# Patient Record
Sex: FEMALE | Race: BLACK OR AFRICAN AMERICAN | NOT HISPANIC OR LATINO | Employment: OTHER | URBAN - METROPOLITAN AREA
[De-identification: names, ages, dates, MRNs, and addresses within clinical notes are randomized per-mention and may not be internally consistent; named-entity substitution may affect disease eponyms.]

---

## 2021-07-28 ENCOUNTER — OFFICE VISIT (OUTPATIENT)
Dept: OPTOMETRY | Facility: CLINIC | Age: 81
End: 2021-07-28
Payer: MEDICARE

## 2021-07-28 DIAGNOSIS — E11.9 DIABETIC EYE EXAM: Primary | ICD-10-CM

## 2021-07-28 DIAGNOSIS — Z96.1 PSEUDOPHAKIA: ICD-10-CM

## 2021-07-28 DIAGNOSIS — H52.7 REFRACTIVE ERROR: ICD-10-CM

## 2021-07-28 DIAGNOSIS — Z01.00 DIABETIC EYE EXAM: Primary | ICD-10-CM

## 2021-07-28 PROCEDURE — 1101F PT FALLS ASSESS-DOCD LE1/YR: CPT | Mod: CPTII,S$GLB,, | Performed by: OPTOMETRIST

## 2021-07-28 PROCEDURE — 3288F PR FALLS RISK ASSESSMENT DOCUMENTED: ICD-10-PCS | Mod: CPTII,S$GLB,, | Performed by: OPTOMETRIST

## 2021-07-28 PROCEDURE — 1160F PR REVIEW ALL MEDS BY PRESCRIBER/CLIN PHARMACIST DOCUMENTED: ICD-10-PCS | Mod: CPTII,S$GLB,, | Performed by: OPTOMETRIST

## 2021-07-28 PROCEDURE — 3288F FALL RISK ASSESSMENT DOCD: CPT | Mod: CPTII,S$GLB,, | Performed by: OPTOMETRIST

## 2021-07-28 PROCEDURE — 1159F PR MEDICATION LIST DOCUMENTED IN MEDICAL RECORD: ICD-10-PCS | Mod: CPTII,S$GLB,, | Performed by: OPTOMETRIST

## 2021-07-28 PROCEDURE — 1126F PR PAIN SEVERITY QUANTIFIED, NO PAIN PRESENT: ICD-10-PCS | Mod: CPTII,S$GLB,, | Performed by: OPTOMETRIST

## 2021-07-28 PROCEDURE — 1160F RVW MEDS BY RX/DR IN RCRD: CPT | Mod: CPTII,S$GLB,, | Performed by: OPTOMETRIST

## 2021-07-28 PROCEDURE — 92004 COMPRE OPH EXAM NEW PT 1/>: CPT | Mod: S$GLB,,, | Performed by: OPTOMETRIST

## 2021-07-28 PROCEDURE — 1126F AMNT PAIN NOTED NONE PRSNT: CPT | Mod: CPTII,S$GLB,, | Performed by: OPTOMETRIST

## 2021-07-28 PROCEDURE — 92015 DETERMINE REFRACTIVE STATE: CPT | Mod: S$GLB,,, | Performed by: OPTOMETRIST

## 2021-07-28 PROCEDURE — 1159F MED LIST DOCD IN RCRD: CPT | Mod: CPTII,S$GLB,, | Performed by: OPTOMETRIST

## 2021-07-28 PROCEDURE — 99999 PR PBB SHADOW E&M-NEW PATIENT-LVL III: ICD-10-PCS | Mod: PBBFAC,,, | Performed by: OPTOMETRIST

## 2021-07-28 PROCEDURE — 99999 PR PBB SHADOW E&M-NEW PATIENT-LVL III: CPT | Mod: PBBFAC,,, | Performed by: OPTOMETRIST

## 2021-07-28 PROCEDURE — 1101F PR PT FALLS ASSESS DOC 0-1 FALLS W/OUT INJ PAST YR: ICD-10-PCS | Mod: CPTII,S$GLB,, | Performed by: OPTOMETRIST

## 2021-07-28 PROCEDURE — 92004 PR EYE EXAM, NEW PATIENT,COMPREHESV: ICD-10-PCS | Mod: S$GLB,,, | Performed by: OPTOMETRIST

## 2021-07-28 PROCEDURE — 92015 PR REFRACTION: ICD-10-PCS | Mod: S$GLB,,, | Performed by: OPTOMETRIST

## 2021-07-28 RX ORDER — LEVOTHYROXINE SODIUM 150 UG/1
TABLET ORAL
COMMUNITY
Start: 2020-10-08

## 2021-07-28 RX ORDER — LEVOCETIRIZINE DIHYDROCHLORIDE 5 MG/1
1 TABLET, FILM COATED ORAL NIGHTLY
Status: ON HOLD | COMMUNITY
Start: 2021-03-12 | End: 2022-04-15 | Stop reason: SINTOL

## 2021-07-28 RX ORDER — MONTELUKAST SODIUM 10 MG/1
1 TABLET ORAL NIGHTLY
COMMUNITY
Start: 2020-10-08

## 2021-07-28 RX ORDER — PROPRANOLOL HYDROCHLORIDE 20 MG/1
1 TABLET ORAL 2 TIMES DAILY
Status: ON HOLD | COMMUNITY
Start: 2021-05-31 | End: 2022-04-14 | Stop reason: CLARIF

## 2021-07-28 RX ORDER — CLONIDINE HYDROCHLORIDE 0.2 MG/1
1 TABLET ORAL 2 TIMES DAILY
Status: ON HOLD | COMMUNITY
Start: 2021-03-12 | End: 2022-04-14 | Stop reason: CLARIF

## 2021-07-28 RX ORDER — GLIMEPIRIDE 4 MG/1
1 TABLET ORAL EVERY MORNING
COMMUNITY
Start: 2021-05-31 | End: 2022-05-31

## 2021-07-28 RX ORDER — ISOSORBIDE MONONITRATE 20 MG/1
1 TABLET ORAL 2 TIMES DAILY
COMMUNITY
Start: 2021-02-03

## 2021-07-28 RX ORDER — SIMVASTATIN 10 MG/1
1 TABLET, FILM COATED ORAL NIGHTLY
Status: ON HOLD | COMMUNITY
Start: 2021-01-25 | End: 2022-04-14 | Stop reason: CLARIF

## 2021-07-28 RX ORDER — AMLODIPINE BESYLATE 10 MG/1
1 TABLET ORAL DAILY
Status: ON HOLD | COMMUNITY
Start: 2020-11-05 | End: 2022-04-14 | Stop reason: CLARIF

## 2021-07-28 RX ORDER — FUROSEMIDE 20 MG/1
1 TABLET ORAL DAILY
Status: ON HOLD | COMMUNITY
Start: 2021-01-19 | End: 2022-04-14 | Stop reason: CLARIF

## 2021-07-28 RX ORDER — GABAPENTIN 100 MG/1
1 CAPSULE ORAL
Status: ON HOLD | COMMUNITY
Start: 2020-10-08 | End: 2022-04-14 | Stop reason: CLARIF

## 2021-07-28 RX ORDER — LEVOFLOXACIN 750 MG/1
TABLET ORAL
Status: ON HOLD | COMMUNITY
Start: 2021-05-13 | End: 2022-04-14 | Stop reason: CLARIF

## 2021-07-28 RX ORDER — ALBUTEROL SULFATE 90 UG/1
AEROSOL, METERED RESPIRATORY (INHALATION)
COMMUNITY
Start: 2021-02-03

## 2021-07-28 RX ORDER — HYDRALAZINE HYDROCHLORIDE 100 MG/1
1 TABLET, FILM COATED ORAL 2 TIMES DAILY
COMMUNITY
Start: 2020-10-08 | End: 2022-04-14

## 2021-07-28 RX ORDER — CALCITRIOL 0.25 UG/1
1 CAPSULE ORAL DAILY
COMMUNITY
Start: 2021-01-12

## 2022-04-11 ENCOUNTER — TELEPHONE (OUTPATIENT)
Dept: OPHTHALMOLOGY | Facility: CLINIC | Age: 82
End: 2022-04-11
Payer: MEDICARE

## 2022-04-11 NOTE — TELEPHONE ENCOUNTER
----- Message from Denise Maldonado sent at 4/11/2022  9:32 AM CDT -----  Regarding: call back  Contact: pt daughter  Pt daughter requesting a call back in regards to scheduling an appt for pt having severe pain in eyes. Pt daughter stated she need a Tuesday or Thursday appt due to pt going to dialysis on Monday, Wednesday, and Friday.    Della @ [phone]

## 2022-04-12 PROCEDURE — G0180 PR HOME HEALTH MD CERTIFICATION: ICD-10-PCS | Mod: ,,, | Performed by: STUDENT IN AN ORGANIZED HEALTH CARE EDUCATION/TRAINING PROGRAM

## 2022-04-12 PROCEDURE — G0180 MD CERTIFICATION HHA PATIENT: HCPCS | Mod: ,,, | Performed by: STUDENT IN AN ORGANIZED HEALTH CARE EDUCATION/TRAINING PROGRAM

## 2022-04-14 ENCOUNTER — HOSPITAL ENCOUNTER (INPATIENT)
Facility: HOSPITAL | Age: 82
LOS: 8 days | Discharge: HOME HEALTH | DRG: 113 | End: 2022-04-22
Attending: EMERGENCY MEDICINE | Admitting: HOSPITALIST
Payer: MEDICARE

## 2022-04-14 ENCOUNTER — OFFICE VISIT (OUTPATIENT)
Dept: OPTOMETRY | Facility: CLINIC | Age: 82
End: 2022-04-14
Payer: MEDICARE

## 2022-04-14 ENCOUNTER — OFFICE VISIT (OUTPATIENT)
Dept: OPHTHALMOLOGY | Facility: CLINIC | Age: 82
End: 2022-04-14
Payer: MEDICARE

## 2022-04-14 DIAGNOSIS — H57.12 LEFT EYE PAIN: ICD-10-CM

## 2022-04-14 DIAGNOSIS — H44.009 ENDOPHTHALMITIS: Primary | ICD-10-CM

## 2022-04-14 DIAGNOSIS — H05.012 ORBITAL CELLULITIS, LEFT: ICD-10-CM

## 2022-04-14 DIAGNOSIS — E11.65 TYPE 2 DIABETES MELLITUS WITH HYPERGLYCEMIA, WITHOUT LONG-TERM CURRENT USE OF INSULIN: ICD-10-CM

## 2022-04-14 DIAGNOSIS — H20.052 HYPOPYON OF LEFT EYE: ICD-10-CM

## 2022-04-14 DIAGNOSIS — H44.002 ENDOPHTHALMITIS, LEFT EYE: Primary | ICD-10-CM

## 2022-04-14 DIAGNOSIS — Z96.1 PSEUDOPHAKIA OF BOTH EYES: ICD-10-CM

## 2022-04-14 DIAGNOSIS — H43.813 VITREOUS DEGENERATION OF BOTH EYES: ICD-10-CM

## 2022-04-14 DIAGNOSIS — N18.6 TYPE 2 DIABETES MELLITUS WITH CHRONIC KIDNEY DISEASE ON CHRONIC DIALYSIS, WITHOUT LONG-TERM CURRENT USE OF INSULIN: ICD-10-CM

## 2022-04-14 DIAGNOSIS — E11.22 TYPE 2 DIABETES MELLITUS WITH CHRONIC KIDNEY DISEASE ON CHRONIC DIALYSIS, WITHOUT LONG-TERM CURRENT USE OF INSULIN: ICD-10-CM

## 2022-04-14 DIAGNOSIS — N18.6 ESRD (END STAGE RENAL DISEASE): ICD-10-CM

## 2022-04-14 DIAGNOSIS — Z99.2 TYPE 2 DIABETES MELLITUS WITH CHRONIC KIDNEY DISEASE ON CHRONIC DIALYSIS, WITHOUT LONG-TERM CURRENT USE OF INSULIN: ICD-10-CM

## 2022-04-14 DIAGNOSIS — R78.81 BACTEREMIA: ICD-10-CM

## 2022-04-14 DIAGNOSIS — M86.271 SUBACUTE OSTEOMYELITIS OF RIGHT FOOT: ICD-10-CM

## 2022-04-14 PROBLEM — B96.1 BACTEREMIA DUE TO KLEBSIELLA PNEUMONIAE: Status: ACTIVE | Noted: 2022-04-14

## 2022-04-14 PROBLEM — E11.9 DM2 (DIABETES MELLITUS, TYPE 2): Status: ACTIVE | Noted: 2022-04-14

## 2022-04-14 PROBLEM — I10 HTN (HYPERTENSION): Status: ACTIVE | Noted: 2022-04-14

## 2022-04-14 PROBLEM — I25.10 CAD (CORONARY ARTERY DISEASE): Status: ACTIVE | Noted: 2022-04-14

## 2022-04-14 LAB
ANION GAP SERPL CALC-SCNC: 13 MMOL/L (ref 8–16)
BASOPHILS # BLD AUTO: 0.01 K/UL (ref 0–0.2)
BASOPHILS NFR BLD: 0.1 % (ref 0–1.9)
BUN SERPL-MCNC: 33 MG/DL (ref 8–23)
CALCIUM SERPL-MCNC: 9.1 MG/DL (ref 8.7–10.5)
CHLORIDE SERPL-SCNC: 96 MMOL/L (ref 95–110)
CO2 SERPL-SCNC: 28 MMOL/L (ref 23–29)
CREAT SERPL-MCNC: 4.5 MG/DL (ref 0.5–1.4)
DIFFERENTIAL METHOD: ABNORMAL
EOSINOPHIL # BLD AUTO: 0.1 K/UL (ref 0–0.5)
EOSINOPHIL NFR BLD: 0.4 % (ref 0–8)
ERYTHROCYTE [DISTWIDTH] IN BLOOD BY AUTOMATED COUNT: 15 % (ref 11.5–14.5)
EST. GFR  (AFRICAN AMERICAN): 9.8 ML/MIN/1.73 M^2
EST. GFR  (NON AFRICAN AMERICAN): 8.5 ML/MIN/1.73 M^2
GLUCOSE SERPL-MCNC: 71 MG/DL (ref 70–110)
HCT VFR BLD AUTO: 25.5 % (ref 37–48.5)
HGB BLD-MCNC: 7.7 G/DL (ref 12–16)
IMM GRANULOCYTES # BLD AUTO: 0.1 K/UL (ref 0–0.04)
IMM GRANULOCYTES NFR BLD AUTO: 0.7 % (ref 0–0.5)
LACTATE SERPL-SCNC: 0.9 MMOL/L (ref 0.5–2.2)
LYMPHOCYTES # BLD AUTO: 0.9 K/UL (ref 1–4.8)
LYMPHOCYTES NFR BLD: 6.8 % (ref 18–48)
MCH RBC QN AUTO: 28.7 PG (ref 27–31)
MCHC RBC AUTO-ENTMCNC: 30.2 G/DL (ref 32–36)
MCV RBC AUTO: 95 FL (ref 82–98)
MONOCYTES # BLD AUTO: 1 K/UL (ref 0.3–1)
MONOCYTES NFR BLD: 7.4 % (ref 4–15)
NEUTROPHILS # BLD AUTO: 11.7 K/UL (ref 1.8–7.7)
NEUTROPHILS NFR BLD: 84.6 % (ref 38–73)
NRBC BLD-RTO: 0 /100 WBC
PLATELET # BLD AUTO: 377 K/UL (ref 150–450)
PMV BLD AUTO: 9 FL (ref 9.2–12.9)
POCT GLUCOSE: 114 MG/DL (ref 70–110)
POCT GLUCOSE: 120 MG/DL (ref 70–110)
POCT GLUCOSE: 70 MG/DL (ref 70–110)
POTASSIUM SERPL-SCNC: 4.1 MMOL/L (ref 3.5–5.1)
RBC # BLD AUTO: 2.68 M/UL (ref 4–5.4)
SODIUM SERPL-SCNC: 137 MMOL/L (ref 136–145)
WBC # BLD AUTO: 13.85 K/UL (ref 3.9–12.7)

## 2022-04-14 PROCEDURE — 99999 PR PBB SHADOW E&M-EST. PATIENT-LVL III: CPT | Mod: PBBFAC,,, | Performed by: OPHTHALMOLOGY

## 2022-04-14 PROCEDURE — 87040 BLOOD CULTURE FOR BACTERIA: CPT | Mod: 59 | Performed by: EMERGENCY MEDICINE

## 2022-04-14 PROCEDURE — 1101F PT FALLS ASSESS-DOCD LE1/YR: CPT | Mod: CPTII,S$GLB,, | Performed by: OPHTHALMOLOGY

## 2022-04-14 PROCEDURE — 1125F PR PAIN SEVERITY QUANTIFIED, PAIN PRESENT: ICD-10-PCS | Mod: CPTII,S$GLB,, | Performed by: OPHTHALMOLOGY

## 2022-04-14 PROCEDURE — 99999 PR PBB SHADOW E&M-EST. PATIENT-LVL III: ICD-10-PCS | Mod: PBBFAC,,, | Performed by: OPTOMETRIST

## 2022-04-14 PROCEDURE — 3288F PR FALLS RISK ASSESSMENT DOCUMENTED: ICD-10-PCS | Mod: CPTII,S$GLB,, | Performed by: OPTOMETRIST

## 2022-04-14 PROCEDURE — 82962 GLUCOSE BLOOD TEST: CPT

## 2022-04-14 PROCEDURE — 1160F RVW MEDS BY RX/DR IN RCRD: CPT | Mod: CPTII,S$GLB,, | Performed by: OPHTHALMOLOGY

## 2022-04-14 PROCEDURE — 1101F PR PT FALLS ASSESS DOC 0-1 FALLS W/OUT INJ PAST YR: ICD-10-PCS | Mod: CPTII,S$GLB,, | Performed by: OPTOMETRIST

## 2022-04-14 PROCEDURE — 3288F PR FALLS RISK ASSESSMENT DOCUMENTED: ICD-10-PCS | Mod: CPTII,S$GLB,, | Performed by: OPHTHALMOLOGY

## 2022-04-14 PROCEDURE — 12000002 HC ACUTE/MED SURGE SEMI-PRIVATE ROOM

## 2022-04-14 PROCEDURE — 99204 OFFICE O/P NEW MOD 45 MIN: CPT | Mod: S$GLB,,, | Performed by: OPHTHALMOLOGY

## 2022-04-14 PROCEDURE — 1101F PR PT FALLS ASSESS DOC 0-1 FALLS W/OUT INJ PAST YR: ICD-10-PCS | Mod: CPTII,S$GLB,, | Performed by: OPHTHALMOLOGY

## 2022-04-14 PROCEDURE — 1125F AMNT PAIN NOTED PAIN PRSNT: CPT | Mod: CPTII,S$GLB,, | Performed by: OPHTHALMOLOGY

## 2022-04-14 PROCEDURE — 99285 EMERGENCY DEPT VISIT HI MDM: CPT | Mod: 25

## 2022-04-14 PROCEDURE — 99999 PR PBB SHADOW E&M-EST. PATIENT-LVL III: ICD-10-PCS | Mod: PBBFAC,,, | Performed by: OPHTHALMOLOGY

## 2022-04-14 PROCEDURE — 85025 COMPLETE CBC W/AUTO DIFF WBC: CPT | Performed by: STUDENT IN AN ORGANIZED HEALTH CARE EDUCATION/TRAINING PROGRAM

## 2022-04-14 PROCEDURE — 92014 COMPRE OPH EXAM EST PT 1/>: CPT | Mod: S$GLB,,, | Performed by: OPTOMETRIST

## 2022-04-14 PROCEDURE — 3288F FALL RISK ASSESSMENT DOCD: CPT | Mod: CPTII,S$GLB,, | Performed by: OPTOMETRIST

## 2022-04-14 PROCEDURE — 76512 OPH US DX B-SCAN: CPT | Mod: 50,S$GLB,, | Performed by: OPHTHALMOLOGY

## 2022-04-14 PROCEDURE — 99204 PR OFFICE/OUTPT VISIT, NEW, LEVL IV, 45-59 MIN: ICD-10-PCS | Mod: S$GLB,,, | Performed by: OPHTHALMOLOGY

## 2022-04-14 PROCEDURE — 80048 BASIC METABOLIC PNL TOTAL CA: CPT | Performed by: STUDENT IN AN ORGANIZED HEALTH CARE EDUCATION/TRAINING PROGRAM

## 2022-04-14 PROCEDURE — 1159F MED LIST DOCD IN RCRD: CPT | Mod: CPTII,S$GLB,, | Performed by: OPHTHALMOLOGY

## 2022-04-14 PROCEDURE — 92014 PR EYE EXAM, EST PATIENT,COMPREHESV: ICD-10-PCS | Mod: S$GLB,,, | Performed by: OPTOMETRIST

## 2022-04-14 PROCEDURE — 83036 HEMOGLOBIN GLYCOSYLATED A1C: CPT | Performed by: STUDENT IN AN ORGANIZED HEALTH CARE EDUCATION/TRAINING PROGRAM

## 2022-04-14 PROCEDURE — 1157F ADVNC CARE PLAN IN RCRD: CPT | Mod: CPTII,S$GLB,, | Performed by: OPTOMETRIST

## 2022-04-14 PROCEDURE — 1160F PR REVIEW ALL MEDS BY PRESCRIBER/CLIN PHARMACIST DOCUMENTED: ICD-10-PCS | Mod: CPTII,S$GLB,, | Performed by: OPHTHALMOLOGY

## 2022-04-14 PROCEDURE — 63600175 PHARM REV CODE 636 W HCPCS: Performed by: STUDENT IN AN ORGANIZED HEALTH CARE EDUCATION/TRAINING PROGRAM

## 2022-04-14 PROCEDURE — 99284 EMERGENCY DEPT VISIT MOD MDM: CPT | Mod: ,,, | Performed by: EMERGENCY MEDICINE

## 2022-04-14 PROCEDURE — 1157F PR ADVANCE CARE PLAN OR EQUIV PRESENT IN MEDICAL RECORD: ICD-10-PCS | Mod: CPTII,S$GLB,, | Performed by: OPTOMETRIST

## 2022-04-14 PROCEDURE — 83605 ASSAY OF LACTIC ACID: CPT | Performed by: STUDENT IN AN ORGANIZED HEALTH CARE EDUCATION/TRAINING PROGRAM

## 2022-04-14 PROCEDURE — 76512 B SCAN: ICD-10-PCS | Mod: 50,S$GLB,, | Performed by: OPHTHALMOLOGY

## 2022-04-14 PROCEDURE — 1159F PR MEDICATION LIST DOCUMENTED IN MEDICAL RECORD: ICD-10-PCS | Mod: CPTII,S$GLB,, | Performed by: OPHTHALMOLOGY

## 2022-04-14 PROCEDURE — 3288F FALL RISK ASSESSMENT DOCD: CPT | Mod: CPTII,S$GLB,, | Performed by: OPHTHALMOLOGY

## 2022-04-14 PROCEDURE — 99284 PR EMERGENCY DEPT VISIT,LEVEL IV: ICD-10-PCS | Mod: ,,, | Performed by: EMERGENCY MEDICINE

## 2022-04-14 PROCEDURE — 1101F PT FALLS ASSESS-DOCD LE1/YR: CPT | Mod: CPTII,S$GLB,, | Performed by: OPTOMETRIST

## 2022-04-14 PROCEDURE — 99999 PR PBB SHADOW E&M-EST. PATIENT-LVL III: CPT | Mod: PBBFAC,,, | Performed by: OPTOMETRIST

## 2022-04-14 PROCEDURE — 25500020 PHARM REV CODE 255: Performed by: EMERGENCY MEDICINE

## 2022-04-14 PROCEDURE — 25000003 PHARM REV CODE 250: Performed by: HOSPITALIST

## 2022-04-14 PROCEDURE — 1157F ADVNC CARE PLAN IN RCRD: CPT | Mod: CPTII,S$GLB,, | Performed by: OPHTHALMOLOGY

## 2022-04-14 PROCEDURE — 1157F PR ADVANCE CARE PLAN OR EQUIV PRESENT IN MEDICAL RECORD: ICD-10-PCS | Mod: CPTII,S$GLB,, | Performed by: OPHTHALMOLOGY

## 2022-04-14 PROCEDURE — 63600175 PHARM REV CODE 636 W HCPCS: Performed by: HOSPITALIST

## 2022-04-14 RX ORDER — CARVEDILOL 3.12 MG/1
3.12 TABLET ORAL 2 TIMES DAILY
Status: DISCONTINUED | OUTPATIENT
Start: 2022-04-14 | End: 2022-04-15

## 2022-04-14 RX ORDER — AMLODIPINE BESYLATE 10 MG/1
10 TABLET ORAL DAILY
Status: DISCONTINUED | OUTPATIENT
Start: 2022-04-15 | End: 2022-04-14

## 2022-04-14 RX ORDER — HYDRALAZINE HYDROCHLORIDE 25 MG/1
100 TABLET, FILM COATED ORAL 2 TIMES DAILY
Status: DISCONTINUED | OUTPATIENT
Start: 2022-04-14 | End: 2022-04-14

## 2022-04-14 RX ORDER — HEPARIN SODIUM 5000 [USP'U]/ML
5000 INJECTION, SOLUTION INTRAVENOUS; SUBCUTANEOUS EVERY 8 HOURS
Status: DISCONTINUED | OUTPATIENT
Start: 2022-04-14 | End: 2022-04-16

## 2022-04-14 RX ORDER — IBUPROFEN 200 MG
24 TABLET ORAL
Status: DISCONTINUED | OUTPATIENT
Start: 2022-04-14 | End: 2022-04-14

## 2022-04-14 RX ORDER — CALCITRIOL 0.25 UG/1
0.25 CAPSULE ORAL DAILY
Status: DISCONTINUED | OUTPATIENT
Start: 2022-04-15 | End: 2022-04-14

## 2022-04-14 RX ORDER — CIPROFLOXACIN HYDROCHLORIDE 250 MG/1
250 TABLET, FILM COATED ORAL DAILY
Status: ON HOLD | COMMUNITY
Start: 2022-04-08 | End: 2022-04-22 | Stop reason: HOSPADM

## 2022-04-14 RX ORDER — GABAPENTIN 100 MG/1
100 CAPSULE ORAL DAILY
Status: DISCONTINUED | OUTPATIENT
Start: 2022-04-15 | End: 2022-04-14

## 2022-04-14 RX ORDER — IBUPROFEN 200 MG
16 TABLET ORAL
Status: DISCONTINUED | OUTPATIENT
Start: 2022-04-14 | End: 2022-04-15

## 2022-04-14 RX ORDER — TALC
6 POWDER (GRAM) TOPICAL NIGHTLY PRN
Status: DISCONTINUED | OUTPATIENT
Start: 2022-04-14 | End: 2022-04-22 | Stop reason: HOSPADM

## 2022-04-14 RX ORDER — INSULIN ASPART 100 [IU]/ML
0-5 INJECTION, SOLUTION INTRAVENOUS; SUBCUTANEOUS
Status: DISCONTINUED | OUTPATIENT
Start: 2022-04-14 | End: 2022-04-22 | Stop reason: HOSPADM

## 2022-04-14 RX ORDER — IPRATROPIUM BROMIDE AND ALBUTEROL SULFATE 2.5; .5 MG/3ML; MG/3ML
3 SOLUTION RESPIRATORY (INHALATION) EVERY 6 HOURS PRN
Status: DISCONTINUED | OUTPATIENT
Start: 2022-04-14 | End: 2022-04-22 | Stop reason: HOSPADM

## 2022-04-14 RX ORDER — FUROSEMIDE 20 MG/1
20 TABLET ORAL DAILY
Status: DISCONTINUED | OUTPATIENT
Start: 2022-04-15 | End: 2022-04-14

## 2022-04-14 RX ORDER — ONDANSETRON 2 MG/ML
4 INJECTION INTRAMUSCULAR; INTRAVENOUS EVERY 8 HOURS PRN
Status: DISCONTINUED | OUTPATIENT
Start: 2022-04-14 | End: 2022-04-22 | Stop reason: HOSPADM

## 2022-04-14 RX ORDER — HYDRALAZINE HYDROCHLORIDE 25 MG/1
25 TABLET, FILM COATED ORAL 3 TIMES DAILY
Status: DISCONTINUED | OUTPATIENT
Start: 2022-04-14 | End: 2022-04-15

## 2022-04-14 RX ORDER — IBUPROFEN 200 MG
16 TABLET ORAL
Status: DISCONTINUED | OUTPATIENT
Start: 2022-04-14 | End: 2022-04-14

## 2022-04-14 RX ORDER — PROPRANOLOL HYDROCHLORIDE 10 MG/1
20 TABLET ORAL 2 TIMES DAILY
Status: DISCONTINUED | OUTPATIENT
Start: 2022-04-14 | End: 2022-04-14

## 2022-04-14 RX ORDER — GLUCAGON 1 MG
1 KIT INJECTION
Status: DISCONTINUED | OUTPATIENT
Start: 2022-04-14 | End: 2022-04-15

## 2022-04-14 RX ORDER — IBUPROFEN 200 MG
24 TABLET ORAL
Status: DISCONTINUED | OUTPATIENT
Start: 2022-04-14 | End: 2022-04-15

## 2022-04-14 RX ORDER — ACETAMINOPHEN 325 MG/1
650 TABLET ORAL EVERY 4 HOURS PRN
Status: DISCONTINUED | OUTPATIENT
Start: 2022-04-14 | End: 2022-04-21

## 2022-04-14 RX ORDER — NALOXONE HCL 0.4 MG/ML
0.02 VIAL (ML) INJECTION
Status: DISCONTINUED | OUTPATIENT
Start: 2022-04-14 | End: 2022-04-22 | Stop reason: HOSPADM

## 2022-04-14 RX ORDER — LEVOTHYROXINE SODIUM 150 UG/1
150 TABLET ORAL DAILY
Status: DISCONTINUED | OUTPATIENT
Start: 2022-04-15 | End: 2022-04-17

## 2022-04-14 RX ORDER — ISOSORBIDE MONONITRATE 20 MG/1
20 TABLET ORAL 2 TIMES DAILY
Status: DISCONTINUED | OUTPATIENT
Start: 2022-04-14 | End: 2022-04-22 | Stop reason: HOSPADM

## 2022-04-14 RX ORDER — CLONIDINE HYDROCHLORIDE 0.1 MG/1
0.2 TABLET ORAL 2 TIMES DAILY
Status: DISCONTINUED | OUTPATIENT
Start: 2022-04-14 | End: 2022-04-14

## 2022-04-14 RX ORDER — PROCHLORPERAZINE EDISYLATE 5 MG/ML
5 INJECTION INTRAMUSCULAR; INTRAVENOUS EVERY 6 HOURS PRN
Status: DISCONTINUED | OUTPATIENT
Start: 2022-04-14 | End: 2022-04-22 | Stop reason: HOSPADM

## 2022-04-14 RX ORDER — SODIUM CHLORIDE 0.9 % (FLUSH) 0.9 %
10 SYRINGE (ML) INJECTION
Status: DISCONTINUED | OUTPATIENT
Start: 2022-04-14 | End: 2022-04-22 | Stop reason: HOSPADM

## 2022-04-14 RX ORDER — ATORVASTATIN CALCIUM 10 MG/1
10 TABLET, FILM COATED ORAL DAILY
Status: DISCONTINUED | OUTPATIENT
Start: 2022-04-15 | End: 2022-04-14

## 2022-04-14 RX ADMIN — IOHEXOL 75 ML: 350 INJECTION, SOLUTION INTRAVENOUS at 06:04

## 2022-04-14 RX ADMIN — CEFTRIAXONE 2 G: 2 INJECTION, SOLUTION INTRAVENOUS at 06:04

## 2022-04-14 RX ADMIN — HYDRALAZINE HYDROCHLORIDE 25 MG: 25 TABLET, FILM COATED ORAL at 09:04

## 2022-04-14 RX ADMIN — VANCOMYCIN HYDROCHLORIDE 2000 MG: 500 INJECTION, POWDER, LYOPHILIZED, FOR SOLUTION INTRAVENOUS at 06:04

## 2022-04-14 RX ADMIN — HEPARIN SODIUM 5000 UNITS: 5000 INJECTION INTRAVENOUS; SUBCUTANEOUS at 09:04

## 2022-04-14 RX ADMIN — CARVEDILOL 3.12 MG: 3.12 TABLET, FILM COATED ORAL at 10:04

## 2022-04-14 NOTE — ED NOTES
Erika Perera, an 82 y.o. female presents to the ED complaining of left eye swelling and pain. Went to  and was admitted on 4/4. Was seen today on the 10th floor and was sent to the ER for further treatment. Endorses pain to the left eye.       Chief Complaint   Patient presents with    Eye Problem     Left eye drooping eyelid x 2 weeks     Review of patient's allergies indicates:   Allergen Reactions    Amlodipine Other (See Comments)    Atorvastatin Other (See Comments)    Nebivolol Other (See Comments)     No past medical history on file.

## 2022-04-14 NOTE — ED NOTES
"Patient being fed by son. Family explained to hold off until OK with team and ED MDs. Pt son states, "my daughter is a nurse and I cannot sit here and not feed my own mother"  "

## 2022-04-14 NOTE — ED PROVIDER NOTES
Encounter Date: 4/14/2022       History     Chief Complaint   Patient presents with    Eye Problem     Left eye drooping eyelid x 2 weeks     Erika Perera is a 82 year old woman presenting from ophthalmology clinic for evaluation of 2 weeks of progressive L perioccular swelling, L perioccular pain, and worsening vision of the L eye. She has a history of T2DM, ESRD(MWF), HTN, HLD. She has associated chills and night sweats that began during this time as well. She denies fevers, nausea, vomiting, constipation, diarrhea, joint pain, and dysuria. Her last dialysis was yesterday.     She was evaluated in ophthalmology clinic today for assessment of this current issue with Dr. Vazquez with recommendations to come to the ED for CT Head and cultures to assess for infection. He notes that patient will likely need either evisceration or enucleation soon.        Review of patient's allergies indicates:   Allergen Reactions    Amlodipine Other (See Comments)    Atorvastatin Other (See Comments)    Nebivolol Other (See Comments)     No past medical history on file.  Past Surgical History:   Procedure Laterality Date    CATARACT EXTRACTION Bilateral      Family History   Problem Relation Age of Onset    No Known Problems Mother     No Known Problems Father     No Known Problems Sister     No Known Problems Brother     No Known Problems Maternal Aunt     No Known Problems Maternal Uncle     No Known Problems Paternal Aunt     No Known Problems Paternal Uncle     No Known Problems Maternal Grandmother     No Known Problems Maternal Grandfather     No Known Problems Paternal Grandmother     No Known Problems Paternal Grandfather     Amblyopia Neg Hx     Blindness Neg Hx     Cancer Neg Hx     Cataracts Neg Hx     Diabetes Neg Hx     Glaucoma Neg Hx     Hypertension Neg Hx     Macular degeneration Neg Hx     Retinal detachment Neg Hx     Strabismus Neg Hx     Stroke Neg Hx     Thyroid disease Neg Hx       Social History     Tobacco Use    Smoking status: Never Smoker    Smokeless tobacco: Never Used     Review of Systems   Constitutional: Positive for chills. Negative for activity change, appetite change and fever.   HENT: Negative for congestion, postnasal drip and rhinorrhea.    Eyes: Positive for photophobia, pain, discharge, redness and visual disturbance.   Respiratory: Negative for cough, shortness of breath and wheezing.    Cardiovascular: Negative for chest pain and palpitations.   Gastrointestinal: Negative for abdominal distention, abdominal pain, constipation, diarrhea, nausea and vomiting.   Genitourinary: Negative for flank pain and hematuria.   Musculoskeletal: Negative for back pain and myalgias.   Neurological: Positive for headaches. Negative for dizziness.   Psychiatric/Behavioral: Negative for agitation, behavioral problems and confusion.       Physical Exam     Initial Vitals [04/14/22 1454]   BP Pulse Resp Temp SpO2   (!) 153/64 63 16 98.8 °F (37.1 °C) 98 %      MAP       --         Physical Exam    Vitals reviewed.  Constitutional: She appears well-developed and well-nourished.   HENT:   Head: Normocephalic and atraumatic.   Eyes: Pupils are equal, round, and reactive to light. Left eye exhibits chemosis. Right conjunctiva is not injected. Left conjunctiva is injected. Right eye exhibits normal extraocular motion. Left eye exhibits abnormal extraocular motion.   L orbital swelling and tenderness.   L hypopyon   Neck:   Normal range of motion.  Cardiovascular: Normal rate, regular rhythm and normal heart sounds.   Pulmonary/Chest: Breath sounds normal. No respiratory distress. She has no wheezes.   Abdominal: Abdomen is soft. Bowel sounds are normal. She exhibits no distension. There is no abdominal tenderness.   Musculoskeletal:         General: No edema.      Cervical back: Normal range of motion.     Neurological: She is alert and oriented to person, place, and time.   Skin: Skin is warm  and dry.   Psychiatric: She has a normal mood and affect. Thought content normal.         ED Course   Procedures  Labs Reviewed   BASIC METABOLIC PANEL - Abnormal; Notable for the following components:       Result Value    BUN 33 (*)     Creatinine 4.5 (*)     eGFR if  9.8 (*)     eGFR if non  8.5 (*)     All other components within normal limits   CBC W/ AUTO DIFFERENTIAL - Abnormal; Notable for the following components:    WBC 13.85 (*)     RBC 2.68 (*)     Hemoglobin 7.7 (*)     Hematocrit 25.5 (*)     MCHC 30.2 (*)     RDW 15.0 (*)     MPV 9.0 (*)     Immature Granulocytes 0.7 (*)     Gran # (ANC) 11.7 (*)     Immature Grans (Abs) 0.10 (*)     Lymph # 0.9 (*)     Gran % 84.6 (*)     Lymph % 6.8 (*)     All other components within normal limits   CULTURE, BLOOD   CULTURE, BLOOD   LACTIC ACID, PLASMA   POCT GLUCOSE   POCT GLUCOSE MONITORING CONTINUOUS          Imaging Results          CT Orbits Sella Post Fossa With Contrast (Final result)  Result time 04/14/22 19:10:06    Final result by Hong Hutton MD (04/14/22 19:10:06)                 Impression:      Bilateral exophthalmos.  No orbital lesion causing mass effect.  Minimal asymmetric enhancement at the periphery of the left globe compared to the right could represent a mild inflammatory or infectious process.  Follow-up recommended.    Electronically signed by resident: Rodrigue Eddy  Date:    04/14/2022  Time:    18:49    Electronically signed by: Hong Hutton  Date:    04/14/2022  Time:    19:10             Narrative:    EXAMINATION:  CT ORBITS SELLA POST FOSSA WITH CONTRAST    CLINICAL HISTORY:  Ocular pain;Exophthalmos;hypopyon, concern for orbital cellulitis.;.    COMPARISON:  None.    TECHNIQUE:  Contiguous axial images of the orbits were obtained with the administration of intravenous contrast. Coronal and sagittal reconstructions were performed.  Dose given 75 cc Omnipaque 350 intravenous  contrast.    FINDINGS:  There is no fracture identified of the orbital walls or floor.    The extraocular muscles and optic nerves appear symmetrical. Pre-septal soft tissues are within normal limits. Optic nerves and complex show no abnormalities. Lacrimal apparatus is unremarkable.    The maxillofacial skeleton is intact. No bony abnormality is identified of the zygoma, maxilla, or mandible. The pterygoid plates are intact bilaterally.    The globes are bilaterally symmetric in size demonstrating exophthalmos.  No orbital lesion.  There is minimal asymmetric enhancement at the periphery of the left globe compared to the right could represent a mild inflammatory or infectious process.  Recommend ophthalmology follow-up.  No focal abnormality.    Extraocular muscles appear within normal limits.    The paranasal sinuses are well pneumatized. No fluid is identified within the paranasal cavities. The nasal septum approximates the midline.    There is no nasal fracture. The nasopharynx and oropharynx are normal in appearance.    No soft tissue swelling or defect is identified.    Other findings:    Brain:  Normal.    Cavernous sinuses: Unremarkable                               CT Head Without Contrast (Final result)  Result time 04/14/22 19:11:54    Final result by Hong Hutton MD (04/14/22 19:11:54)                 Impression:      No evidence of acute intracranial pathology.    Involutional changes with chronic microvascular ischemic changes.    Electronically signed by resident: Rodrigue Eddy  Date:    04/14/2022  Time:    18:41    Electronically signed by: Hong Hutton  Date:    04/14/2022  Time:    19:11             Narrative:    EXAMINATION:  CT HEAD WITHOUT CONTRAST    CLINICAL HISTORY:  Headache, new or worsening (Age >= 50y);L eye endophyalmitis, L eye swelling and exophthalmos.;    TECHNIQUE:  Low dose axial CT images obtained throughout the head without the use of intravenous contrast.  Axial, sagittal  and coronal reconstructions were performed.    COMPARISON:  None    FINDINGS:  Intracranial compartment:    No new extra-axial blood or fluid collections are identified.    Moderate involutional changes and chronic microvascular ischemic changes in the periventricular white matter..  Bilateral calcifications of the basal ganglia.  No new parenchymal mass, hemorrhage, edema, or major vascular distribution infarct.    Ventricles are normal in size without evidence of hydrocephalus.    Skull/extracranial compartment:    No displaced calvarial fracture.    Mastoid air cells and paranasal sinuses are essentially clear.    Bilateral exophthalmos.  See CT orbits report same date.                                 Medications   calcitRIOL capsule 0.25 mcg (has no administration in time range)   furosemide tablet 20 mg (has no administration in time range)   gabapentin capsule 100 mg (has no administration in time range)   isosorbide mononitrate tablet 20 mg (has no administration in time range)   sodium chloride 0.9% flush 10 mL (has no administration in time range)   albuterol-ipratropium 2.5 mg-0.5 mg/3 mL nebulizer solution 3 mL (has no administration in time range)   melatonin tablet 6 mg (has no administration in time range)   ondansetron injection 4 mg (has no administration in time range)   prochlorperazine injection Soln 5 mg (has no administration in time range)   acetaminophen tablet 650 mg (has no administration in time range)   naloxone 0.4 mg/mL injection 0.02 mg (has no administration in time range)   glucose chewable tablet 16 g (has no administration in time range)   glucose chewable tablet 24 g (has no administration in time range)   heparin (porcine) injection 5,000 Units (has no administration in time range)   levothyroxine tablet 150 mcg (has no administration in time range)   hydrALAZINE tablet 25 mg (has no administration in time range)   vancomycin 2 g in dextrose 5 % 500 mL IVPB (2,000 mg Intravenous  New Bag 4/14/22 1815)   cefTRIAXone (ROCEPHIN) 2 g/50 mL D5W IVPB (0 g Intravenous Stopped 4/14/22 1921)   iohexoL (OMNIPAQUE 350) injection 75 mL (75 mLs Intravenous Given 4/14/22 1840)     Medical Decision Making:   History:   I obtained history from: someone other than patient.  Old Medical Records: I decided to obtain old medical records.  Initial Assessment:   Erika Perera is a 82 year old woman presenting from ophthalmology clinic for evaluation of 2 weeks of progressive L periocular swelling, L perioccular pain, and worsening vision of the L eye.    Differential Diagnosis:   Orbital cellulitis  Intraocular infection  Acute closed glaucoma  Retinal detachment   Clinical Tests:   Lab Tests: Ordered and Reviewed  Radiological Study: Ordered and Reviewed  ED Management:  Ophthalmology was contacted about patient and wants to proceed with surgery tomorrow. WBC elevated at 13, lactate negative. BCx obtained. CT Head and Orbit ordered. Given concerns for orbital cellulitis, she was given vanc and ceftriaxone for BSAbx. Requested admission to Hospital Medicine. She was admitted to  with Dr. Garcia.                      Clinical Impression:   Final diagnoses:  [H57.12] Left eye pain  [H20.052] Hypopyon of left eye  [N18.6] ESRD (end stage renal disease)  [E11.22, N18.6, Z99.2] Type 2 diabetes mellitus with chronic kidney disease on chronic dialysis, without long-term current use of insulin  [H05.012] Orbital cellulitis, left (Primary)          ED Disposition Condition    Admit               Jaquan Thomas MD  Resident  04/14/22 2020

## 2022-04-14 NOTE — PROGRESS NOTES
Subjective:       Patient ID: Erika Perera is a 82 y.o. female      Chief Complaint   Patient presents with    Eye Problem     History of Present Illness  Dls: 7/28/21 Dr. Pham     81 y/o female presents today with c/o x 1-2 weeks eyepain 9-10 os blurry   vision os film over os floaters os pain in head left side dropping TITO can not open TITO. Pt was admitted to ED on 4/3 and 4/5, treated for UTI with sepsis and bacteremia.     Eye meds  None         Assessment/Plan:     1. Endophthalmitis, left eye  Pt reports decrease of vision and pain x 1-2 weeks. Was in ED and treated for UTI with sepsis and bacteremia. OS with hypopyon and NLP on exam today. Likely endophthalmitis, Pt here today with son. Discussed with pt and son, same day consult to retina for evaluation.    Follow up for same day consult retina.

## 2022-04-14 NOTE — PROGRESS NOTES
HPI     URGENT: Endopthalmitis OS Consult    Referral by Dr. Ana JERNIGAN, OD    DLS 04/14/2022      CC: 81 y/o female presents today with c/o x 1 week eye pain 9-10 os blurry   vision os film over os floaters os pain in head left side dropping TITO can   not open TITO.     Eye meds  None     POHx:   1. Endophthalmitis OS    Last edited by Nicole Harry MA on 4/14/2022 11:39 AM. (History)         A/P    ICD-10-CM ICD-9-CM   1. Endophthalmitis, left eye  H44.002 360.00   2. Pseudophakia of both eyes  Z96.1 V43.1   3. Vitreous degeneration of both eyes  H43.813 379.21       1. Endophthalmitis, left eye  New onset pain/vision loss about 2 weeks ago  Son at visit today (POA)  Patient was recently admitted to [facility] for UTI and has been on cipro since     Patient states she lost vision 2 weeks ago, today is NLP and patient states she has been NLP for these two weeks. Exam notable for 4mm hypopyon, poor limited red reflex.     B scan 4/14/22 with fibrin/vit cell    Given findings, endophthalmitis OS but unclear if patient still with UTI and seeding systemically while on abx. We will send to ED for w/u and likely have the patient admitted. Should get CT Head, cultures to assess for infection. Patient will likely need either evisceration or enucleation soon, we had Dr. Reyna take a look at the patient and discuss briefly these scenarios with the patient's son in depth.     2. Pseudophakia of both eyes  Good lens position OU  Plan: Observation     3. Vitreous degeneration of both eyes  No RT/RD OD, has endoph as per #1 OS  Plan: as above     RTC pending inpatient w/u and to f/u with Dr. Reyna per surgery recs    I saw and examined the patient and reviewed in detail the findings documented. The final examination findings, image interpretations, and plan as documented in the record represent my personal judgment and conclusions.    Olegario Vazquez MD  Vitreoretinal Surgery   [facility]

## 2022-04-15 PROBLEM — N18.6 ANEMIA DUE TO CHRONIC KIDNEY DISEASE, ON CHRONIC DIALYSIS: Status: ACTIVE | Noted: 2022-04-15

## 2022-04-15 PROBLEM — D63.1 ANEMIA DUE TO CHRONIC KIDNEY DISEASE, ON CHRONIC DIALYSIS: Status: ACTIVE | Noted: 2022-04-15

## 2022-04-15 PROBLEM — Z99.2 ANEMIA DUE TO CHRONIC KIDNEY DISEASE, ON CHRONIC DIALYSIS: Status: ACTIVE | Noted: 2022-04-15

## 2022-04-15 PROBLEM — E16.2 HYPOGLYCEMIA: Status: ACTIVE | Noted: 2022-04-15

## 2022-04-15 LAB
ABO + RH BLD: NORMAL
ANION GAP SERPL CALC-SCNC: 11 MMOL/L (ref 8–16)
BLD GP AB SCN CELLS X3 SERPL QL: NORMAL
BLD PROD TYP BPU: NORMAL
BLOOD UNIT EXPIRATION DATE: NORMAL
BLOOD UNIT TYPE CODE: 5100
BLOOD UNIT TYPE: NORMAL
BUN SERPL-MCNC: 38 MG/DL (ref 8–23)
CALCIUM SERPL-MCNC: 8.7 MG/DL (ref 8.7–10.5)
CHLORIDE SERPL-SCNC: 95 MMOL/L (ref 95–110)
CO2 SERPL-SCNC: 26 MMOL/L (ref 23–29)
CODING SYSTEM: NORMAL
CREAT SERPL-MCNC: 4.7 MG/DL (ref 0.5–1.4)
DISPENSE STATUS: NORMAL
ERYTHROCYTE [DISTWIDTH] IN BLOOD BY AUTOMATED COUNT: 15 % (ref 11.5–14.5)
EST. GFR  (AFRICAN AMERICAN): 9.3 ML/MIN/1.73 M^2
EST. GFR  (NON AFRICAN AMERICAN): 8.1 ML/MIN/1.73 M^2
ESTIMATED AVG GLUCOSE: 97 MG/DL (ref 68–131)
GLUCOSE SERPL-MCNC: 71 MG/DL (ref 70–110)
HBA1C MFR BLD: 5 % (ref 4–5.6)
HCT VFR BLD AUTO: 21.1 % (ref 37–48.5)
HGB BLD-MCNC: 6.4 G/DL (ref 12–16)
MAGNESIUM SERPL-MCNC: 2 MG/DL (ref 1.6–2.6)
MCH RBC QN AUTO: 29.5 PG (ref 27–31)
MCHC RBC AUTO-ENTMCNC: 30.3 G/DL (ref 32–36)
MCV RBC AUTO: 97 FL (ref 82–98)
OB PNL STL: POSITIVE
PHOSPHATE SERPL-MCNC: 4.1 MG/DL (ref 2.7–4.5)
PLATELET # BLD AUTO: 307 K/UL (ref 150–450)
PMV BLD AUTO: 9.3 FL (ref 9.2–12.9)
POCT GLUCOSE: 100 MG/DL (ref 70–110)
POCT GLUCOSE: 112 MG/DL (ref 70–110)
POCT GLUCOSE: 194 MG/DL (ref 70–110)
POCT GLUCOSE: 29 MG/DL (ref 70–110)
POCT GLUCOSE: 38 MG/DL (ref 70–110)
POCT GLUCOSE: 40 MG/DL (ref 70–110)
POCT GLUCOSE: 46 MG/DL (ref 70–110)
POCT GLUCOSE: 56 MG/DL (ref 70–110)
POCT GLUCOSE: 72 MG/DL (ref 70–110)
POCT GLUCOSE: 76 MG/DL (ref 70–110)
POCT GLUCOSE: 82 MG/DL (ref 70–110)
POTASSIUM SERPL-SCNC: 4.2 MMOL/L (ref 3.5–5.1)
RBC # BLD AUTO: 2.17 M/UL (ref 4–5.4)
SODIUM SERPL-SCNC: 132 MMOL/L (ref 136–145)
TRANS ERYTHROCYTES VOL PATIENT: NORMAL ML
WBC # BLD AUTO: 11.88 K/UL (ref 3.9–12.7)

## 2022-04-15 PROCEDURE — 82947 ASSAY GLUCOSE BLOOD QUANT: CPT | Performed by: HOSPITALIST

## 2022-04-15 PROCEDURE — 86920 COMPATIBILITY TEST SPIN: CPT | Performed by: HOSPITALIST

## 2022-04-15 PROCEDURE — 25000003 PHARM REV CODE 250: Performed by: HOSPITALIST

## 2022-04-15 PROCEDURE — 99223 PR INITIAL HOSPITAL CARE,LEVL III: ICD-10-PCS | Mod: ,,, | Performed by: HOSPITALIST

## 2022-04-15 PROCEDURE — 99223 1ST HOSP IP/OBS HIGH 75: CPT | Mod: ,,, | Performed by: HOSPITALIST

## 2022-04-15 PROCEDURE — 84681 ASSAY OF C-PEPTIDE: CPT | Performed by: HOSPITALIST

## 2022-04-15 PROCEDURE — 84100 ASSAY OF PHOSPHORUS: CPT | Performed by: HOSPITALIST

## 2022-04-15 PROCEDURE — 36415 COLL VENOUS BLD VENIPUNCTURE: CPT | Performed by: HOSPITALIST

## 2022-04-15 PROCEDURE — 85027 COMPLETE CBC AUTOMATED: CPT | Performed by: HOSPITALIST

## 2022-04-15 PROCEDURE — 93010 EKG 12-LEAD: ICD-10-PCS | Mod: ,,, | Performed by: INTERNAL MEDICINE

## 2022-04-15 PROCEDURE — 80048 BASIC METABOLIC PNL TOTAL CA: CPT | Performed by: HOSPITALIST

## 2022-04-15 PROCEDURE — 99499 UNLISTED E&M SERVICE: CPT | Mod: ,,, | Performed by: HOSPITALIST

## 2022-04-15 PROCEDURE — 99223 1ST HOSP IP/OBS HIGH 75: CPT | Mod: GC,,, | Performed by: INTERNAL MEDICINE

## 2022-04-15 PROCEDURE — P9021 RED BLOOD CELLS UNIT: HCPCS | Performed by: HOSPITALIST

## 2022-04-15 PROCEDURE — 83525 ASSAY OF INSULIN: CPT | Performed by: HOSPITALIST

## 2022-04-15 PROCEDURE — 82272 OCCULT BLD FECES 1-3 TESTS: CPT | Performed by: HOSPITALIST

## 2022-04-15 PROCEDURE — 82010 KETONE BODYS QUAN: CPT | Performed by: HOSPITALIST

## 2022-04-15 PROCEDURE — 99499 NO LOS: ICD-10-PCS | Mod: ,,, | Performed by: HOSPITALIST

## 2022-04-15 PROCEDURE — 80307 DRUG TEST PRSMV CHEM ANLYZR: CPT | Performed by: HOSPITALIST

## 2022-04-15 PROCEDURE — 93005 ELECTROCARDIOGRAM TRACING: CPT

## 2022-04-15 PROCEDURE — 86850 RBC ANTIBODY SCREEN: CPT | Performed by: HOSPITALIST

## 2022-04-15 PROCEDURE — 63600175 PHARM REV CODE 636 W HCPCS: Performed by: HOSPITALIST

## 2022-04-15 PROCEDURE — 83735 ASSAY OF MAGNESIUM: CPT | Performed by: HOSPITALIST

## 2022-04-15 PROCEDURE — 99223 PR INITIAL HOSPITAL CARE,LEVL III: ICD-10-PCS | Mod: GC,,, | Performed by: INTERNAL MEDICINE

## 2022-04-15 PROCEDURE — 93010 ELECTROCARDIOGRAM REPORT: CPT | Mod: ,,, | Performed by: INTERNAL MEDICINE

## 2022-04-15 PROCEDURE — 84206 ASSAY OF PROINSULIN: CPT | Performed by: HOSPITALIST

## 2022-04-15 PROCEDURE — 25000242 PHARM REV CODE 250 ALT 637 W/ HCPCS: Performed by: HOSPITALIST

## 2022-04-15 PROCEDURE — 36430 TRANSFUSION BLD/BLD COMPNT: CPT

## 2022-04-15 PROCEDURE — 12000002 HC ACUTE/MED SURGE SEMI-PRIVATE ROOM

## 2022-04-15 RX ORDER — MUPIROCIN 20 MG/G
OINTMENT TOPICAL 2 TIMES DAILY
Status: COMPLETED | OUTPATIENT
Start: 2022-04-15 | End: 2022-04-19

## 2022-04-15 RX ORDER — CARVEDILOL 3.12 MG/1
3.12 TABLET ORAL 2 TIMES DAILY WITH MEALS
COMMUNITY

## 2022-04-15 RX ORDER — BUDESONIDE AND FORMOTEROL FUMARATE DIHYDRATE 160; 4.5 UG/1; UG/1
2 AEROSOL RESPIRATORY (INHALATION) EVERY 12 HOURS
COMMUNITY

## 2022-04-15 RX ORDER — HEPARIN SODIUM 1000 [USP'U]/ML
1000 INJECTION, SOLUTION INTRAVENOUS; SUBCUTANEOUS
Status: DISCONTINUED | OUTPATIENT
Start: 2022-04-15 | End: 2022-04-22 | Stop reason: HOSPADM

## 2022-04-15 RX ORDER — FLUTICASONE FUROATE AND VILANTEROL 100; 25 UG/1; UG/1
1 POWDER RESPIRATORY (INHALATION) DAILY
Status: DISCONTINUED | OUTPATIENT
Start: 2022-04-15 | End: 2022-04-22 | Stop reason: HOSPADM

## 2022-04-15 RX ORDER — HYDRALAZINE HYDROCHLORIDE 25 MG/1
25 TABLET, FILM COATED ORAL 3 TIMES DAILY
COMMUNITY
End: 2022-04-22

## 2022-04-15 RX ORDER — HYDROCODONE BITARTRATE AND ACETAMINOPHEN 500; 5 MG/1; MG/1
TABLET ORAL
Status: DISCONTINUED | OUTPATIENT
Start: 2022-04-15 | End: 2022-04-16

## 2022-04-15 RX ORDER — CARVEDILOL 3.12 MG/1
3.12 TABLET ORAL 2 TIMES DAILY WITH MEALS
Status: DISCONTINUED | OUTPATIENT
Start: 2022-04-15 | End: 2022-04-22 | Stop reason: HOSPADM

## 2022-04-15 RX ORDER — ASPIRIN 81 MG/1
81 TABLET ORAL DAILY
COMMUNITY

## 2022-04-15 RX ORDER — SODIUM CHLORIDE 9 MG/ML
INJECTION, SOLUTION INTRAVENOUS
Status: DISCONTINUED | OUTPATIENT
Start: 2022-04-15 | End: 2022-04-22 | Stop reason: HOSPADM

## 2022-04-15 RX ORDER — SODIUM CHLORIDE 9 MG/ML
INJECTION, SOLUTION INTRAVENOUS ONCE
Status: DISCONTINUED | OUTPATIENT
Start: 2022-04-15 | End: 2022-04-16

## 2022-04-15 RX ORDER — ASPIRIN 81 MG/1
81 TABLET ORAL DAILY
Status: DISCONTINUED | OUTPATIENT
Start: 2022-04-15 | End: 2022-04-16

## 2022-04-15 RX ORDER — HYDRALAZINE HYDROCHLORIDE 25 MG/1
25 TABLET, FILM COATED ORAL 3 TIMES DAILY
Status: DISCONTINUED | OUTPATIENT
Start: 2022-04-15 | End: 2022-04-21

## 2022-04-15 RX ORDER — DEXTROSE MONOHYDRATE 100 MG/ML
INJECTION, SOLUTION INTRAVENOUS CONTINUOUS
Status: DISCONTINUED | OUTPATIENT
Start: 2022-04-15 | End: 2022-04-17

## 2022-04-15 RX ADMIN — MUPIROCIN: 20 OINTMENT TOPICAL at 09:04

## 2022-04-15 RX ADMIN — DEXTROSE 125 ML: 10 SOLUTION INTRAVENOUS at 05:04

## 2022-04-15 RX ADMIN — Medication 24 G: at 08:04

## 2022-04-15 RX ADMIN — HEPARIN SODIUM 5000 UNITS: 5000 INJECTION INTRAVENOUS; SUBCUTANEOUS at 10:04

## 2022-04-15 RX ADMIN — GLUCAGON HYDROCHLORIDE 1 MG: KIT at 10:04

## 2022-04-15 RX ADMIN — LEVOTHYROXINE SODIUM 150 MCG: 150 TABLET ORAL at 07:04

## 2022-04-15 RX ADMIN — ASPIRIN 81 MG: 81 TABLET, COATED ORAL at 09:04

## 2022-04-15 RX ADMIN — DEXTROSE: 10 SOLUTION INTRAVENOUS at 10:04

## 2022-04-15 RX ADMIN — ISOSORBIDE MONONITRATE 20 MG: 20 TABLET ORAL at 09:04

## 2022-04-15 RX ADMIN — HEPARIN SODIUM 5000 UNITS: 5000 INJECTION INTRAVENOUS; SUBCUTANEOUS at 03:04

## 2022-04-15 RX ADMIN — DEXTROSE 250 ML: 10 SOLUTION INTRAVENOUS at 08:04

## 2022-04-15 RX ADMIN — ACETAMINOPHEN 650 MG: 325 TABLET ORAL at 05:04

## 2022-04-15 RX ADMIN — CEFTRIAXONE 2 G: 2 INJECTION, SOLUTION INTRAVENOUS at 05:04

## 2022-04-15 RX ADMIN — FLUTICASONE FUROATE AND VILANTEROL TRIFENATATE 1 PUFF: 100; 25 POWDER RESPIRATORY (INHALATION) at 09:04

## 2022-04-15 RX ADMIN — HYDRALAZINE HYDROCHLORIDE 25 MG: 25 TABLET, FILM COATED ORAL at 09:04

## 2022-04-15 RX ADMIN — CARVEDILOL 3.12 MG: 3.12 TABLET, FILM COATED ORAL at 10:04

## 2022-04-15 NOTE — HOSPITAL COURSE
Patient admitted to hospital service.  Ophthalmology, ID, nephrology consulted.  Started on ceftriaxone. Blood cx collected.  CT scan demonstrated bilateral exophthalmos and inflammatory changes involving left globe.  During admission, patient noted hypoglycemic.  She has had issues at home with the same.  She takes sulfonylurea at home and is a HD patient.  Started on D10gtt, SQ octreotide, glucagon, regular renal diet, q2hr accuchecks.  Also noted to be anemic on admission thought to be secondary to ESRD and acute infection.  Nurse noted maroon stools.  Patient received total of 3U PRBC.  GI consulted and recommended outpatient colonoscopy as well as stool softner and increase fiber diet.  Placed on IV PPI with serial Hb.  Stopped asa and ticagrelor.  Patient noted to have new right first toe ulcer.  Xray indicated osteomyelitis.  Podiatry consulted and performed bedside debridement.  Specimen sent to micro and path.  Incidental adrenal mass found on abdominal US.  Follow up with CT scan recommended at some point in the future. Patient to OR on Wed 4/20 for surgery to left eye. Awaiting to see if eye culture from yesterday 4/20 reveals anything, likely d/c tomorrow with IV ceftriaxone.

## 2022-04-15 NOTE — CONSULTS
Ophthalmology aware of patient, will follow.   Pt sent from ophthal clinic for ID workup and admission.   To be eviscerated vs enucleated next week.

## 2022-04-15 NOTE — PROGRESS NOTES
[facility] ([address] Medicine  Progress Note    Patient Name: Erika Perera  MRN: [id number]  Patient Class: IP- Inpatient   Admission Date: 4/14/2022  Length of Stay: 1 days  Attending Physician: Ila Garcia MD  Primary Care Provider: Minor Bennett MD        Subjective:     Principal Problem:Endophthalmitis        HPI:  83 yo F with PMHx ESRD, CAD, chronic diastolic heart failure, HTN, DM2, and HLD who presents to the ED from ophthalmology clinic for L eye pain and swelling x 1 week. Pt. Reports that she has had progressively worsening L eye pain and vision loss over the course of the last week. Pt. Reports developing floaters and slowly worsening vision loss with swelling of her upper and lower eyelids. Pt. States that she has developed worsening pain in addition to the swelling over the last couple of days which prompted her to schedule an appointment with optometry. Pt. Seen in optometry clinic and was referred to ophthalmology clinic today over concerns for endophthalmitis. She was then sent to the ED for CT orbits and admit for likely need for enucleation and evisceration due to the severity of the disease. Of note, patient was recently admitted to [facility] 4/5-/48 for Klebsiella pneumoniae bacteremia thought to be 2/2 UTI. Culture results show pansensitive klebsiella, and the patient was discharged on PO ciprofloxacin and reports compliance with the regimen. Pt. Denies any fevers, chills, eye discharge, or redness.      Overview/Hospital Course:  Patient admitted to hospital service.  Ophthalmology, ID, nephrology consulted.  Started on ceftriaxone. Blood cx collected.  CT scan demonstrated bilateral exophthalmos and inflammatory changes involving left globe.        Interval History:   She currently denies pain left eye.  She is unable to completely open left eye.  She can see light out of left eye.  Denies headache, nausea or vomiting.     Review of Systems    Constitutional:  Negative for fever.   Eyes:  Positive for photophobia, pain and visual disturbance.   Respiratory:  Negative for shortness of breath.    Cardiovascular:  Negative for chest pain and leg swelling.   Gastrointestinal:  Negative for diarrhea, nausea and vomiting.   Genitourinary:         She is anuric since stating HD in Sept   Musculoskeletal:  Negative for arthralgias and myalgias.   Skin:  Negative for rash.   Neurological:  Negative for headaches.   Objective:     Vital Signs (Most Recent):  Temp: 97.8 °F (36.6 °C) (04/15/22 0904)  Pulse: 69 (04/15/22 0904)  Resp: 18 (04/15/22 0904)  BP: (!) 169/74 (04/15/22 0904)  SpO2: 99 % (04/15/22 0904)   Vital Signs (24h Range):  Temp:  [97.8 °F (36.6 °C)-100 °F (37.8 °C)] 97.8 °F (36.6 °C)  Pulse:  [63-69] 69  Resp:  [16-20] 18  SpO2:  [96 %-100 %] 99 %  BP: (139-203)/(62-91) 169/74     Weight: 81.6 kg (180 lb)  Body mass index is 29.95 kg/m².    Intake/Output Summary (Last 24 hours) at 4/15/2022 1121  Last data filed at 4/14/2022 2015  Gross per 24 hour   Intake 541.31 ml   Output --   Net 541.31 ml      Physical Exam  Vitals and nursing note reviewed.   Constitutional:       Appearance: Normal appearance. She is not ill-appearing.   HENT:      Head: Normocephalic and atraumatic.      Nose: Nose normal.      Mouth/Throat:      Mouth: Mucous membranes are moist.      Pharynx: Oropharynx is clear.   Eyes:      Extraocular Movements: Extraocular movements intact.      Conjunctiva/sclera:      Left eye: Left conjunctiva is injected. Exudate present.      Comments: Left hypopyon.  Unable to visualize pupil.   + ptosis left     Cardiovascular:      Rate and Rhythm: Normal rate and regular rhythm.      Heart sounds: No murmur heard.  Pulmonary:      Effort: Pulmonary effort is normal. No respiratory distress.      Breath sounds: Normal breath sounds.   Abdominal:      General: Bowel sounds are normal.      Palpations: Abdomen is soft.      Tenderness: There is  no guarding.   Musculoskeletal:         General: No deformity. Normal range of motion.   Skin:     General: Skin is warm and dry.      Findings: No rash.   Neurological:      General: No focal deficit present.      Mental Status: She is alert and oriented to person, place, and time. Mental status is at baseline.   Psychiatric:         Mood and Affect: Mood normal.         Behavior: Behavior normal.       Significant Labs: All pertinent labs within the past 24 hours have been reviewed.    Significant Imaging: I have reviewed all pertinent imaging results/findings within the past 24 hours.      Assessment/Plan:      * Endophthalmitis  -- Opthalmology consulted and has seen patient; plan for surgical management next week.   -- CT orbits shows minimal asymmetric enhancement at the periphery of the left globe compared to the right could represent a mild inflammatory or infectious process  - Noted to have recent Klebsiella pneumoniae bacteremia being treated with cipro, potential source. Repeat blood cultures ordered.   - Abx per ID    Hypoglycemia  - patient given snacks and D10; holding diabetes medication  - patient stated that she has has several episodes of hypoglycemia since starting HD     Anemia due to chronic kidney disease, on chronic dialysis  - 1U PRBC  - monitor Hb/Hct     Bacteremia due to Klebsiella pneumoniae  -Recently diagnosed based on blood cultures done at [facility] 4/5, pansensitive  -Suspect bacteremia may be cause of Endogenous bacterial endophthalmitis, repeat blood cultures ordered  - Consult ID        HTN (hypertension)  -Continue home coreg, hydralazine, and isosorbide    DM2 (diabetes mellitus, type 2)  -A1c ordered and pending  - Hold diabetes medication secondary to hypoglycemia      CAD (coronary artery disease)  -No complaints of chest pain, baseline EKG ordered. TTE ordered to rule out vegetetation in setting of bacteremia with concern for endogenous bacterial endophthalmitis  -Continue home  ASA, brilinta, and statin      ESRD (end stage renal disease)  -No acue issues or need for urgent HD, nephrology consulted for regular HD while admitted        VTE Risk Mitigation (From admission, onward)         Ordered     heparin (porcine) injection 1,000 Units  As needed (PRN)         04/15/22 0757     heparin (porcine) injection 5,000 Units  Every 8 hours         04/14/22 1942     IP VTE HIGH RISK PATIENT  Once         04/14/22 1942     Place sequential compression device  Until discontinued         04/14/22 1942                Discharge Planning   ROM:      Code Status: Full Code   Is the patient medically ready for discharge?: No    Reason for patient still in hospital (select all that apply): Patient new problem                     Ila Garcia MD  Department of Hospital Medicine   [facility] - Intensive Care ([place])

## 2022-04-15 NOTE — SUBJECTIVE & OBJECTIVE
Past Medical History:   Diagnosis Date    DM2 (diabetes mellitus, type 2) 4/14/2022    HTN (hypertension) 4/14/2022       Past Surgical History:   Procedure Laterality Date    CATARACT EXTRACTION Bilateral        Review of patient's allergies indicates:   Allergen Reactions    Amlodipine Other (See Comments)    Atorvastatin Other (See Comments)    Nebivolol Other (See Comments)       Medications:  Medications Prior to Admission   Medication Sig    albuterol (PROVENTIL/VENTOLIN HFA) 90 mcg/actuation inhaler Inhale 2 puffs by mouth every 4  to 6 hours as needed    aspirin (ECOTRIN) 81 MG EC tablet Take 81 mg by mouth once daily.    blood sugar diagnostic Strp by Misc.(Non-Drug; Combo Route) route    budesonide-formoterol 160-4.5 mcg (SYMBICORT) 160-4.5 mcg/actuation HFAA Inhale 2 puffs into the lungs every 12 (twelve) hours. Controller    calcitRIOL (ROCALTROL) 0.25 MCG Cap Take 1 capsule by mouth once daily.    carvediloL (COREG) 3.125 MG tablet Take 3.125 mg by mouth 2 (two) times daily with meals.    CIPRO 250 mg tablet Take 250 mg by mouth once daily.    glimepiride (AMARYL) 4 MG tablet Take 1 tablet by mouth every morning.    hydrALAZINE (APRESOLINE) 100 MG tablet Take 1 tablet by mouth 2 (two) times daily.    hydrALAZINE (APRESOLINE) 25 MG tablet Take 25 mg by mouth 3 (three) times daily.    isosorbide mononitrate (ISMO,MONOKET) 20 MG Tab Take 1 tablet by mouth 2 (two) times daily.    levothyroxine (SYNTHROID) 150 MCG tablet Take 1 tablet by mouth 30 minutes before breakfast    montelukast (SINGULAIR) 10 mg tablet Take 1 tablet by mouth nightly.     Antibiotics (From admission, onward)                Start     Stop Route Frequency Ordered    04/15/22 1800  cefTRIAXone (ROCEPHIN) 2 g/50 mL D5W IVPB         -- IV Every 24 hours (non-standard times) 04/14/22 2117    04/15/22 0900  mupirocin 2 % ointment         04/20 0859 Nasl 2 times daily 04/15/22 0756          Antifungals (From admission, onward)                 None          Antivirals (From admission, onward)      None             Immunization History   Administered Date(s) Administered    COVID-19, MRNA, LN-S, PF (MODERNA FULL 0.5 ML DOSE) 02/15/2021, 03/15/2021       Family History       Problem Relation (Age of Onset)    No Known Problems Mother, Father, Sister, Brother, Maternal Aunt, Maternal Uncle, Paternal Aunt, Paternal Uncle, Maternal Grandmother, Maternal Grandfather, Paternal Grandmother, Paternal Grandfather          Social History     Socioeconomic History    Marital status: Single   Tobacco Use    Smoking status: Never Smoker    Smokeless tobacco: Never Used     Review of Systems   Constitutional:  Positive for activity change. Negative for appetite change, chills and fever.   HENT:  Negative for congestion.    Eyes:  Positive for pain and visual disturbance (left eye). Negative for itching.   Respiratory:  Negative for cough, chest tightness and shortness of breath.    Cardiovascular:  Negative for palpitations and leg swelling.   Gastrointestinal:  Negative for abdominal pain and nausea.   Endocrine: Negative for polyphagia and polyuria.   Genitourinary:  Negative for dysuria and frequency.   Musculoskeletal:  Negative for back pain.   Neurological:  Negative for numbness and headaches.   Psychiatric/Behavioral:  Negative for agitation and behavioral problems.    Objective:     Vital Signs (Most Recent):  Temp: 99.7 °F (37.6 °C) (04/15/22 1635)  Pulse: 60 (04/15/22 1635)  Resp: 19 (04/15/22 1635)  BP: (!) 163/72 (04/15/22 1635)  SpO2: (!) 94 % (04/15/22 1635)   Vital Signs (24h Range):  Temp:  [97.5 °F (36.4 °C)-100 °F (37.8 °C)] 99.7 °F (37.6 °C)  Pulse:  [59-69] 60  Resp:  [17-20] 19  SpO2:  [94 %-100 %] 94 %  BP: (139-203)/(62-94) 163/72     Weight: 81.6 kg (180 lb)  Body mass index is 29.95 kg/m².    Estimated Creatinine Clearance: 9.7 mL/min (A) (based on SCr of 4.7 mg/dL (H)).    Physical Exam  Constitutional:       Appearance: She is well-developed.    HENT:      Head: Normocephalic.   Eyes:      General:         Left eye: Discharge present.     Conjunctiva/sclera:      Left eye: Chemosis present.   Cardiovascular:      Rate and Rhythm: Normal rate and regular rhythm.      Heart sounds: Normal heart sounds. No murmur heard.  Pulmonary:      Effort: Pulmonary effort is normal.      Breath sounds: Normal breath sounds.   Abdominal:      General: Bowel sounds are normal. There is no distension.      Palpations: Abdomen is soft.      Tenderness: There is no abdominal tenderness.   Musculoskeletal:         General: Normal range of motion.   Neurological:      Mental Status: She is alert and oriented to person, place, and time.   Psychiatric:         Behavior: Behavior normal.       Significant Labs: All pertinent labs within the past 24 hours have been reviewed.    Significant Imaging: I have reviewed all pertinent imaging results/findings within the past 24 hours.

## 2022-04-15 NOTE — SUBJECTIVE & OBJECTIVE
No past medical history on file.    Past Surgical History:   Procedure Laterality Date    CATARACT EXTRACTION Bilateral        Review of patient's allergies indicates:   Allergen Reactions    Amlodipine Other (See Comments)    Atorvastatin Other (See Comments)    Nebivolol Other (See Comments)       Current Facility-Administered Medications on File Prior to Encounter   Medication    [DISCONTINUED] acetaminophen tablet    [DISCONTINUED] albuterol nebulizer solution    [DISCONTINUED] aspirin EC tablet    [DISCONTINUED] budesonide-formoterol 160-4.5 mcg inhaler    [DISCONTINUED] carvediloL tablet    [DISCONTINUED] ceFAZolin 2 gram in sodium chloride 0.9% 100 mL IVPB (premix)    [DISCONTINUED] dextrose 15 gram/59 mL oral liquid    [DISCONTINUED] dextrose 50 % in water (D50W) injection    [DISCONTINUED] epoetin maximilian injection    [DISCONTINUED] ergocalciferol capsule    [DISCONTINUED] GENERIC EXTERNAL MEDICATION    [DISCONTINUED] glucagon SolR    [DISCONTINUED] heparin (porcine) injection    [DISCONTINUED] hydrALAZINE injection    [DISCONTINUED] hydrALAZINE tablet    [DISCONTINUED] insulin lispro injection    [DISCONTINUED] isosorbide mononitrate tablet    [DISCONTINUED] levothyroxine tablet    [DISCONTINUED] montelukast tablet    [DISCONTINUED] ondansetron injection    [DISCONTINUED] rosuvastatin tablet    [DISCONTINUED] ticagrelor tablet    [DISCONTINUED] traMADoL tablet     Current Outpatient Medications on File Prior to Encounter   Medication Sig    albuterol (PROVENTIL/VENTOLIN HFA) 90 mcg/actuation inhaler Inhale 2 puffs by mouth every 4  to 6 hours as needed    amLODIPine (NORVASC) 10 MG tablet Take 1 tablet by mouth once daily.    blood sugar diagnostic Strp by Misc.(Non-Drug; Combo Route) route    calcitRIOL (ROCALTROL) 0.25 MCG Cap Take 1 capsule by mouth once daily.    CIPRO 250 mg tablet Take 250 mg by mouth once daily.    cloNIDine (CATAPRES) 0.2 MG tablet Take 1 tablet by mouth 2 (two) times daily.     furosemide (LASIX) 20 MG tablet Take 1 tablet by mouth once daily.    gabapentin (NEURONTIN) 100 MG capsule Take 1 capsule by mouth.    glimepiride (AMARYL) 4 MG tablet Take 1 tablet by mouth every morning.    hydrALAZINE (APRESOLINE) 100 MG tablet Take 1 tablet by mouth 2 (two) times daily.    isosorbide mononitrate (ISMO,MONOKET) 20 MG Tab Take 1 tablet by mouth 2 (two) times daily.    levocetirizine (XYZAL) 5 MG tablet Take 1 tablet by mouth every evening.    levoFLOXacin (LEVAQUIN) 750 MG tablet     levothyroxine (SYNTHROID, LEVOTHROID) 175 MCG tablet Take 1 tablet by mouth 30 minutes before breakfast    montelukast (SINGULAIR) 10 mg tablet Take 1 tablet by mouth nightly.    propranoloL (INDERAL) 20 MG tablet Take 1 tablet by mouth 2 (two) times daily.    simvastatin (ZOCOR) 10 MG tablet Take 1 tablet by mouth nightly.     Family History       Problem Relation (Age of Onset)    No Known Problems Mother, Father, Sister, Brother, Maternal Aunt, Maternal Uncle, Paternal Aunt, Paternal Uncle, Maternal Grandmother, Maternal Grandfather, Paternal Grandmother, Paternal Grandfather          Tobacco Use    Smoking status: Never Smoker    Smokeless tobacco: Never Used   Substance and Sexual Activity    Alcohol use: Not on file    Drug use: Not on file    Sexual activity: Not on file     Review of Systems   Constitutional:  Negative for activity change, appetite change, chills, fever and unexpected weight change.   HENT:  Negative for congestion and sore throat.    Eyes:  Positive for pain, redness and visual disturbance. Negative for discharge.   Respiratory:  Negative for cough and shortness of breath.    Cardiovascular:  Negative for chest pain, palpitations and leg swelling.   Gastrointestinal:  Negative for abdominal distention, abdominal pain, blood in stool, constipation, diarrhea, nausea and vomiting.   Genitourinary:  Negative for difficulty urinating, dysuria and hematuria.   Musculoskeletal:  Positive for  arthralgias. Negative for myalgias.   Skin:  Negative for color change and rash.   Neurological:  Negative for dizziness, tremors and seizures.   Objective:     Vital Signs (Most Recent):  Temp: 98.8 °F (37.1 °C) (04/14/22 1454)  Pulse: 66 (04/14/22 1944)  Resp: 20 (04/14/22 1944)  BP: (!) 151/91 (04/14/22 1944)  SpO2: 100 % (04/14/22 1944)   Vital Signs (24h Range):  Temp:  [98.8 °F (37.1 °C)] 98.8 °F (37.1 °C)  Pulse:  [63-66] 66  Resp:  [16-20] 20  SpO2:  [98 %-100 %] 100 %  BP: (151-153)/(64-91) 151/91     Weight: 81.6 kg (180 lb)  Body mass index is 29.95 kg/m².    Physical Exam  Vitals reviewed.   Constitutional:       General: She is not in acute distress.     Appearance: She is well-developed.   HENT:      Head: Normocephalic and atraumatic.   Eyes:      Extraocular Movements: Extraocular movements intact.      Pupils: Pupils are equal, round, and reactive to light.      Comments: L eye with upper and lower eyelid edema and tenderness, L eye hypopyon   Neck:      Vascular: No JVD.      Trachea: No tracheal deviation.   Cardiovascular:      Rate and Rhythm: Normal rate and regular rhythm.      Heart sounds: No murmur heard.    No friction rub. No gallop.   Pulmonary:      Effort: No respiratory distress.      Breath sounds: Normal breath sounds. No wheezing or rales.   Abdominal:      General: Bowel sounds are normal. There is no distension.      Palpations: Abdomen is soft. There is no mass.      Tenderness: There is no abdominal tenderness.   Musculoskeletal:         General: No deformity.      Cervical back: Neck supple.   Lymphadenopathy:      Cervical: No cervical adenopathy.   Skin:     General: Skin is warm and dry.      Findings: No rash.   Neurological:      Mental Status: She is alert and oriented to person, place, and time.         CRANIAL NERVES     CN III, IV, VI   Pupils are equal, round, and reactive to light.     Significant Labs: All pertinent labs within the past 24 hours have been  reviewed.  CBC:   Recent Labs   Lab 04/14/22  1625   WBC 13.85*   HGB 7.7*   HCT 25.5*        CMP:   Recent Labs   Lab 04/14/22  1625      K 4.1   CL 96   CO2 28   GLU 71   BUN 33*   CREATININE 4.5*   CALCIUM 9.1   ANIONGAP 13   EGFRNONAA 8.5*       Significant Imaging: I have reviewed all pertinent imaging results/findings within the past 24 hours.

## 2022-04-15 NOTE — PLAN OF CARE
Problem: Adult Inpatient Plan of Care  Goal: Plan of Care Review  Outcome: Ongoing, Progressing  Flowsheets (Taken 4/15/2022 1824)  Plan of Care Reviewed With:   patient   family  Goal: Patient-Specific Goal (Individualized)  Outcome: Ongoing, Progressing  Goal: Absence of Hospital-Acquired Illness or Injury  Outcome: Ongoing, Progressing  Intervention: Identify and Manage Fall Risk  Flowsheets (Taken 4/15/2022 1824)  Safety Promotion/Fall Prevention:   assistive device/personal item within reach   side rails raised x 2   instructed to call staff for mobility  Intervention: Prevent Skin Injury  Flowsheets (Taken 4/15/2022 1824)  Skin Protection:   incontinence pads utilized   tubing/devices free from skin contact  Intervention: Prevent and Manage VTE (Venous Thromboembolism) Risk  Flowsheets (Taken 4/15/2022 1824)  VTE Prevention/Management:   bleeding precations maintained   bleeding risk assessed  Intervention: Prevent Infection  Flowsheets (Taken 4/15/2022 1824)  Infection Prevention: single patient room provided  Goal: Optimal Comfort and Wellbeing  Outcome: Ongoing, Progressing    Patient alert and oriented x4; care plan discussed with patient and family; care plan discussed with patient and family; call light and personal belongings within reach; free from falls/injuries during the shift; blood glucose monitored throughout the shift; D10 given x2 doses, 250 mL first, then 125 mL second dose; blood sugar increased afterward with improvement; 1u PRBC given, patient tolerated well; patient receiving dialysis either tonight/overnight or in the AM; specialty bed ordered; wound care consult ordered for toe and sacral wounds; placing patient on waffle mattress until specialty bed arrives; will continue to monitor.

## 2022-04-15 NOTE — HPI
Erika Perera is an 83 y/o female with a hx of ESRD on HD MWF vis AV LUE, CAD, HF, HTN, DM2, and HLD who presents to the ED from ophthalmology clinic for L eye pain and swelling x 1 week.     Patient was recently admitted to OSH 4/5-4/8 for Klebsiella pneumoniae bacteremia thought to be 2/2 UTI. Culture results show pansensitive klebsiella, and the patient was discharged on PO ciprofloxacin and reports compliance with the regimen. She was Seen in optometry clinic and was referred to ophthalmology clinic 04/14 over concerns for endophthalmitis. She was then sent to the ED for CT orbits and admit for likely need for enucleation and evisceration due to the severity of the disease.    She has been having worsening vision and eye swelling for 1 week, denies any fever chills, N/V abdominal pain or diarrhea. She denies any eye truama.

## 2022-04-15 NOTE — HPI
83 yo F with PMHx ESRD (MWF schedule), CAD, chronic diastolic heart failure, HTN, DM2, and HLD who presents to the ED from ophthalmology clinic for L eye pain and swelling x 1 week. Pt. Reports that she has had progressively worsening L eye pain and vision loss over the course of the last week. Pt. Reports developing floaters and slowly worsening vision loss with swelling of her upper and lower eyelids. Pt. States that she has developed worsening pain in addition to the swelling over the last couple of days which prompted her to schedule an appointment with optometry. Pt. Seen in optometry clinic and was referred to ophthalmology clinic today over concerns for endophthalmitis. She was then sent to the ED for CT orbits and admit for likely need for enucleation and evisceration due to the severity of the disease. Of note, patient was recently admitted to [facility] 4/5-/48 for Klebsiella pneumoniae bacteremia thought to be 2/2 UTI. Culture results show pansensitive klebsiella, and the patient was discharged on PO ciprofloxacin and reports compliance with the regimen. Pt. Denies any fevers, chills, eye discharge, or redness.She completed her last hemodialysis on Wednesday. Nephrology consulted for management of ESRD.

## 2022-04-15 NOTE — SUBJECTIVE & OBJECTIVE
Interval History:   She currently denies pain left eye.  She is unable to completely open left eye.  She can see light out of left eye.  Denies headache, nausea or vomiting.     Review of Systems   Constitutional:  Negative for fever.   Eyes:  Positive for photophobia, pain and visual disturbance.   Respiratory:  Negative for shortness of breath.    Cardiovascular:  Negative for chest pain and leg swelling.   Gastrointestinal:  Negative for diarrhea, nausea and vomiting.   Genitourinary:         She is anuric since stating HD in Sept   Musculoskeletal:  Negative for arthralgias and myalgias.   Skin:  Negative for rash.   Neurological:  Negative for headaches.   Objective:     Vital Signs (Most Recent):  Temp: 97.8 °F (36.6 °C) (04/15/22 0904)  Pulse: 69 (04/15/22 0904)  Resp: 18 (04/15/22 0904)  BP: (!) 169/74 (04/15/22 0904)  SpO2: 99 % (04/15/22 0904)   Vital Signs (24h Range):  Temp:  [97.8 °F (36.6 °C)-100 °F (37.8 °C)] 97.8 °F (36.6 °C)  Pulse:  [63-69] 69  Resp:  [16-20] 18  SpO2:  [96 %-100 %] 99 %  BP: (139-203)/(62-91) 169/74     Weight: 81.6 kg (180 lb)  Body mass index is 29.95 kg/m².    Intake/Output Summary (Last 24 hours) at 4/15/2022 1121  Last data filed at 4/14/2022 2015  Gross per 24 hour   Intake 541.31 ml   Output --   Net 541.31 ml      Physical Exam  Vitals and nursing note reviewed.   Constitutional:       Appearance: Normal appearance. She is not ill-appearing.   HENT:      Head: Normocephalic and atraumatic.      Nose: Nose normal.      Mouth/Throat:      Mouth: Mucous membranes are moist.      Pharynx: Oropharynx is clear.   Eyes:      Extraocular Movements: Extraocular movements intact.      Conjunctiva/sclera:      Left eye: Left conjunctiva is injected. Exudate present.      Comments: Left hypopyon.  Unable to visualize pupil.   + ptosis left     Cardiovascular:      Rate and Rhythm: Normal rate and regular rhythm.      Heart sounds: No murmur heard.  Pulmonary:      Effort: Pulmonary  effort is normal. No respiratory distress.      Breath sounds: Normal breath sounds.   Abdominal:      General: Bowel sounds are normal.      Palpations: Abdomen is soft.      Tenderness: There is no guarding.   Musculoskeletal:         General: No deformity. Normal range of motion.   Skin:     General: Skin is warm and dry.      Findings: No rash.   Neurological:      General: No focal deficit present.      Mental Status: She is alert and oriented to person, place, and time. Mental status is at baseline.   Psychiatric:         Mood and Affect: Mood normal.         Behavior: Behavior normal.       Significant Labs: All pertinent labs within the past 24 hours have been reviewed.    Significant Imaging: I have reviewed all pertinent imaging results/findings within the past 24 hours.

## 2022-04-15 NOTE — H&P
[facility] ([address] Medicine  History & Physical    Patient Name: Erika Perera  MRN: [id number]  Patient Class: IP- Inpatient  Admission Date: 4/14/2022  Attending Physician: Ila Garcia MD   Primary Care Provider: Minor Bennett MD         Patient information was obtained from patient, past medical records and ER records.     Subjective:     Principal Problem:Endophthalmitis    Chief Complaint:   Chief Complaint   Patient presents with    Eye Problem     Left eye drooping eyelid x 2 weeks        HPI: 83 yo F with PMHx ESRD, CAD, chronic diastolic heart failure, HTN, DM2, and HLD who presents to the ED from ophthalmology clinic for L eye pain and swelling x 1 week. Pt. Reports that she has had progressively worsening L eye pain and vision loss over the course of the last week. Pt. Reports developing floaters and slowly worsening vision loss with swelling of her upper and lower eyelids. Pt. States that she has developed worsening pain in addition to the swelling over the last couple of days which prompted her to schedule an appointment with optometry. Pt. Seen in optometry clinic and was referred to ophthalmology clinic today over concerns for endophthalmitis. She was then sent to the ED for CT orbits and admit for likely need for enucleation and evisceration due to the severity of the disease. Of note, patient was recently admitted to [facility] 4/5-/48 for Klebsiella pneumoniae bacteremia thought to be 2/2 UTI. Culture results show pansensitive klebsiella, and the patient was discharged on PO ciprofloxacin and reports compliance with the regimen. Pt. Denies any fevers, chills, eye discharge, or redness.      No past medical history on file.    Past Surgical History:   Procedure Laterality Date    CATARACT EXTRACTION Bilateral        Review of patient's allergies indicates:   Allergen Reactions    Amlodipine Other (See Comments)    Atorvastatin Other (See Comments)     Nebivolol Other (See Comments)       Current Facility-Administered Medications on File Prior to Encounter   Medication    [DISCONTINUED] acetaminophen tablet    [DISCONTINUED] albuterol nebulizer solution    [DISCONTINUED] aspirin EC tablet    [DISCONTINUED] budesonide-formoterol 160-4.5 mcg inhaler    [DISCONTINUED] carvediloL tablet    [DISCONTINUED] ceFAZolin 2 gram in sodium chloride 0.9% 100 mL IVPB (premix)    [DISCONTINUED] dextrose 15 gram/59 mL oral liquid    [DISCONTINUED] dextrose 50 % in water (D50W) injection    [DISCONTINUED] epoetin maximilian injection    [DISCONTINUED] ergocalciferol capsule    [DISCONTINUED] GENERIC EXTERNAL MEDICATION    [DISCONTINUED] glucagon SolR    [DISCONTINUED] heparin (porcine) injection    [DISCONTINUED] hydrALAZINE injection    [DISCONTINUED] hydrALAZINE tablet    [DISCONTINUED] insulin lispro injection    [DISCONTINUED] isosorbide mononitrate tablet    [DISCONTINUED] levothyroxine tablet    [DISCONTINUED] montelukast tablet    [DISCONTINUED] ondansetron injection    [DISCONTINUED] rosuvastatin tablet    [DISCONTINUED] ticagrelor tablet    [DISCONTINUED] traMADoL tablet     Current Outpatient Medications on File Prior to Encounter   Medication Sig    albuterol (PROVENTIL/VENTOLIN HFA) 90 mcg/actuation inhaler Inhale 2 puffs by mouth every 4  to 6 hours as needed    amLODIPine (NORVASC) 10 MG tablet Take 1 tablet by mouth once daily.    blood sugar diagnostic Strp by Misc.(Non-Drug; Combo Route) route    calcitRIOL (ROCALTROL) 0.25 MCG Cap Take 1 capsule by mouth once daily.    CIPRO 250 mg tablet Take 250 mg by mouth once daily.    cloNIDine (CATAPRES) 0.2 MG tablet Take 1 tablet by mouth 2 (two) times daily.    furosemide (LASIX) 20 MG tablet Take 1 tablet by mouth once daily.    gabapentin (NEURONTIN) 100 MG capsule Take 1 capsule by mouth.    glimepiride (AMARYL) 4 MG tablet Take 1 tablet by mouth every morning.    hydrALAZINE (APRESOLINE)  100 MG tablet Take 1 tablet by mouth 2 (two) times daily.    isosorbide mononitrate (ISMO,MONOKET) 20 MG Tab Take 1 tablet by mouth 2 (two) times daily.    levocetirizine (XYZAL) 5 MG tablet Take 1 tablet by mouth every evening.    levoFLOXacin (LEVAQUIN) 750 MG tablet     levothyroxine (SYNTHROID, LEVOTHROID) 175 MCG tablet Take 1 tablet by mouth 30 minutes before breakfast    montelukast (SINGULAIR) 10 mg tablet Take 1 tablet by mouth nightly.    propranoloL (INDERAL) 20 MG tablet Take 1 tablet by mouth 2 (two) times daily.    simvastatin (ZOCOR) 10 MG tablet Take 1 tablet by mouth nightly.     Family History       Problem Relation (Age of Onset)    No Known Problems Mother, Father, Sister, Brother, Maternal Aunt, Maternal Uncle, Paternal Aunt, Paternal Uncle, Maternal Grandmother, Maternal Grandfather, Paternal Grandmother, Paternal Grandfather          Tobacco Use    Smoking status: Never Smoker    Smokeless tobacco: Never Used   Substance and Sexual Activity    Alcohol use: Not on file    Drug use: Not on file    Sexual activity: Not on file     Review of Systems   Constitutional:  Negative for activity change, appetite change, chills, fever and unexpected weight change.   HENT:  Negative for congestion and sore throat.    Eyes:  Positive for pain, redness and visual disturbance. Negative for discharge.   Respiratory:  Negative for cough and shortness of breath.    Cardiovascular:  Negative for chest pain, palpitations and leg swelling.   Gastrointestinal:  Negative for abdominal distention, abdominal pain, blood in stool, constipation, diarrhea, nausea and vomiting.   Genitourinary:  Negative for difficulty urinating, dysuria and hematuria.   Musculoskeletal:  Positive for arthralgias. Negative for myalgias.   Skin:  Negative for color change and rash.   Neurological:  Negative for dizziness, tremors and seizures.   Objective:     Vital Signs (Most Recent):  Temp: 98.8 °F (37.1 °C) (04/14/22  1454)  Pulse: 66 (04/14/22 1944)  Resp: 20 (04/14/22 1944)  BP: (!) 151/91 (04/14/22 1944)  SpO2: 100 % (04/14/22 1944)   Vital Signs (24h Range):  Temp:  [98.8 °F (37.1 °C)] 98.8 °F (37.1 °C)  Pulse:  [63-66] 66  Resp:  [16-20] 20  SpO2:  [98 %-100 %] 100 %  BP: (151-153)/(64-91) 151/91     Weight: 81.6 kg (180 lb)  Body mass index is 29.95 kg/m².    Physical Exam  Vitals reviewed.   Constitutional:       General: She is not in acute distress.     Appearance: She is well-developed.   HENT:      Head: Normocephalic and atraumatic.   Eyes:      Extraocular Movements: Extraocular movements intact.      Pupils: Pupils are equal, round, and reactive to light.      Comments: L eye with upper and lower eyelid edema and tenderness, L eye hypopyon   Neck:      Vascular: No JVD.      Trachea: No tracheal deviation.   Cardiovascular:      Rate and Rhythm: Normal rate and regular rhythm.      Heart sounds: No murmur heard.    No friction rub. No gallop.   Pulmonary:      Effort: No respiratory distress.      Breath sounds: Normal breath sounds. No wheezing or rales.   Abdominal:      General: Bowel sounds are normal. There is no distension.      Palpations: Abdomen is soft. There is no mass.      Tenderness: There is no abdominal tenderness.   Musculoskeletal:         General: No deformity.      Cervical back: Neck supple.   Lymphadenopathy:      Cervical: No cervical adenopathy.   Skin:     General: Skin is warm and dry.      Findings: No rash.   Neurological:      Mental Status: She is alert and oriented to person, place, and time.         CRANIAL NERVES     CN III, IV, VI   Pupils are equal, round, and reactive to light.     Significant Labs: All pertinent labs within the past 24 hours have been reviewed.  CBC:   Recent Labs   Lab 04/14/22  1625   WBC 13.85*   HGB 7.7*   HCT 25.5*        CMP:   Recent Labs   Lab 04/14/22  1625      K 4.1   CL 96   CO2 28   GLU 71   BUN 33*   CREATININE 4.5*   CALCIUM 9.1    ANIONGAP 13   EGFRNONAA 8.5*       Significant Imaging: I have reviewed all pertinent imaging results/findings within the past 24 hours.    Assessment/Plan:     * Endophthalmitis  -Pt. With progressively worsening vision loss and eye pain, swollen eyelids and hypopyon  -CT orbits shows minimal asymmetric enhancement at the periphery of the left globe compared to the right could represent a mild inflammatory or infectious process  -Pt. Noted to have recent Klebsiella pneumoniae bacteremia being treated with cipro, potential source. Repeat blood cultures ordered. IV vanc and ceftriaxone started for broad spectrum therapy in ED, will continue with ceftriaxone only for treatment of klebsiella; consider ID consult for further asssitance. Ophthalmology consulted and plan for enucleation and evisceration due to severity of disease.      Bacteremia due to Klebsiella pneumoniae  -Recently diagnosed based on blood cultures done at [facility] 4/5, pansensitive  -Suspect bacteremia may be cause of Endogenous bacterial endophthalmitis, repeat blood cultures ordered  -PO cipro being used as outpatient, will give IV ceftriaxone while admitted, f/u repeat blood cultures.       HTN (hypertension)  -Continue home coreg, hydralazine, and isosorbide    DM2 (diabetes mellitus, type 2)  -A1c ordered and pending  -Hold home amaryl while admitted, low dose SSI ordered in place      CAD (coronary artery disease)  -No complaints of chest pain, baseline EKG ordered. TTE ordered to rule out vegetetation in setting of bacteremia with concern for endogenous bacterial endophthalmitis  -Continue home ASA, brilinta, and statin      ESRD (end stage renal disease)  -No acue issues or need for urgent HD, nephrology consulted for regular HD while admitted      VTE Risk Mitigation (From admission, onward)         Ordered     heparin (porcine) injection 5,000 Units  Every 8 hours         04/14/22 1942     IP VTE HIGH RISK PATIENT  Once         04/14/22 1942      Place sequential compression device  Until discontinued         04/14/22 1942                   Esau Reyes MD  Department of Hospital Medicine   [facility] - Intensive Care ([place])

## 2022-04-15 NOTE — SUBJECTIVE & OBJECTIVE
Past Medical History:   Diagnosis Date    DM2 (diabetes mellitus, type 2) 4/14/2022    HTN (hypertension) 4/14/2022       Past Surgical History:   Procedure Laterality Date    CATARACT EXTRACTION Bilateral        Review of patient's allergies indicates:   Allergen Reactions    Amlodipine Other (See Comments)    Atorvastatin Other (See Comments)    Nebivolol Other (See Comments)     Current Facility-Administered Medications   Medication Frequency    0.9%  NaCl infusion (for blood administration) Q24H PRN    0.9%  NaCl infusion Once    acetaminophen tablet 650 mg Q4H PRN    albuterol-ipratropium 2.5 mg-0.5 mg/3 mL nebulizer solution 3 mL Q6H PRN    aspirin EC tablet 81 mg Daily    carvediloL tablet 3.125 mg BID WM    cefTRIAXone (ROCEPHIN) 2 g/50 mL D5W IVPB Q24H    dextrose 10% bolus 125 mL PRN    dextrose 10% bolus 250 mL PRN    fluticasone furoate-vilanteroL 100-25 mcg/dose diskus inhaler 1 puff Daily    glucagon (human recombinant) injection 1 mg PRN    glucose chewable tablet 16 g PRN    glucose chewable tablet 24 g PRN    heparin (porcine) injection 1,000 Units PRN    heparin (porcine) injection 5,000 Units Q8H    hydrALAZINE tablet 25 mg TID    insulin aspart U-100 pen 0-5 Units QID (AC + HS) PRN    isosorbide mononitrate tablet 20 mg BID    levothyroxine tablet 150 mcg Daily    melatonin tablet 6 mg Nightly PRN    mupirocin 2 % ointment BID    naloxone 0.4 mg/mL injection 0.02 mg PRN    ondansetron injection 4 mg Q8H PRN    prochlorperazine injection Soln 5 mg Q6H PRN    sodium chloride 0.9% bolus 250 mL PRN    sodium chloride 0.9% flush 10 mL PRN     Family History       Problem Relation (Age of Onset)    No Known Problems Mother, Father, Sister, Brother, Maternal Aunt, Maternal Uncle, Paternal Aunt, Paternal Uncle, Maternal Grandmother, Maternal Grandfather, Paternal Grandmother, Paternal Grandfather          Tobacco Use    Smoking status: Never Smoker    Smokeless tobacco: Never Used   Substance and Sexual  Activity    Alcohol use: Not on file    Drug use: Not on file    Sexual activity: Not on file     Review of Systems   Constitutional:  Positive for activity change and appetite change. Negative for fatigue.   Cardiovascular:  Positive for leg swelling. Negative for chest pain.   Gastrointestinal:  Negative for abdominal distention, abdominal pain, diarrhea, nausea and vomiting.   Genitourinary:  Negative for flank pain.   Objective:     Vital Signs (Most Recent):  Temp: 97.8 °F (36.6 °C) (04/15/22 0904)  Pulse: 69 (04/15/22 0904)  Resp: 18 (04/15/22 0904)  BP: (!) 169/74 (04/15/22 0904)  SpO2: 99 % (04/15/22 0904)  O2 Device (Oxygen Therapy): room air (04/15/22 0904)   Vital Signs (24h Range):  Temp:  [97.8 °F (36.6 °C)-100 °F (37.8 °C)] 97.8 °F (36.6 °C)  Pulse:  [63-69] 69  Resp:  [16-20] 18  SpO2:  [96 %-100 %] 99 %  BP: (139-203)/(62-91) 169/74     Weight: 81.6 kg (180 lb) (04/14/22 1454)  Body mass index is 29.95 kg/m².  Body surface area is 1.93 meters squared.    I/O last 3 completed shifts:  In: 541.3 [IV Piggyback:541.3]  Out: -     Physical Exam  Constitutional:       General: She is not in acute distress.  HENT:      Head: Normocephalic.      Comments: L eye orbital and periorbital edema   Cardiovascular:      Rate and Rhythm: Normal rate.   Pulmonary:      Effort: Pulmonary effort is normal. No respiratory distress.   Abdominal:      Palpations: Abdomen is soft.      Tenderness: There is no abdominal tenderness.   Musculoskeletal:      Right lower leg: No edema.      Left lower leg: No edema.   Skin:     General: Skin is warm.   Neurological:      Mental Status: She is alert and oriented to person, place, and time.   Psychiatric:         Mood and Affect: Mood normal.         Behavior: Behavior normal.       Significant Labs:  CBC:   Recent Labs   Lab 04/15/22  0238   WBC 11.88   RBC 2.17*   HGB 6.4*   HCT 21.1*      MCV 97   MCH 29.5   MCHC 30.3*     CMP:   Recent Labs   Lab 04/15/22  0238   GLU  71   CALCIUM 8.7   *   K 4.2   CO2 26   CL 95   BUN 38*   CREATININE 4.7*

## 2022-04-15 NOTE — CONSULTS
[facility] ([place] [address])  Nephrology  Consult Note    Patient Name: Erika Perera  MRN: [id number]  Admission Date: 4/14/2022  Hospital Length of Stay: 1 days  Attending Provider: Ila Garcia MD   Primary Care Physician: Minor Bennett MD  Principal Problem:Endophthalmitis    Inpatient consult to Nephrology  Consult performed by: Oniel Freeman MD  Consult ordered by: Esau Reyes MD  Reason for consult: ESRD on HD         Subjective:     HPI: 81 yo F with PMHx ESRD (MWF schedule), CAD, chronic diastolic heart failure, HTN, DM2, and HLD who presents to the ED from ophthalmology clinic for L eye pain and swelling x 1 week. Pt. Reports that she has had progressively worsening L eye pain and vision loss over the course of the last week. Pt. Reports developing floaters and slowly worsening vision loss with swelling of her upper and lower eyelids. Pt. States that she has developed worsening pain in addition to the swelling over the last couple of days which prompted her to schedule an appointment with optometry. Pt. Seen in optometry clinic and was referred to ophthalmology clinic today over concerns for endophthalmitis. She was then sent to the ED for CT orbits and admit for likely need for enucleation and evisceration due to the severity of the disease. Of note, patient was recently admitted to [facility] 4/5-/48 for Klebsiella pneumoniae bacteremia thought to be 2/2 UTI. Culture results show pansensitive klebsiella, and the patient was discharged on PO ciprofloxacin and reports compliance with the regimen. Pt. Denies any fevers, chills, eye discharge, or redness.She completed her last hemodialysis on Wednesday. Nephrology consulted for management of ESRD.          Past Medical History:   Diagnosis Date    DM2 (diabetes mellitus, type 2) 4/14/2022    HTN (hypertension) 4/14/2022       Past Surgical History:   Procedure Laterality Date    CATARACT EXTRACTION Bilateral         Review of patient's allergies indicates:   Allergen Reactions    Amlodipine Other (See Comments)    Atorvastatin Other (See Comments)    Nebivolol Other (See Comments)     Current Facility-Administered Medications   Medication Frequency    0.9%  NaCl infusion (for blood administration) Q24H PRN    0.9%  NaCl infusion Once    acetaminophen tablet 650 mg Q4H PRN    albuterol-ipratropium 2.5 mg-0.5 mg/3 mL nebulizer solution 3 mL Q6H PRN    aspirin EC tablet 81 mg Daily    carvediloL tablet 3.125 mg BID WM    cefTRIAXone (ROCEPHIN) 2 g/50 mL D5W IVPB Q24H    dextrose 10% bolus 125 mL PRN    dextrose 10% bolus 250 mL PRN    fluticasone furoate-vilanteroL 100-25 mcg/dose diskus inhaler 1 puff Daily    glucagon (human recombinant) injection 1 mg PRN    glucose chewable tablet 16 g PRN    glucose chewable tablet 24 g PRN    heparin (porcine) injection 1,000 Units PRN    heparin (porcine) injection 5,000 Units Q8H    hydrALAZINE tablet 25 mg TID    insulin aspart U-100 pen 0-5 Units QID (AC + HS) PRN    isosorbide mononitrate tablet 20 mg BID    levothyroxine tablet 150 mcg Daily    melatonin tablet 6 mg Nightly PRN    mupirocin 2 % ointment BID    naloxone 0.4 mg/mL injection 0.02 mg PRN    ondansetron injection 4 mg Q8H PRN    prochlorperazine injection Soln 5 mg Q6H PRN    sodium chloride 0.9% bolus 250 mL PRN    sodium chloride 0.9% flush 10 mL PRN     Family History       Problem Relation (Age of Onset)    No Known Problems Mother, Father, Sister, Brother, Maternal Aunt, Maternal Uncle, Paternal Aunt, Paternal Uncle, Maternal Grandmother, Maternal Grandfather, Paternal Grandmother, Paternal Grandfather          Tobacco Use    Smoking status: Never Smoker    Smokeless tobacco: Never Used   Substance and Sexual Activity    Alcohol use: Not on file    Drug use: Not on file    Sexual activity: Not on file     Review of Systems   Constitutional:  Positive for activity change and  appetite change. Negative for fatigue.   Cardiovascular:  Positive for leg swelling. Negative for chest pain.   Gastrointestinal:  Negative for abdominal distention, abdominal pain, diarrhea, nausea and vomiting.   Genitourinary:  Negative for flank pain.   Objective:     Vital Signs (Most Recent):  Temp: 97.8 °F (36.6 °C) (04/15/22 0904)  Pulse: 69 (04/15/22 0904)  Resp: 18 (04/15/22 0904)  BP: (!) 169/74 (04/15/22 0904)  SpO2: 99 % (04/15/22 0904)  O2 Device (Oxygen Therapy): room air (04/15/22 0904)   Vital Signs (24h Range):  Temp:  [97.8 °F (36.6 °C)-100 °F (37.8 °C)] 97.8 °F (36.6 °C)  Pulse:  [63-69] 69  Resp:  [16-20] 18  SpO2:  [96 %-100 %] 99 %  BP: (139-203)/(62-91) 169/74     Weight: 81.6 kg (180 lb) (04/14/22 1454)  Body mass index is 29.95 kg/m².  Body surface area is 1.93 meters squared.    I/O last 3 completed shifts:  In: 541.3 [IV Piggyback:541.3]  Out: -     Physical Exam  Constitutional:       General: She is not in acute distress.  HENT:      Head: Normocephalic.      Comments: L eye orbital and periorbital edema   Cardiovascular:      Rate and Rhythm: Normal rate.   Pulmonary:      Effort: Pulmonary effort is normal. No respiratory distress.   Abdominal:      Palpations: Abdomen is soft.      Tenderness: There is no abdominal tenderness.   Musculoskeletal:      Right lower leg: No edema.      Left lower leg: No edema.   Skin:     General: Skin is warm.   Neurological:      Mental Status: She is alert and oriented to person, place, and time.   Psychiatric:         Mood and Affect: Mood normal.         Behavior: Behavior normal.       Significant Labs:  CBC:   Recent Labs   Lab 04/15/22  0238   WBC 11.88   RBC 2.17*   HGB 6.4*   HCT 21.1*      MCV 97   MCH 29.5   MCHC 30.3*     CMP:   Recent Labs   Lab 04/15/22  0238   GLU 71   CALCIUM 8.7   *   K 4.2   CO2 26   CL 95   BUN 38*   CREATININE 4.7*           Assessment/Plan:     * Endophthalmitis  Ophthalmology on board    ESRD (end  stage renal disease)  Nephrology History  iHD Schedule: MWF  Unit/MD: [facility], Dr. Felix  EDW: ?  Access: LUE AVF  Residual Renal Function: anburic    Labs reviewed.   Not overloaded on exam.   Will schedule dialysis for metabolic clearance and volume management tonight vs tomorrow.   Dialysate adjusted to current labs   Continue to monitor intake and output, daily weights   Avoid nephrotoxic medication and renal dose medications to GFR  Strict I/Os    Anemia of Chronic Kidney Disease   Hgb goal in ESRD is Hgb of 10-11.   Transfuse for Hg <7     Mineral Bone Disease in CKD   Renal diet if not NPO  Continue home phos binder   Daily RFP    Lab Results   Component Value Date    CALCIUM 8.7 04/15/2022    PHOS 4.1 04/15/2022                Thank you for your consult. I will follow-up with patient. Please contact us if you have any additional questions.    Oniel Freeman MD  Nephrology  [facility] - Intensive Care ([place])

## 2022-04-15 NOTE — ASSESSMENT & PLAN NOTE
81 y/o female with a hx of ESRD on HD MWF vis AV LUE, CAD, HF, HTN, DM2, and recent admission to OSH 4/5-4/8 for Klebsiella pneumoniae bacteremia thought to be 2/2 UTI. Culture results show pansensitive klebsiella, and the patient was discharged on PO ciprofloxacin. Now with eye swelling and vision changes for one week- seen by ophthalmology with concern for endophthalmitis - CT orbit with minimal asymmetric enhancement at the periphery of the left globe compared to the right could represent a mild inflammatory or infectious process. Due to lack of trauma and wounds to the eye, expect hematogones spread possibly from recent bacteremia, unclear if source is urinary as patient rarely makes urine and denies any urinary symptoms - kleb pnemo tend too cause liver abscess.    Recommendations:    1. Continue IV CTX 2 g Q24.  2. Obtain U/S abdomen to r/o intraabdominal source (liver abscess).  3. Plan for eviscerated vs enucleated next week per ophthalmology if able please obtain cultures (gram stain / aerobic/anerobic/fungus and AFB cultures).  4. Will follow up blood cultures.

## 2022-04-15 NOTE — ASSESSMENT & PLAN NOTE
-No complaints of chest pain, baseline EKG ordered. 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

## 2022-04-15 NOTE — ASSESSMENT & PLAN NOTE
-- Opthalmology consulted and has seen patient; plan for surgical management next week.   -- CT orbits shows minimal asymmetric enhancement at the periphery of the left globe compared to the right could represent a mild inflammatory or infectious process  - Noted to have recent Klebsiella pneumoniae bacteremia being treated with cipro, potential source. Repeat blood cultures ordered.   - Abx per ID

## 2022-04-15 NOTE — ASSESSMENT & PLAN NOTE
-Recently diagnosed based on blood cultures done at [facility] 4/5, pansensitive  -Suspect bacteremia may be cause of Endogenous bacterial endophthalmitis, repeat blood cultures ordered  -PO cipro being used as outpatient, will give IV ceftriaxone while admitted, f/u repeat blood cultures.

## 2022-04-15 NOTE — ASSESSMENT & PLAN NOTE
- patient given snacks and D10; holding diabetes medication  - patient stated that she has has several episodes of hypoglycemia since starting HD

## 2022-04-15 NOTE — HPI
81 yo F with PMHx ESRD, CAD, chronic diastolic heart failure, HTN, DM2, and HLD who presents to the ED from ophthalmology clinic for L eye pain and swelling x 1 week. Pt. Reports that she has had progressively worsening L eye pain and vision loss over the course of the last week. Pt. Reports developing floaters and slowly worsening vision loss with swelling of her upper and lower eyelids. Pt. States that she has developed worsening pain in addition to the swelling over the last couple of days which prompted her to schedule an appointment with optometry. Pt. Seen in optometry clinic and was referred to ophthalmology clinic today over concerns for endophthalmitis. She was then sent to the ED for CT orbits and admit for likely need for enucleation and evisceration due to the severity of the disease. Of note, patient was recently admitted to [facility] 4/5-/48 for Klebsiella pneumoniae bacteremia thought to be 2/2 UTI. Culture results show pansensitive klebsiella, and the patient was discharged on PO ciprofloxacin and reports compliance with the regimen. Pt. Denies any fevers, chills, eye discharge, or redness.

## 2022-04-15 NOTE — ASSESSMENT & PLAN NOTE
Nephrology History  iHD Schedule: MWF  Unit/MD: [facility] Garvin, Dr. Felix  EDW: ?  Access: LUE AVF  Residual Renal Function: anburic    Labs reviewed.   Not overloaded on exam.   Will schedule dialysis for metabolic clearance and volume management tonight vs tomorrow.   Dialysate adjusted to current labs   Continue to monitor intake and output, daily weights   Avoid nephrotoxic medication and renal dose medications to GFR  Strict I/Os    Anemia of Chronic Kidney Disease   Hgb goal in ESRD is Hgb of 10-11.   Transfuse for Hg <7     Mineral Bone Disease in CKD   Renal diet if not NPO  Continue home phos binder   Daily RFP    Lab Results   Component Value Date    CALCIUM 8.7 04/15/2022    PHOS 4.1 04/15/2022

## 2022-04-15 NOTE — ASSESSMENT & PLAN NOTE
-Recently diagnosed based on blood cultures done at [facility] 4/5, pansensitive  -Suspect bacteremia may be cause of Endogenous bacterial endophthalmitis, repeat blood cultures ordered  - Consult ID

## 2022-04-15 NOTE — ASSESSMENT & PLAN NOTE
-Pt. With progressively worsening vision loss and eye pain, swollen eyelids and hypopyon  -CT orbits shows minimal asymmetric enhancement at the periphery of the left globe compared to the right could represent a mild inflammatory or infectious process  -Pt. Noted to have recent Klebsiella pneumoniae bacteremia being treated with cipro, potential source. Repeat blood cultures ordered. IV vanc and ceftriaxone started for broad spectrum therapy in ED, will continue with ceftriaxone only for treatment of klebsiella; consider ID consult for further asssitance. Ophthalmology consulted and plan for enucleation and evisceration due to severity of disease.

## 2022-04-16 PROBLEM — M86.271 SUBACUTE OSTEOMYELITIS OF RIGHT FOOT: Status: ACTIVE | Noted: 2022-04-16

## 2022-04-16 PROBLEM — E03.8 SECONDARY HYPOTHYROIDISM: Status: ACTIVE | Noted: 2022-04-16

## 2022-04-16 PROBLEM — Z99.2 ANEMIA DUE TO CHRONIC KIDNEY DISEASE, ON CHRONIC DIALYSIS: Status: RESOLVED | Noted: 2022-04-15 | Resolved: 2022-04-16

## 2022-04-16 PROBLEM — D63.1 ANEMIA DUE TO CHRONIC KIDNEY DISEASE, ON CHRONIC DIALYSIS: Status: RESOLVED | Noted: 2022-04-15 | Resolved: 2022-04-16

## 2022-04-16 PROBLEM — N18.6 ANEMIA DUE TO CHRONIC KIDNEY DISEASE, ON CHRONIC DIALYSIS: Status: RESOLVED | Noted: 2022-04-15 | Resolved: 2022-04-16

## 2022-04-16 PROBLEM — K92.2 GASTROINTESTINAL BLEEDING: Status: ACTIVE | Noted: 2022-04-16

## 2022-04-16 LAB
ANION GAP SERPL CALC-SCNC: 15 MMOL/L (ref 8–16)
APTT BLDCRRT: 26.1 SEC (ref 21–32)
B-OH-BUTYR BLD STRIP-SCNC: 0 MMOL/L (ref 0–0.5)
BASOPHILS # BLD AUTO: 0.02 K/UL (ref 0–0.2)
BASOPHILS NFR BLD: 0.1 % (ref 0–1.9)
BLD PROD TYP BPU: NORMAL
BLD PROD TYP BPU: NORMAL
BLOOD UNIT EXPIRATION DATE: NORMAL
BLOOD UNIT EXPIRATION DATE: NORMAL
BLOOD UNIT TYPE CODE: 5100
BLOOD UNIT TYPE CODE: 5100
BLOOD UNIT TYPE: NORMAL
BLOOD UNIT TYPE: NORMAL
BUN SERPL-MCNC: 50 MG/DL (ref 8–23)
C PEPTIDE SERPL-MCNC: 14.27 NG/ML (ref 0.78–5.19)
CALCIUM SERPL-MCNC: 8.1 MG/DL (ref 8.7–10.5)
CHLORIDE SERPL-SCNC: 94 MMOL/L (ref 95–110)
CO2 SERPL-SCNC: 25 MMOL/L (ref 23–29)
CODING SYSTEM: NORMAL
CODING SYSTEM: NORMAL
CREAT SERPL-MCNC: 6.1 MG/DL (ref 0.5–1.4)
DIFFERENTIAL METHOD: ABNORMAL
DISPENSE STATUS: NORMAL
DISPENSE STATUS: NORMAL
EOSINOPHIL # BLD AUTO: 0.1 K/UL (ref 0–0.5)
EOSINOPHIL NFR BLD: 0.7 % (ref 0–8)
ERYTHROCYTE [DISTWIDTH] IN BLOOD BY AUTOMATED COUNT: 14.5 % (ref 11.5–14.5)
EST. GFR  (AFRICAN AMERICAN): 6.8 ML/MIN/1.73 M^2
EST. GFR  (NON AFRICAN AMERICAN): 5.9 ML/MIN/1.73 M^2
GLUCOSE SERPL-MCNC: 122 MG/DL (ref 70–110)
GLUCOSE SERPL-MCNC: 86 MG/DL (ref 70–110)
HCT VFR BLD AUTO: 24.2 % (ref 37–48.5)
HGB BLD-MCNC: 7.2 G/DL (ref 12–16)
IMM GRANULOCYTES # BLD AUTO: 0.12 K/UL (ref 0–0.04)
IMM GRANULOCYTES NFR BLD AUTO: 0.8 % (ref 0–0.5)
INR PPP: 1.1 (ref 0.8–1.2)
INSULIN COLLECTION INTERVAL: ABNORMAL
INSULIN SERPL-ACNC: 30.1 UU/ML
LYMPHOCYTES # BLD AUTO: 0.8 K/UL (ref 1–4.8)
LYMPHOCYTES NFR BLD: 4.9 % (ref 18–48)
MCH RBC QN AUTO: 28.5 PG (ref 27–31)
MCHC RBC AUTO-ENTMCNC: 29.8 G/DL (ref 32–36)
MCV RBC AUTO: 96 FL (ref 82–98)
MONOCYTES # BLD AUTO: 0.8 K/UL (ref 0.3–1)
MONOCYTES NFR BLD: 4.9 % (ref 4–15)
NEUTROPHILS # BLD AUTO: 13.5 K/UL (ref 1.8–7.7)
NEUTROPHILS NFR BLD: 88.6 % (ref 38–73)
NRBC BLD-RTO: 0 /100 WBC
PLATELET # BLD AUTO: 276 K/UL (ref 150–450)
PMV BLD AUTO: 8.9 FL (ref 9.2–12.9)
POCT GLUCOSE: 123 MG/DL (ref 70–110)
POCT GLUCOSE: 127 MG/DL (ref 70–110)
POCT GLUCOSE: 131 MG/DL (ref 70–110)
POCT GLUCOSE: 133 MG/DL (ref 70–110)
POCT GLUCOSE: 139 MG/DL (ref 70–110)
POCT GLUCOSE: 140 MG/DL (ref 70–110)
POCT GLUCOSE: 29 MG/DL (ref 70–110)
POCT GLUCOSE: 31 MG/DL (ref 70–110)
POCT GLUCOSE: 68 MG/DL (ref 70–110)
POCT GLUCOSE: 71 MG/DL (ref 70–110)
POCT GLUCOSE: 84 MG/DL (ref 70–110)
POCT GLUCOSE: 93 MG/DL (ref 70–110)
POTASSIUM SERPL-SCNC: 4.1 MMOL/L (ref 3.5–5.1)
PROTHROMBIN TIME: 11.9 SEC (ref 9–12.5)
RBC # BLD AUTO: 2.53 M/UL (ref 4–5.4)
SODIUM SERPL-SCNC: 134 MMOL/L (ref 136–145)
TRANS ERYTHROCYTES VOL PATIENT: NORMAL ML
TRANS ERYTHROCYTES VOL PATIENT: NORMAL ML
WBC # BLD AUTO: 15.22 K/UL (ref 3.9–12.7)

## 2022-04-16 PROCEDURE — 99233 SBSQ HOSP IP/OBS HIGH 50: CPT | Mod: ,,, | Performed by: HOSPITALIST

## 2022-04-16 PROCEDURE — 85610 PROTHROMBIN TIME: CPT | Performed by: HOSPITALIST

## 2022-04-16 PROCEDURE — 99233 SBSQ HOSP IP/OBS HIGH 50: CPT | Mod: ,,, | Performed by: INTERNAL MEDICINE

## 2022-04-16 PROCEDURE — 63600175 PHARM REV CODE 636 W HCPCS: Performed by: INTERNAL MEDICINE

## 2022-04-16 PROCEDURE — 99222 1ST HOSP IP/OBS MODERATE 55: CPT | Mod: GC,,, | Performed by: INTERNAL MEDICINE

## 2022-04-16 PROCEDURE — 99222 PR INITIAL HOSPITAL CARE,LEVL II: ICD-10-PCS | Mod: GC,,, | Performed by: INTERNAL MEDICINE

## 2022-04-16 PROCEDURE — 85025 COMPLETE CBC W/AUTO DIFF WBC: CPT | Performed by: HOSPITALIST

## 2022-04-16 PROCEDURE — 25000003 PHARM REV CODE 250: Performed by: HOSPITALIST

## 2022-04-16 PROCEDURE — 87070 CULTURE OTHR SPECIMN AEROBIC: CPT

## 2022-04-16 PROCEDURE — 99233 PR SUBSEQUENT HOSPITAL CARE,LEVL III: ICD-10-PCS | Mod: ,,, | Performed by: NURSE PRACTITIONER

## 2022-04-16 PROCEDURE — 99223 PR INITIAL HOSPITAL CARE,LEVL III: ICD-10-PCS | Mod: 25,,, | Performed by: PODIATRIST

## 2022-04-16 PROCEDURE — 85730 THROMBOPLASTIN TIME PARTIAL: CPT | Performed by: HOSPITALIST

## 2022-04-16 PROCEDURE — 36410 VNPNXR 3YR/> PHY/QHP DX/THER: CPT

## 2022-04-16 PROCEDURE — 94640 AIRWAY INHALATION TREATMENT: CPT

## 2022-04-16 PROCEDURE — P9021 RED BLOOD CELLS UNIT: HCPCS | Performed by: HOSPITALIST

## 2022-04-16 PROCEDURE — C1751 CATH, INF, PER/CENT/MIDLINE: HCPCS

## 2022-04-16 PROCEDURE — 87075 CULTR BACTERIA EXCEPT BLOOD: CPT

## 2022-04-16 PROCEDURE — 27000221 HC OXYGEN, UP TO 24 HOURS

## 2022-04-16 PROCEDURE — 99233 PR SUBSEQUENT HOSPITAL CARE,LEVL III: ICD-10-PCS | Mod: ,,, | Performed by: HOSPITALIST

## 2022-04-16 PROCEDURE — 99233 SBSQ HOSP IP/OBS HIGH 50: CPT | Mod: ,,, | Performed by: NURSE PRACTITIONER

## 2022-04-16 PROCEDURE — 36430 TRANSFUSION BLD/BLD COMPNT: CPT

## 2022-04-16 PROCEDURE — C9113 INJ PANTOPRAZOLE SODIUM, VIA: HCPCS | Performed by: HOSPITALIST

## 2022-04-16 PROCEDURE — 11044 WOUND DEBRIDEMENT: ICD-10-PCS | Mod: ,,, | Performed by: PODIATRIST

## 2022-04-16 PROCEDURE — 80048 BASIC METABOLIC PNL TOTAL CA: CPT | Performed by: HOSPITALIST

## 2022-04-16 PROCEDURE — 11044 DBRDMT BONE 1ST 20 SQ CM/<: CPT | Mod: ,,, | Performed by: PODIATRIST

## 2022-04-16 PROCEDURE — 63600175 PHARM REV CODE 636 W HCPCS: Performed by: HOSPITALIST

## 2022-04-16 PROCEDURE — 99900035 HC TECH TIME PER 15 MIN (STAT)

## 2022-04-16 PROCEDURE — 63600175 PHARM REV CODE 636 W HCPCS: Mod: JG

## 2022-04-16 PROCEDURE — 99233 PR SUBSEQUENT HOSPITAL CARE,LEVL III: ICD-10-PCS | Mod: ,,, | Performed by: INTERNAL MEDICINE

## 2022-04-16 PROCEDURE — 20600001 HC STEP DOWN PRIVATE ROOM

## 2022-04-16 PROCEDURE — 63600175 PHARM REV CODE 636 W HCPCS: Mod: JB | Performed by: HOSPITALIST

## 2022-04-16 PROCEDURE — 94761 N-INVAS EAR/PLS OXIMETRY MLT: CPT

## 2022-04-16 PROCEDURE — 99223 1ST HOSP IP/OBS HIGH 75: CPT | Mod: 25,,, | Performed by: PODIATRIST

## 2022-04-16 PROCEDURE — 80100014 HC HEMODIALYSIS 1:1

## 2022-04-16 RX ORDER — GLUCAGON 1 MG
KIT INJECTION
Status: COMPLETED
Start: 2022-04-16 | End: 2022-04-16

## 2022-04-16 RX ORDER — HYDROCODONE BITARTRATE AND ACETAMINOPHEN 500; 5 MG/1; MG/1
TABLET ORAL
Status: DISCONTINUED | OUTPATIENT
Start: 2022-04-16 | End: 2022-04-22 | Stop reason: HOSPADM

## 2022-04-16 RX ORDER — SODIUM CHLORIDE 9 MG/ML
INJECTION, SOLUTION INTRAVENOUS ONCE
Status: DISCONTINUED | OUTPATIENT
Start: 2022-04-16 | End: 2022-04-18

## 2022-04-16 RX ORDER — OCTREOTIDE ACETATE 50 UG/ML
50 INJECTION, SOLUTION INTRAVENOUS; SUBCUTANEOUS EVERY 8 HOURS
Status: COMPLETED | OUTPATIENT
Start: 2022-04-16 | End: 2022-04-17

## 2022-04-16 RX ORDER — OXYCODONE HYDROCHLORIDE 5 MG/1
5 TABLET ORAL EVERY 6 HOURS PRN
Status: DISCONTINUED | OUTPATIENT
Start: 2022-04-16 | End: 2022-04-16

## 2022-04-16 RX ORDER — PANTOPRAZOLE SODIUM 40 MG/10ML
40 INJECTION, POWDER, LYOPHILIZED, FOR SOLUTION INTRAVENOUS EVERY 12 HOURS
Status: DISCONTINUED | OUTPATIENT
Start: 2022-04-16 | End: 2022-04-22 | Stop reason: HOSPADM

## 2022-04-16 RX ORDER — OCTREOTIDE ACETATE 50 UG/ML
50 INJECTION, SOLUTION INTRAVENOUS; SUBCUTANEOUS EVERY 8 HOURS
Status: DISCONTINUED | OUTPATIENT
Start: 2022-04-16 | End: 2022-04-16

## 2022-04-16 RX ORDER — OXYCODONE HYDROCHLORIDE 10 MG/1
10 TABLET ORAL EVERY 6 HOURS PRN
Status: DISCONTINUED | OUTPATIENT
Start: 2022-04-16 | End: 2022-04-22 | Stop reason: HOSPADM

## 2022-04-16 RX ORDER — POLYETHYLENE GLYCOL 3350 17 G/17G
17 POWDER, FOR SOLUTION ORAL DAILY
Status: DISCONTINUED | OUTPATIENT
Start: 2022-04-16 | End: 2022-04-22 | Stop reason: HOSPADM

## 2022-04-16 RX ORDER — GLUCAGON 1 MG
1 KIT INJECTION ONCE
Status: COMPLETED | OUTPATIENT
Start: 2022-04-16 | End: 2022-04-16

## 2022-04-16 RX ORDER — OXYCODONE HYDROCHLORIDE 5 MG/1
5 TABLET ORAL EVERY 6 HOURS PRN
Status: DISCONTINUED | OUTPATIENT
Start: 2022-04-16 | End: 2022-04-22 | Stop reason: HOSPADM

## 2022-04-16 RX ADMIN — GLUCAGON HYDROCHLORIDE 1 MG: KIT at 02:04

## 2022-04-16 RX ADMIN — LEVOTHYROXINE SODIUM 150 MCG: 150 TABLET ORAL at 10:04

## 2022-04-16 RX ADMIN — HYDRALAZINE HYDROCHLORIDE 25 MG: 25 TABLET, FILM COATED ORAL at 05:04

## 2022-04-16 RX ADMIN — CEFTRIAXONE 2 G: 2 INJECTION, SOLUTION INTRAVENOUS at 05:04

## 2022-04-16 RX ADMIN — MUPIROCIN: 20 OINTMENT TOPICAL at 10:04

## 2022-04-16 RX ADMIN — HYDRALAZINE HYDROCHLORIDE 25 MG: 25 TABLET, FILM COATED ORAL at 09:04

## 2022-04-16 RX ADMIN — PANTOPRAZOLE SODIUM 40 MG: 40 INJECTION, POWDER, FOR SOLUTION INTRAVENOUS at 09:04

## 2022-04-16 RX ADMIN — DEXTROSE 250 ML: 10 SOLUTION INTRAVENOUS at 08:04

## 2022-04-16 RX ADMIN — ACETAMINOPHEN 650 MG: 325 TABLET ORAL at 11:04

## 2022-04-16 RX ADMIN — OCTREOTIDE ACETATE 50 MCG: 50 INJECTION, SOLUTION INTRAVENOUS; SUBCUTANEOUS at 09:04

## 2022-04-16 RX ADMIN — OXYCODONE 5 MG: 5 TABLET ORAL at 02:04

## 2022-04-16 RX ADMIN — OXYCODONE HYDROCHLORIDE 10 MG: 10 TABLET ORAL at 05:04

## 2022-04-16 RX ADMIN — ISOSORBIDE MONONITRATE 20 MG: 20 TABLET ORAL at 09:04

## 2022-04-16 RX ADMIN — POLYETHYLENE GLYCOL 3350 17 G: 17 POWDER, FOR SOLUTION ORAL at 02:04

## 2022-04-16 RX ADMIN — FLUTICASONE FUROATE AND VILANTEROL TRIFENATATE 1 PUFF: 100; 25 POWDER RESPIRATORY (INHALATION) at 07:04

## 2022-04-16 RX ADMIN — GLUCAGON 1 MG: KIT at 02:04

## 2022-04-16 RX ADMIN — OCTREOTIDE ACETATE 50 MCG: 50 INJECTION, SOLUTION INTRAVENOUS; SUBCUTANEOUS at 11:04

## 2022-04-16 RX ADMIN — DEXTROSE: 10 SOLUTION INTRAVENOUS at 10:04

## 2022-04-16 RX ADMIN — PANTOPRAZOLE SODIUM 40 MG: 40 INJECTION, POWDER, FOR SOLUTION INTRAVENOUS at 10:04

## 2022-04-16 RX ADMIN — HEPARIN SODIUM 5000 UNITS: 5000 INJECTION INTRAVENOUS; SUBCUTANEOUS at 05:04

## 2022-04-16 NOTE — PROGRESS NOTES
[facility] ([address] Medicine  Progress Note    Patient Name: Erika Perera  MRN: [id number]  Patient Class: IP- Inpatient   Admission Date: 4/14/2022  Length of Stay: 2 days  Attending Physician: Ila Garcia MD  Primary Care Provider: Minor Bennett MD        Subjective:     Principal Problem:Endophthalmitis        HPI:  81 yo F with PMHx ESRD, CAD, chronic diastolic heart failure, HTN, DM2, and HLD who presents to the ED from ophthalmology clinic for L eye pain and swelling x 1 week. Pt. Reports that she has had progressively worsening L eye pain and vision loss over the course of the last week. Pt. Reports developing floaters and slowly worsening vision loss with swelling of her upper and lower eyelids. Pt. States that she has developed worsening pain in addition to the swelling over the last couple of days which prompted her to schedule an appointment with optometry. Pt. Seen in optometry clinic and was referred to ophthalmology clinic today over concerns for endophthalmitis. She was then sent to the ED for CT orbits and admit for likely need for enucleation and evisceration due to the severity of the disease. Of note, patient was recently admitted to [facility] 4/5-/48 for Klebsiella pneumoniae bacteremia thought to be 2/2 UTI. Culture results show pansensitive klebsiella, and the patient was discharged on PO ciprofloxacin and reports compliance with the regimen. Pt. Denies any fevers, chills, eye discharge, or redness.      Overview/Hospital Course:  Patient admitted to hospital service.  Ophthalmology, ID, nephrology consulted.  Started on ceftriaxone. Blood cx collected.  CT scan demonstrated bilateral exophthalmos and inflammatory changes involving left globe.  During admission, patient noted hypoglycemic.  She has had issues at home with the same.  She takes sulfonylurea at home and is a HD patient.  Started on D10gtt, SQ octreotide, glucagon, regular renal diet,  q2hr accuchecks.  Also noted to be anemic on admission thought to be secondary to ESRD and acute infection.  Nurse noted maroon stools.  Patient received 1U PRBC on HD#1 and is scheduled to receive 2 more.  GI consulted. Placed on IV PPI with serial Hb.  Patient noted to have new right first toe ulcer.  Xray confirmed osteomyelitis.  Podiatry consulted.       Interval History: Patient continued to have hypoglycemia overnight.  Octreotide was added this AM.  She continues to have bloody BM.  Per family, she take ticagrelor which was recently started. She admits to left eye pain today and HA.  No appetite but denies n/v.     Review of Systems   Constitutional:  Positive for activity change. Negative for fever.   Eyes:  Positive for pain and visual disturbance.   Respiratory:  Negative for shortness of breath.    Cardiovascular:  Positive for leg swelling. Negative for chest pain.   Gastrointestinal:  Positive for constipation. Negative for abdominal pain, nausea and vomiting.   Neurological:  Positive for headaches. Negative for dizziness.   Objective:     Vital Signs (Most Recent):  Temp: 98.3 °F (36.8 °C) (04/16/22 1355)  Pulse: (!) 57 (04/16/22 1355)  Resp: 18 (04/16/22 1406)  BP: (!) 155/67 (04/16/22 1355)  SpO2: 97 % (04/16/22 1355)   Vital Signs (24h Range):  Temp:  [97.2 °F (36.2 °C)-99.7 °F (37.6 °C)] 98.3 °F (36.8 °C)  Pulse:  [48-60] 57  Resp:  [12-21] 18  SpO2:  [90 %-100 %] 97 %  BP: (125-163)/(60-90) 155/67     Weight: 81.6 kg (180 lb)  Body mass index is 29.95 kg/m².    Intake/Output Summary (Last 24 hours) at 4/16/2022 1417  Last data filed at 4/16/2022 1355  Gross per 24 hour   Intake 1695.16 ml   Output --   Net 1695.16 ml      Physical Exam  Vitals and nursing note reviewed.   Constitutional:       Appearance: Normal appearance. She is not ill-appearing.   HENT:      Head: Normocephalic and atraumatic.      Nose: Nose normal.      Mouth/Throat:      Mouth: Mucous membranes are moist.      Pharynx:  Oropharynx is clear.   Eyes:      General:         Left eye: Discharge present.     Comments: Bilateral exophthalmus, left ptosis, + left hypopyon    Cardiovascular:      Rate and Rhythm: Normal rate and regular rhythm.      Heart sounds: No murmur heard.  Pulmonary:      Effort: Pulmonary effort is normal. No respiratory distress.      Breath sounds: Normal breath sounds.   Abdominal:      General: Bowel sounds are normal. There is no distension.      Palpations: Abdomen is soft.   Musculoskeletal:         General: No deformity. Normal range of motion.      Right lower leg: Edema present.      Left lower leg: Edema present.   Skin:     General: Skin is warm and dry.   Neurological:      General: No focal deficit present.      Mental Status: She is oriented to person, place, and time.   Psychiatric:         Mood and Affect: Mood normal.         Behavior: Behavior normal.       Significant Labs: All pertinent labs within the past 24 hours have been reviewed.    Significant Imaging: I have reviewed all pertinent imaging results/findings within the past 24 hours.      Assessment/Plan:      * Endophthalmitis  -- Opthalmology consulted and has seen patient; plan for surgical management next week.   -- CT orbits shows minimal asymmetric enhancement at the periphery of the left globe compared to the right could represent a mild inflammatory or infectious process  - Noted to have recent Klebsiella pneumoniae bacteremia being treated with cipro, potential source. Repeat blood cultures ordered.   - Abx per ID; currently receiving ceftriaxone     Hypoglycemia  - secondary to sulfonylurea and active infection   - SQ octreotide, D10, Glucagon, regular diet    - transferring to higher level of care for q2hr accuchecks     Gastrointestinal bleeding  - IV PPI bid  - serial Hb/Hct  - transfuse total 3U PRBC  - stop ASA and prophylactic heparin   - GI consulted     Subacute osteomyelitis of right foot  - patient went to nail salon  for pedicure two weeks ago and developed ulcer right first toe  - XRAY demonstrates evidence of osteomyelitis   - Continue abx coverage  - consult podiatry for wound care and possible amputation when patient stable       ESRD (end stage renal disease)  - No acue issues or need for urgent HD, nephrology consulted for regular HD while admitted      DM2 (diabetes mellitus, type 2)  -A1c ordered and pending  - Hold diabetes medication secondary to hypoglycemia  - discontinue glimepiride indefinitely       Bacteremia due to Klebsiella pneumoniae  -Recently diagnosed based on blood cultures done at [facility] 4/5, pansensitive  -Suspect bacteremia may be cause of Endogenous bacterial endophthalmitis, repeat blood cultures ordered  - Consult ID        HTN (hypertension)  -Continue home coreg, hydralazine, and isosorbide    CAD (coronary artery disease)  -No complaints of chest pain, baseline EKG ordered. TTE ordered to rule out vegetetation in setting of bacteremia with concern for endogenous bacterial endophthalmitis  -Continue home statin        VTE Risk Mitigation (From admission, onward)         Ordered     heparin (porcine) injection 1,000 Units  As needed (PRN)         04/15/22 0757     IP VTE HIGH RISK PATIENT  Once         04/14/22 1942     Place sequential compression device  Until discontinued         04/14/22 1942                Discharge Planning   ROM: 4/19/2022     Code Status: Full Code   Is the patient medically ready for discharge?: No    Reason for patient still in hospital (select all that apply): Treatment                     Ila Garcia MD  Department of Hospital Medicine   [facility] - Intensive Care ([place]-16)

## 2022-04-16 NOTE — ASSESSMENT & PLAN NOTE
- secondary to sulfonylurea and active infection   - SQ octreotide, D10, Glucagon, regular diet    - transferring to higher level of care for q2hr accuchecks

## 2022-04-16 NOTE — ASSESSMENT & PLAN NOTE
-- Opthalmology consulted and has seen patient; plan for surgical management next week.   -- CT orbits shows minimal asymmetric enhancement at the periphery of the left globe compared to the right could represent a mild inflammatory or infectious process  - Noted to have recent Klebsiella pneumoniae bacteremia being treated with cipro, potential source. Repeat blood cultures ordered.   - Abx per ID; currently receiving ceftriaxone

## 2022-04-16 NOTE — SUBJECTIVE & OBJECTIVE
Interval History:     WBC count elevated this morning.    Review of Systems   Constitutional:  Positive for activity change. Negative for appetite change, chills and fever.   HENT:  Negative for congestion.    Eyes:  Positive for pain and visual disturbance (left eye). Negative for itching.   Respiratory:  Negative for cough, chest tightness and shortness of breath.    Cardiovascular:  Negative for palpitations and leg swelling.   Gastrointestinal:  Negative for abdominal pain and nausea.   Endocrine: Negative for polyphagia and polyuria.   Genitourinary:  Negative for dysuria and frequency.   Musculoskeletal:  Negative for back pain.   Neurological:  Negative for numbness and headaches.   Psychiatric/Behavioral:  Negative for agitation and behavioral problems.    Objective:     Vital Signs (Most Recent):  Temp: 97.5 °F (36.4 °C) (04/16/22 0434)  Pulse: (!) 48 (04/16/22 0712)  Resp: 12 (04/16/22 0712)  BP: 138/64 (04/16/22 0434)  SpO2: 100 % (04/16/22 0712)   Vital Signs (24h Range):  Temp:  [97.2 °F (36.2 °C)-99.7 °F (37.6 °C)] 97.5 °F (36.4 °C)  Pulse:  [48-62] 48  Resp:  [12-20] 12  SpO2:  [94 %-100 %] 100 %  BP: (125-163)/(60-94) 138/64     Weight: 81.6 kg (180 lb)  Body mass index is 29.95 kg/m².    Estimated Creatinine Clearance: 7.5 mL/min (A) (based on SCr of 6.1 mg/dL (H)).    Physical Exam  Constitutional:       Appearance: She is well-developed.   HENT:      Head: Normocephalic.   Eyes:      General:         Left eye: Discharge present.     Conjunctiva/sclera:      Left eye: Chemosis present.   Cardiovascular:      Rate and Rhythm: Normal rate and regular rhythm.      Heart sounds: Normal heart sounds. No murmur heard.  Pulmonary:      Effort: Pulmonary effort is normal.      Breath sounds: Normal breath sounds.   Abdominal:      General: Bowel sounds are normal. There is no distension.      Palpations: Abdomen is soft.      Tenderness: There is no abdominal tenderness.   Musculoskeletal:         General:  Normal range of motion.   Neurological:      Mental Status: She is alert and oriented to person, place, and time.   Psychiatric:         Behavior: Behavior normal.       Significant Labs:   Microbiology Results (last 7 days)       Procedure Component Value Units Date/Time    Blood culture #1 **CANNOT BE ORDERED STAT** [[id number]] Collected: 04/14/22 1728    Order Status: Completed Specimen: Blood from Peripheral, Forearm, Right Updated: 04/15/22 2012     Blood Culture, Routine No Growth to date      No Growth to date    Blood culture #2 **CANNOT BE ORDERED STAT** [[id number]] Collected: 04/14/22 1729    Order Status: Completed Specimen: Blood from Peripheral, Forearm, Right Updated: 04/15/22 2012     Blood Culture, Routine No Growth to date      No Growth to date            Significant Imaging: I have reviewed all pertinent imaging results/findings within the past 24 hours.

## 2022-04-16 NOTE — CONSULTS
[facility]  Gastroenterology  Consult Note    Patient Name: Erika Perera  MRN: [id number]  Admission Date: 4/14/2022  Hospital Length of Stay: 2 days  Code Status: Full Code   Attending Provider: Ila Garcia MD   Consulting Provider: Myrna Day MD  Primary Care Physician: Minor Bennett MD  Principal Problem:Endophthalmitis    Inpatient consult to Gastroenterology  Consult performed by: Myrna Day MD  Consult ordered by: Ila Garcia MD        Subjective:     HPI: Erika Perera is a 82 y.o. female with history of ESRD on dialysis, heart failure, CAD who presents to the hospital for endophthalmitis.  GI consulted for reports of maroon-colored stools.  The patient is planned for eye surgery next week.  Reportedly she was noted to have maroon-colored stools this morning.  She has never had a colonoscopy.  Denies any abdominal pain, nausea, vomiting or seeing blood herself.  She reports having regular bowel movements on a stool softener that was prescribed by her PCP.  Prior to that she had issues with constipation.  On admission her Hgb was 7.7, yesterday evening was 6.4 and she received 1 unit of blood, responded appropriately with Hgb 7.2 today.      Past Medical History:   Diagnosis Date    DM2 (diabetes mellitus, type 2) 4/14/2022    HTN (hypertension) 4/14/2022       Past Surgical History:   Procedure Laterality Date    CATARACT EXTRACTION Bilateral        Family History   Problem Relation Age of Onset    No Known Problems Mother     No Known Problems Father     No Known Problems Sister     No Known Problems Brother     No Known Problems Maternal Aunt     No Known Problems Maternal Uncle     No Known Problems Paternal Aunt     No Known Problems Paternal Uncle     No Known Problems Maternal Grandmother     No Known Problems Maternal Grandfather     No Known Problems Paternal Grandmother     No Known Problems Paternal Grandfather      Amblyopia Neg Hx     Blindness Neg Hx     Cancer Neg Hx     Cataracts Neg Hx     Diabetes Neg Hx     Glaucoma Neg Hx     Hypertension Neg Hx     Macular degeneration Neg Hx     Retinal detachment Neg Hx     Strabismus Neg Hx     Stroke Neg Hx     Thyroid disease Neg Hx        Social History     Socioeconomic History    Marital status: Single   Tobacco Use    Smoking status: Never Smoker    Smokeless tobacco: Never Used       No current facility-administered medications on file prior to encounter.     Current Outpatient Medications on File Prior to Encounter   Medication Sig Dispense Refill    albuterol (PROVENTIL/VENTOLIN HFA) 90 mcg/actuation inhaler Inhale 2 puffs by mouth every 4  to 6 hours as needed      aspirin (ECOTRIN) 81 MG EC tablet Take 81 mg by mouth once daily.      blood sugar diagnostic Strp by Misc.(Non-Drug; Combo Route) route      budesonide-formoterol 160-4.5 mcg (SYMBICORT) 160-4.5 mcg/actuation HFAA Inhale 2 puffs into the lungs every 12 (twelve) hours. Controller      calcitRIOL (ROCALTROL) 0.25 MCG Cap Take 1 capsule by mouth once daily.      carvediloL (COREG) 3.125 MG tablet Take 3.125 mg by mouth 2 (two) times daily with meals.      CIPRO 250 mg tablet Take 250 mg by mouth once daily.      glimepiride (AMARYL) 4 MG tablet Take 1 tablet by mouth every morning.      hydrALAZINE (APRESOLINE) 25 MG tablet Take 25 mg by mouth 3 (three) times daily.      isosorbide mononitrate (ISMO,MONOKET) 20 MG Tab Take 1 tablet by mouth 2 (two) times daily.      levothyroxine (SYNTHROID) 150 MCG tablet Take 1 tablet by mouth 30 minutes before breakfast      montelukast (SINGULAIR) 10 mg tablet Take 1 tablet by mouth nightly.         Review of patient's allergies indicates:   Allergen Reactions    Amlodipine Other (See Comments)    Atorvastatin Other (See Comments)    Nebivolol Other (See Comments)       Review of Systems   Constitutional: Negative.    HENT: Negative.    Eyes:  Positive for blurred vision and discharge.   Respiratory: Negative.    Cardiovascular: Negative.    Gastrointestinal: Negative for abdominal pain, blood in stool, constipation, diarrhea, melena, nausea and vomiting.   Genitourinary: Negative.    Musculoskeletal: Negative.    Neurological: Negative.    Psychiatric/Behavioral: Negative.         Objective:     Vitals:    04/16/22 1234   BP: (!) 147/67   Pulse: (!) 54   Resp: 18   Temp: 99.3 °F (37.4 °C)         Constitutional:  not in acute distress and well developed  HENT: Head: Normal, normocephalic, atraumatic.  Eyes: swollen left eye, chemosis, proptosis  Cardiovascular: regular rate and rhythm and no murmur  Respiratory: normal chest expansion & respiratory effort   and no accessory muscle use  GI: soft, non-tender, without masses or organomegaly  Musculoskeletal: no muscular tenderness noted  Skin: normal color  Neurological: alert, oriented x3  Psychiatric: mood and affect are within normal limits, pt is a good historian; no memory problems were noted  Rectal: brown stool on exam    Significant Labs:  Recent Labs   Lab 04/14/22  1625 04/15/22  0238 04/16/22  0357   HGB 7.7* 6.4* 7.2*       Lab Results   Component Value Date    WBC 15.22 (H) 04/16/2022    HGB 7.2 (L) 04/16/2022    HCT 24.2 (L) 04/16/2022    MCV 96 04/16/2022     04/16/2022       Lab Results   Component Value Date     (L) 04/16/2022    K 4.1 04/16/2022    CL 94 (L) 04/16/2022    CO2 25 04/16/2022    BUN 50 (H) 04/16/2022    CREATININE 6.1 (H) 04/16/2022    CALCIUM 8.1 (L) 04/16/2022    ANIONGAP 15 04/16/2022    ESTGFRAFRICA 6.8 (A) 04/16/2022    EGFRNONAA 5.9 (A) 04/16/2022       No results found for: ALT, AST, GGT, ALKPHOS, BILITOT    Lab Results   Component Value Date    INR 1.1 04/16/2022       Significant Imaging:  Reviewed pertinent radiology findings.       Assessment/Plan:     Erika Perera is a 82 y.o. female with history of ESRD on dialysis, heart failure, CAD who  presents to the hospital for endophthalmitis.  GI consulted for reports of maroon-colored stools.    Problem List:  1. Hematochezia  2. Anemia  3. Endophthalmitis  4. ESRD    GI consulted for reports of maroon-colored stool, however on rectal exam the stool is brown.  The patient has small hemorrhoids which was likely cause of bleeding.  Anemia is unlikely to be secondary to GI bleeding, most likely multifactorial in the setting of ESRD, infection and phlebotomy.  Would recommend a regular bowel regimen to avoid constipation.  Since the patient has never had a colonoscopy, will plan for outpatient follow-up and outpatient colonoscopy based on her state of health at the time.    Recommendations:  - no blood on rectal exam  - bowel regimen to avoid constipation  - outpatient colonoscopy based on the patient's state of health at the time    Thank you for involving us in the care of Erika Perera. Please call with any additional questions, concerns or changes in the patient's clinical status. We will sign off.    Myrna Day MD  Gastroenterology Fellow PGY V  [facility]

## 2022-04-16 NOTE — PROGRESS NOTES
Bedside hd initiated, cannulate upper left arm av graft with 15 gauge needles . Good blood flow, bfr 400/. uft net gaol set for 2 liters as tolerated., B/P 166/70, pulse 63, o2 sat on 1 liters nasal cannula 100%. . Patient alert aaand stable

## 2022-04-16 NOTE — PROGRESS NOTES
[facility] ([place] [address])  Nephrology  Progress Note    Patient Name: Erika Perera  MRN: [id number]  Admission Date: 4/14/2022  Hospital Length of Stay: 2 days  Attending Provider: Ila Garcia MD   Primary Care Physician: Minor Bennett MD  Principal Problem:Endophthalmitis      Interval History: Continued hypoglycemia and bloody bowel movements overnight. Receiving transfusion this afternoon. Due for HD     Objective:     Vital Signs (Most Recent):  Temp: 97.8 °F (36.6 °C) (04/16/22 1423)  Pulse: (!) 58 (04/16/22 1423)  Resp: 15 (04/16/22 1423)  BP: (!) 155/69 (04/16/22 1423)  SpO2: 96 % (04/16/22 1423)  O2 Device (Oxygen Therapy): nasal cannula (04/16/22 1423)   Vital Signs (24h Range):  Temp:  [97.2 °F (36.2 °C)-99.7 °F (37.6 °C)] 97.8 °F (36.6 °C)  Pulse:  [48-60] 58  Resp:  [12-21] 15  SpO2:  [90 %-100 %] 96 %  BP: (125-163)/(60-90) 155/69     Weight: 81.6 kg (180 lb) (04/14/22 1454)  Body mass index is 29.95 kg/m².  Body surface area is 1.93 meters squared.    I/O last 3 completed shifts:  In: 1043.8 [P.O.:240; Blood:262.5; IV Piggyback:541.3]  Out: -     Physical Exam  Constitutional:       General: She is not in acute distress.  HENT:      Head: Normocephalic.      Comments: L eye orbital and periorbital edema   Cardiovascular:      Rate and Rhythm: Normal rate.   Pulmonary:      Effort: Pulmonary effort is normal. No respiratory distress.   Abdominal:      Palpations: Abdomen is soft.      Tenderness: There is no abdominal tenderness.   Musculoskeletal:      Right lower leg: No edema.      Left lower leg: No edema.   Skin:     General: Skin is warm.   Neurological:      Mental Status: She is alert and oriented to person, place, and time.   Psychiatric:         Mood and Affect: Mood normal.         Behavior: Behavior normal.       Significant Labs:  CBC:   Recent Labs   Lab 04/16/22  0357   WBC 15.22*   RBC 2.53*   HGB 7.2*   HCT 24.2*      MCV 96   MCH 28.5   MCHC  29.8*       CMP:   Recent Labs   Lab 04/16/22  0357   *   CALCIUM 8.1*   *   K 4.1   CO2 25   CL 94*   BUN 50*   CREATININE 6.1*             Assessment/Plan:     * Endophthalmitis  Ophthalmology on board    ESRD (end stage renal disease)  Nephrology History  iHD Schedule: MWF  Unit/MD: [facility], Dr. Felix  EDW: ?  Access: LUE AVF  Residual Renal Function: anuric    Plan/Recommendations:     -HD today for metabolic clearance and volume management   -Strict I/O's   -Renally dose meds/avoid nephrotoxic meds   -Renal diet/formulations, if not NPO     Anemia of Chronic Kidney Disease   Hgb goal in ESRD is Hgb of 10-11.   Transfuse for Hg <7     Mineral Bone Disease in CKD   Renal diet if not NPO  Continue home phos binder   Daily RFP    Lab Results   Component Value Date    CALCIUM 8.1 (L) 04/16/2022    PHOS 4.1 04/15/2022                Thank you for your consult. I will follow-up with patient. Please contact us if you have any additional questions.    Spencer Giron, NP  Nephrology  [facility] ([place])

## 2022-04-16 NOTE — ASSESSMENT & PLAN NOTE
Key History and Diagnostic Findings  - A1c 5.0 on 04/14/2022 from 5.5 on 04/06/2022 likely indicating frequent hypoglycemia in this 82-year-old female  - Home Regimen: Glimepiride 4 mg daily  - Glucose Goals: 160-200mg/dL  - 24 hour glucose trend:  Frequent hypoglycemia    Plan  - Please see plan as above

## 2022-04-16 NOTE — CARE UPDATE
RAPID RESPONSE NURSE ROUND       Rounding completed with charge RNTata. Patient given D10 and glucagon today for hypoglycemia.  Started patient on D5W gtt. Continuous cardiac monitoring and pulse ox ordered.  Patient with orders for dialysis today.  Currently HDS on RA.  Instructed to call [phone] for further concerns or assistance.

## 2022-04-16 NOTE — ASSESSMENT & PLAN NOTE
Nephrology History  iHD Schedule: MWF  Unit/MD: Dr. Isidro Nath  EDW: ?  Access: LUE AVF  Residual Renal Function: anuric    Plan/Recommendations:     -HD today for metabolic clearance and volume management   -Strict I/O's   -Renally dose meds/avoid nephrotoxic meds   -Renal diet/formulations, if not NPO     Anemia of Chronic Kidney Disease   Hgb goal in ESRD is Hgb of 10-11.   Transfuse for Hg <7     Mineral Bone Disease in CKD   Renal diet if not NPO  Continue home phos binder   Daily RFP    Lab Results   Component Value Date    CALCIUM 8.1 (L) 04/16/2022    PHOS 4.1 04/15/2022

## 2022-04-16 NOTE — ASSESSMENT & PLAN NOTE
-A1c ordered and pending  - Hold diabetes medication secondary to hypoglycemia  - discontinue glimepiride indefinitely

## 2022-04-16 NOTE — NURSING
Received report on pt and pt arrived to floor with 1 unit PRBC transfusing. V/S WDL, pt reports some eye pain but no distress. Will continue to monitor.

## 2022-04-16 NOTE — ASSESSMENT & PLAN NOTE
- patient went to \[facility]\"" for pedicure two weeks ago and developed ulcer right first toe  - XRAY demonstrates evidence of osteomyelitis   - Continue abx coverage  - consult podiatry for wound care and possible amputation when patient stable

## 2022-04-16 NOTE — SUBJECTIVE & OBJECTIVE
Interval History: Patient continued to have hypoglycemia overnight.  Octreotide was added this AM.  She continues to have bloody BM.  Per family, she take ticagrelor which was recently started. She admits to left eye pain today and HA.  No appetite but denies n/v.     Review of Systems   Constitutional:  Positive for activity change. Negative for fever.   Eyes:  Positive for pain and visual disturbance.   Respiratory:  Negative for shortness of breath.    Cardiovascular:  Positive for leg swelling. Negative for chest pain.   Gastrointestinal:  Positive for constipation. Negative for abdominal pain, nausea and vomiting.   Neurological:  Positive for headaches. Negative for dizziness.   Objective:     Vital Signs (Most Recent):  Temp: 98.3 °F (36.8 °C) (04/16/22 1355)  Pulse: (!) 57 (04/16/22 1355)  Resp: 18 (04/16/22 1406)  BP: (!) 155/67 (04/16/22 1355)  SpO2: 97 % (04/16/22 1355)   Vital Signs (24h Range):  Temp:  [97.2 °F (36.2 °C)-99.7 °F (37.6 °C)] 98.3 °F (36.8 °C)  Pulse:  [48-60] 57  Resp:  [12-21] 18  SpO2:  [90 %-100 %] 97 %  BP: (125-163)/(60-90) 155/67     Weight: 81.6 kg (180 lb)  Body mass index is 29.95 kg/m².    Intake/Output Summary (Last 24 hours) at 4/16/2022 1417  Last data filed at 4/16/2022 1355  Gross per 24 hour   Intake 1695.16 ml   Output --   Net 1695.16 ml      Physical Exam  Vitals and nursing note reviewed.   Constitutional:       Appearance: Normal appearance. She is not ill-appearing.   HENT:      Head: Normocephalic and atraumatic.      Nose: Nose normal.      Mouth/Throat:      Mouth: Mucous membranes are moist.      Pharynx: Oropharynx is clear.   Eyes:      General:         Left eye: Discharge present.     Comments: Bilateral exophthalmus, left ptosis, + left hypopyon    Cardiovascular:      Rate and Rhythm: Normal rate and regular rhythm.      Heart sounds: No murmur heard.  Pulmonary:      Effort: Pulmonary effort is normal. No respiratory distress.      Breath sounds: Normal  breath sounds.   Abdominal:      General: Bowel sounds are normal. There is no distension.      Palpations: Abdomen is soft.   Musculoskeletal:         General: No deformity. Normal range of motion.      Right lower leg: Edema present.      Left lower leg: Edema present.   Skin:     General: Skin is warm and dry.   Neurological:      General: No focal deficit present.      Mental Status: She is oriented to person, place, and time.   Psychiatric:         Mood and Affect: Mood normal.         Behavior: Behavior normal.       Significant Labs: All pertinent labs within the past 24 hours have been reviewed.    Significant Imaging: I have reviewed all pertinent imaging results/findings within the past 24 hours.

## 2022-04-16 NOTE — ASSESSMENT & PLAN NOTE
83 y/o female with a hx of ESRD on HD MWF vis AV LUE, CAD, HF, HTN, DM2, and recent admission to OSH 4/5-4/8 for Klebsiella pneumoniae bacteremia thought to be 2/2 UTI. Culture results show pansensitive klebsiella, and the patient was discharged on PO ciprofloxacin. Now with eye swelling and vision changes for 1-2 weeks.  Seen by ophthalmology with concern for endophthalmitis - CT orbit with minimal asymmetric enhancement at the periphery of the left globe compared to the right could represent a mild inflammatory or infectious process. Due to lack of trauma and wounds to the eye, expect hematogenous.  Source of her bacteremia could be urinary vs osteomyelitis of toe.  Klebsiella pneumoniae is also known to cause liver abscesses as well.      Recommendations:    1. Continue IV CTX 2 g IV q 12.  2. Follow up abdominal ultrasound.  3. Appreciate ophthalmology input.  4. Follow up on blood cultures.

## 2022-04-16 NOTE — ASSESSMENT & PLAN NOTE
- IV PPI bid  - serial Hb/Hct  - transfuse total 3U PRBC  - stop ASA and prophylactic heparin   - GI consulted

## 2022-04-16 NOTE — ASSESSMENT & PLAN NOTE
IDSA Moderate diabetic foot infection with involvement of bone. Right foot xray suggestive of osteomyelitis of the distal phalanx at the right great toe. Neuropathic ulceration at the tip of the R great toe without cardinal signs of infection.     - Patient consented to bedside excisional debridement and bone biopsy, preformed   - Bone biopsy obtained and sent for micro    - Recommend consultation to ID after bone biopsy speciates for 6 weeks tailored ABx therapy   - Continue IV ABx per ID  - Discussed possibility of partial/complete R great toe amputation if long term ABx therapy fails   - WB: No restrictions   - Nursing wound care routine ordered   - Cleansed with vashe, dressed with iodosorb and mepilex boarder    - Podiatry will follow

## 2022-04-16 NOTE — HPI
82 year old  female with T2DM, ESRD (on HD since November 2021), hypothyroidism, CAD, chronic diastolic heart failure, HTN, and HLD who presented to the ED from her ophthalmologist's clinic for evaluation of left eye endophthalmitis.  Patient reports progressively worsening left eye pain and vision loss with swelling of the upper and lower eyelids over the week prior to admission.  She was seen first in optometry clinic and was referred to ophthalmology the day of admission for concerns for endophthalmitis. She was then sent to the ED for CT orbits and admission for likely need for enucleation and evisceration due to the severity of the disease. Of note, patient was recently admitted to [facility] 04/05/2022 through 04/08/2022 for Klebsiella pneumoniae bacteremia thought to be secondary to UTI. Patient was discharged on oral ciprofloxacin and reports compliance with the regimen. She denies any fevers or chills.    - Endocrinology consulted for evaluation of hypoglycemia    Regarding Diabetes Mellitus:    - Initially diagnosed with Type 2 diabetes mellitus:  Over 15 years ago per son  - Home diabetic medications include: Glimepiride 4 mg daily though does not take it daily, last dose was reportedly 1 week prior to admission per son  - A1c 5.0 on 04/14/2022 from 5.5 on 04/06/2022 likely indicating frequent hypoglycemia in this 82-year-old female.  She reports hypoglycemia was never an issue until hemodialysis sessions started late last year  - Known diabetic complications: nephropathy, retinopathy, peripheral neuropathy, and cardiovascular disease  - Cardiovascular risk factors: advanced age (older than 55 for men, 65 for women), diabetes mellitus, dyslipidemia, family history of premature cardiovascular disease, and hypertension

## 2022-04-16 NOTE — CARE UPDATE
RAPID RESPONSE NURSE PROACTIVE ROUNDING NOTE       Time of Visit: 023    Admit Date: 2022  LOS: 2  Code Status: Full Code   Date of Visit: 2022  : 1940  Age: 82 y.o.  Sex: female  Race: Black or   Bed: 49547/09657 A:   MRN: [id number]  Was the patient discharged from an ICU this admission? No   Was the patient discharged from a PACU within last 24 hours? No   Did the patient receive conscious sedation/general anesthesia in last 24 hours? No  Was the patient in the ED within the past 24 hours? No  Was the patient on NIPPV within the past 24 hours? No   Attending Physician: Ila Garcia MD  Primary Service: [facility]   Time spent at the bedside: 15 -30 min    SITUATION    Notified by charge RN via phone call.  Reason for alert: hypoglycemia  Called to evaluate the patient for Circulatory    BACKGROUND     Why is the patient in the hospital?: Endophthalmitis    Patient has a past medical history of DM2 (diabetes mellitus, type 2) and HTN (hypertension).    Last Vitals:  Temp: 97.5 °F (36.4 °C) (434)  Pulse: 54 (434)  Resp: 14 (434)  BP: 138/64 (434)  SpO2: 96 % ( 0543)    24 Hours Vitals Range:  Temp:  [97.2 °F (36.2 °C)-99.7 °F (37.6 °C)]   Pulse:  [51-69]   Resp:  [14-20]   BP: (125-169)/(60-94)   SpO2:  [94 %-99 %]     Labs:  Recent Labs     04/14/22  1625 04/15/22  0238 22  0357   WBC 13.85* 11.88 15.22*   HGB 7.7* 6.4* 7.2*   HCT 25.5* 21.1* 24.2*    307 276       Recent Labs     04/14/22  1625 04/15/22  0238 04/15/22  2327 22  0357    132*  --  134*   K 4.1 4.2  --  4.1   CL 96 95  --  94*   CO2 28 26  --  25   CREATININE 4.5* 4.7*  --  6.1*   GLU 71 71 86 122*   PHOS  --  4.1  --   --    MG  --  2.0  --   --         No results for input(s): PH, PCO2, PO2, HCO3, POCSATURATED, BE in the last 72 hours.     ASSESSMENT  D10 gtt infusing at 50cc/hr.  Patient easily arousable to voice and maintaining conversation.     Diaphoretic and warm.  2+ dependent edema.  Breathing appears even and unlabored.  Left eye edematous and red.  HR 48, SpO2 100% on RA, RR 18, /70.    INTERVENTIONS  Glucagon administered.  Apple juice and icecream given to patient.  POCT glucose 71.  D10 bolus administered.   Peripheral IV started.  Labs collected and sent.    POCT glucose obtained at time of lab collection.    RECOMMENDATIONS  Maintain continuous cardiac monitoring and continuous pulse ox.  Continue close monitoring of VS, respiratory status, and neuro status.   Inform primary team of new oxygen requirements or increased oxygen requirements.  Encourage PO intake.  Follow up with lab results.    PROVIDER ESCALATION    Yes/No  yes    Orders received and case discussed with Dr. Price.    Disposition: Remain in room [address]10.    FOLLOW-UP    Charge Tata MICHELLE updated on plan of care. Instructed to call the Rapid Response Nurse, Wilma Foster, RN at [phone] for additional questions or concerns.

## 2022-04-16 NOTE — SUBJECTIVE & OBJECTIVE
Scheduled Meds:   sodium chloride 0.9%   Intravenous Once    carvediloL  3.125 mg Oral BID WM    cefTRIAXone (ROCEPHIN) IVPB  2 g Intravenous Q12H    epoetin maximilian-epbx  50 Units/kg Intravenous Every Mon, Wed, Fri    fluticasone furoate-vilanteroL  1 puff Inhalation Daily    hydrALAZINE  25 mg Oral TID    isosorbide mononitrate  20 mg Oral BID    levothyroxine  150 mcg Oral Daily    mupirocin   Nasal BID    octreotide  50 mcg Subcutaneous Q8H    pantoprozole (PROTONIX) IV  40 mg Intravenous Q12H    polyethylene glycol  17 g Oral Daily     Continuous Infusions:   dextrose 10 % in water (D10W) 50 mL/hr at 04/16/22 1007     PRN Meds:sodium chloride, sodium chloride 0.9%, acetaminophen, albuterol-ipratropium, dextrose 10%, dextrose 10%, heparin (porcine), insulin aspart U-100, melatonin, naloxone, ondansetron, oxyCODONE, oxyCODONE, prochlorperazine, sodium chloride 0.9%, sodium chloride 0.9%    Review of patient's allergies indicates:   Allergen Reactions    Amlodipine Other (See Comments)    Atorvastatin Other (See Comments)    Nebivolol Other (See Comments)        Past Medical History:   Diagnosis Date    DM2 (diabetes mellitus, type 2) 4/14/2022    HTN (hypertension) 4/14/2022     Past Surgical History:   Procedure Laterality Date    CATARACT EXTRACTION Bilateral        Family History       Problem Relation (Age of Onset)    No Known Problems Mother, Father, Sister, Brother, Maternal Aunt, Maternal Uncle, Paternal Aunt, Paternal Uncle, Maternal Grandmother, Maternal Grandfather, Paternal Grandmother, Paternal Grandfather          Tobacco Use    Smoking status: Never Smoker    Smokeless tobacco: Never Used   Substance and Sexual Activity    Alcohol use: Not on file    Drug use: Not on file    Sexual activity: Not on file     Review of Systems   Constitutional:  Positive for activity change. Negative for fever.   Eyes:  Positive for pain and visual disturbance.   Respiratory:  Negative for shortness of breath.     Cardiovascular:  Positive for leg swelling. Negative for chest pain.   Gastrointestinal:  Positive for constipation. Negative for abdominal pain, nausea and vomiting.   Skin:  Positive for wound.   Neurological:  Positive for headaches. Negative for dizziness.   Objective:     Vital Signs (Most Recent):  Temp: 97.8 °F (36.6 °C) (04/16/22 1423)  Pulse: (!) 59 (04/16/22 1554)  Resp: 16 (04/16/22 1724)  BP: 133/62 (04/16/22 1554)  SpO2: 97 % (04/16/22 1554)   Vital Signs (24h Range):  Temp:  [97.2 °F (36.2 °C)-99.3 °F (37.4 °C)] 97.8 °F (36.6 °C)  Pulse:  [48-59] 59  Resp:  [12-21] 16  SpO2:  [90 %-100 %] 97 %  BP: (125-156)/(60-92) 133/62     Weight: 81.6 kg (180 lb)  Body mass index is 29.95 kg/m².    Foot Exam    Right Foot/Ankle     Inspection and Palpation  Ecchymosis: none  Tenderness: none   Swelling: none   Skin Exam: skin changes and ulcer; no blister, no callus, no drainage, no dry skin, skin not intact, no maceration, no tinea, no cellulitis, no corn, no fissure, no abnormal color, no erythema and no warts     Neurovascular  Dorsalis pedis: 1+  Posterior tibial: 1+  Saphenous nerve sensation: diminished  Tibial nerve sensation: diminished  Superficial peroneal nerve sensation: diminished  Deep peroneal nerve sensation: diminished  Sural nerve sensation: diminished      Left Foot/Ankle      Inspection and Palpation  Ecchymosis: none  Tenderness: none   Swelling: none   Skin Exam: skin intact; no blister, no callus, no drainage, no dry skin, no maceration, no tinea, no cellulitis, no corn, no fissure, no skin changes, no abnormal color, no ulcer, no erythema and no warts     Neurovascular  Dorsalis pedis: 1+  Posterior tibial: 1+  Saphenous nerve sensation: diminished  Tibial nerve sensation: diminished  Superficial peroneal nerve sensation: diminished  Deep peroneal nerve sensation: diminished  Sural nerve sensation: diminished      Laboratory:  CBC:   Recent Labs   Lab 04/16/22  0357   WBC 15.22*   RBC  2.53*   HGB 7.2*   HCT 24.2*      MCV 96   MCH 28.5   MCHC 29.8*     CMP:   Recent Labs   Lab 04/16/22  0357   *   CALCIUM 8.1*   *   K 4.1   CO2 25   CL 94*   BUN 50*   CREATININE 6.1*     CRP: No results for input(s): CRP in the last 168 hours.  ESR: No results for input(s): SEDRATE in the last 168 hours.  All pertinent labs reviewed within the last 24 hours.    Diagnostic Results:  I have reviewed all pertinent imaging results/findings within the past 24 hours.    X-Ray Foot 2 View Right [[id number]] (Abnormal) Resulted: 04/15/22 1526   Order Status: Completed Updated: 04/15/22 1529   Narrative:     EXAMINATION:   XR FOOT 2 VIEW RIGHT     CLINICAL HISTORY:   infected right 1st toe;     TECHNIQUE:   AP and lateral radiographs of the right foot were performed.     COMPARISON:   None     FINDINGS:   There is soft tissue swelling of the 1st toe.  There is regional osteopenia of the 1st distal phalanx with erosive changes of the tuft and along the lateral aspect of the base.     Diffuse osteopenia.  No acute fractures.  Scattered degenerative changes.  Pes planus.  Mild Achilles enthesopathy.  Plantar calcaneal spur.  Vascular calcifications.    Impression:       Osteomyelitis of the 1st distal phalanx.     This report was flagged in Epic as abnormal.       Electronically signed by: Sánchez Dennison   Date: 04/15/2022        Clinical Findings:  Stable full thickness ulceration at the distal aspect of the right great toe, with overlying hyperkeratotic cap prior to debridement. No clinical signs of infection.     Post debridement measurements 0.8 x 0.6 x 0.2 cm   Wound base 85% granular, 15% fibrotic, 0% necrotic  Hyperkeratotic periwound

## 2022-04-16 NOTE — SUBJECTIVE & OBJECTIVE
Interval HPI:   Overnight events:  Had maroon-colored stool with low hemoglobin prompting concern for GI bleed in addition to multiple hypoglycemic events  Eating:   <25%  Nausea: No  Hypoglycemia and intervention: Yes  Fever: No  TPN and/or TF: No  If yes, type of TF/TPN and rate: NA    PMH, PSH, FH, SH updated and reviewed     ROS:  Review of Systems   Constitutional:  Positive for fatigue.   Endocrine: Negative for cold intolerance, heat intolerance, polydipsia, polyphagia and polyuria.   Neurological:  Positive for light-headedness.     Current Medications and/or Treatments Impacting Glycemic Control  Immunotherapy:    Immunosuppressants       None          Steroids:   Hormones (From admission, onward)                Start     Stop Route Frequency Ordered    04/16/22 1400  octreotide injection 50 mcg        Question:  Is the patient competent?  Answer:  Yes    04/17 1359 SubQ Every 8 hours 04/16/22 0752    04/14/22 2041  melatonin tablet 6 mg         -- Oral Nightly PRN 04/14/22 1942          Pressors:    Autonomic Drugs (From admission, onward)                None          Hyperglycemia/Diabetes Medications:   Antihyperglycemics (From admission, onward)                Start     Stop Route Frequency Ordered    04/14/22 2234  insulin aspart U-100 pen 0-5 Units         -- SubQ Before meals & nightly PRN 04/14/22 2134             PHYSICAL EXAMINATION:  Vitals:    04/16/22 1005   BP:    Pulse:    Resp:    Temp: 98.1 °F (36.7 °C)     Body mass index is 29.95 kg/m².    Physical Exam  Constitutional:       Appearance: Normal appearance.   Eyes:      General:         Left eye: Discharge present.  Abdominal:      General: Abdomen is flat.   Neurological:      General: No focal deficit present.      Mental Status: She is alert and oriented to person, place, and time.   Psychiatric:         Mood and Affect: Mood normal.         Behavior: Behavior normal.

## 2022-04-16 NOTE — ASSESSMENT & PLAN NOTE
Key History and Diagnostic Findings  - On levothyroxine 150 mcg daily at home  - No recent TFTs in system    Plan  - Continue levothyroxine 150 mcg daily, follow-up TSH tomorrow  - Goal TSH in elderly with multiple comorbidities: 4-7  - Avoid hyperthyroidism due to Cardiovascular disease, rate/rhythm abnormalities, and bone effects  - Ensure proper administration

## 2022-04-16 NOTE — CONSULTS
[facility] ([place] [address])  Endocrinology  Diabetes Consult Note    Consult Requested by: Ila Garcia MD   Reason for admit: Endophthalmitis    HISTORY OF PRESENT ILLNESS:  82 year old  female with T2DM, ESRD (on HD since November 2021), hypothyroidism, CAD, chronic diastolic heart failure, HTN, and HLD who presented to the ED from her ophthalmologist's clinic for evaluation of left eye endophthalmitis.  Patient reports progressively worsening left eye pain and vision loss with swelling of the upper and lower eyelids over the week prior to admission.  She was seen first in optometry clinic and was referred to ophthalmology the day of admission for concerns for endophthalmitis. She was then sent to the ED for CT orbits and admission for likely need for enucleation and evisceration due to the severity of the disease. Of note, patient was recently admitted to [facility] 04/05/2022 through 04/08/2022 for Klebsiella pneumoniae bacteremia thought to be secondary to UTI. Patient was discharged on oral ciprofloxacin and reports compliance with the regimen. She denies any fevers or chills.    - Endocrinology consulted for evaluation of hypoglycemia    Regarding Diabetes Mellitus:    - Initially diagnosed with Type 2 diabetes mellitus:  Over 15 years ago per son  - Home diabetic medications include: Glimepiride 4 mg daily though does not take it daily, last dose was reportedly 1 week prior to admission per son  - A1c 5.0 on 04/14/2022 from 5.5 on 04/06/2022 likely indicating frequent hypoglycemia in this 82-year-old female.  She reports hypoglycemia was never an issue until hemodialysis sessions started late last year  - Known diabetic complications: nephropathy, retinopathy, peripheral neuropathy, and cardiovascular disease  - Cardiovascular risk factors: advanced age (older than 55 for men, 65 for women), diabetes mellitus, dyslipidemia, family history of premature cardiovascular disease, and  hypertension      Interval HPI:   Overnight events:  Had maroon-colored stool with low hemoglobin prompting concern for GI bleed in addition to multiple hypoglycemic events  Eating:   <25%  Nausea: No  Hypoglycemia and intervention: Yes  Fever: No  TPN and/or TF: No  If yes, type of TF/TPN and rate: NA    PMH, PSH, FH, SH updated and reviewed     ROS:  Review of Systems   Constitutional:  Positive for fatigue.   Endocrine: Negative for cold intolerance, heat intolerance, polydipsia, polyphagia and polyuria.   Neurological:  Positive for light-headedness.     Current Medications and/or Treatments Impacting Glycemic Control  Immunotherapy:    Immunosuppressants       None          Steroids:   Hormones (From admission, onward)                Start     Stop Route Frequency Ordered    04/16/22 1400  octreotide injection 50 mcg        Question:  Is the patient competent?  Answer:  Yes    04/17 1359 SubQ Every 8 hours 04/16/22 0752    04/14/22 2041  melatonin tablet 6 mg         -- Oral Nightly PRN 04/14/22 1942          Pressors:    Autonomic Drugs (From admission, onward)                None          Hyperglycemia/Diabetes Medications:   Antihyperglycemics (From admission, onward)                Start     Stop Route Frequency Ordered    04/14/22 2234  insulin aspart U-100 pen 0-5 Units         -- SubQ Before meals & nightly PRN 04/14/22 2134             PHYSICAL EXAMINATION:  Vitals:    04/16/22 1005   BP:    Pulse:    Resp:    Temp: 98.1 °F (36.7 °C)     Body mass index is 29.95 kg/m².    Physical Exam  Constitutional:       Appearance: Normal appearance.   Eyes:      General:         Left eye: Discharge present.  Abdominal:      General: Abdomen is flat.   Neurological:      General: No focal deficit present.      Mental Status: She is alert and oriented to person, place, and time.   Psychiatric:         Mood and Affect: Mood normal.         Behavior: Behavior normal.         Labs Reviewed and Include   Recent Labs    Lab 04/16/22  0357   *   CALCIUM 8.1*   *   K 4.1   CO2 25   CL 94*   BUN 50*   CREATININE 6.1*     Lab Results   Component Value Date    WBC 15.22 (H) 04/16/2022    HGB 7.2 (L) 04/16/2022    HCT 24.2 (L) 04/16/2022    MCV 96 04/16/2022     04/16/2022     No results for input(s): TSH, FREET4 in the last 168 hours.  Lab Results   Component Value Date    HGBA1C 5.0 04/14/2022       Nutritional status:   Body mass index is 29.95 kg/m².  No results found for: ALBUMIN  No results found for: PREALBUMIN    Estimated Creatinine Clearance: 7.5 mL/min (A) (based on SCr of 6.1 mg/dL (H)).    Accu-Checks  Recent Labs     04/15/22  2302 04/15/22  2354 04/16/22  0223 04/16/22  0224 04/16/22  0302 04/16/22  0327 04/16/22  0408 04/16/22  0700 04/16/22  0758 04/16/22  1004   POCTGLUCOSE 82 100 31* 29* 71 93 140* 84 68* 133*        ASSESSMENT and PLAN    * Endophthalmitis  - Management per Ophthalmology    Hypoglycemia  Key History and Diagnostic Findings  - Secondary to glimepiride in the setting of ESRD worsened by poor oral intake  - May take several days for glimepiride to clear from her system  - C-peptide elevated at 14 indicating endogenous insulin production responsible for hypoglycemia; expected with glimepiride and ESRD    Plan  - Agree with holding all diabetes medications at this time and using D5 drip to maintain blood sugar greater than 120 with fingerstick checks every 2 hours  - Await sulfonyurea screen to confirm presence of glimepiride  - If seeing glucose values in the 200s, discontinue D5 drip.  If glucose values persist in the 200s recommend low correction scale insulin, no scheduled insulin    DM2 (diabetes mellitus, type 2)  Key History and Diagnostic Findings  - A1c 5.0 on 04/14/2022 from 5.5 on 04/06/2022 likely indicating frequent hypoglycemia in this 82-year-old female  - Home Regimen: Glimepiride 4 mg daily  - Glucose Goals: 160-200mg/dL  - 24 hour glucose trend:  Frequent  hypoglycemia    Plan  - Please see plan as above    ESRD (end stage renal disease)  Key History and Diagnostic Findings  - Decreased renal clearance predisposing to decreased metabolism of glimepiride allowing buildup and resultant hypoglycemia    Plan  - As above, further management per Nephrology    Secondary hypothyroidism  Key History and Diagnostic Findings  - On levothyroxine 150 mcg daily at home  - No recent TFTs in system    Plan  - Continue levothyroxine 150 mcg daily, follow-up TSH tomorrow  - Goal TSH in elderly with multiple comorbidities: 4-7  - Avoid hyperthyroidism due to Cardiovascular disease, rate/rhythm abnormalities, and bone effects  - Ensure proper administration        Plan discussed with patient, family, and RN at bedside.     Dago Reinoso DO  [facility], [address]th [address]  [address] [place], [place], [address]    Office: [phone]  Fax: [phone]    Disclaimer: This note has been generated using voice-recognition software. There may be typographical errors that have been missed during proof-reading.    The above history labs imaging impression and plan were discussed with attending physician who is in agreement and also took part in this patient's care.  I personally reviewed all of the patients available medications, labs, imaging, vitals, allergies, medical history.

## 2022-04-16 NOTE — HPI
82 year old woman presenting from ophthalmology clinic for evaluation of 2 weeks of progressive L perioccular swelling, L perioccular pain, and worsening vision of the L eye. She has a history of T2DM, ESRD(MWF), HTN, HLD. She has associated chills and night sweats that began during this time as well. She denies any pain. She notes that she noticed the ulcer at the tip of her R great toe several weeks ago. She denies any pain, drainage, mal odor, redness, or swelling to the right foot. Patient notes she does not see a podiatrist outpatient and does not wear diabetic shoes. Podiatry was consulted for osteomyelitis of right great toe.

## 2022-04-16 NOTE — SUBJECTIVE & OBJECTIVE
Interval History: Continued hypoglycemia and bloody bowel movements overnight. Receiving transfusion this afternoon. Due for HD     Objective:     Vital Signs (Most Recent):  Temp: 97.8 °F (36.6 °C) (04/16/22 1423)  Pulse: (!) 58 (04/16/22 1423)  Resp: 15 (04/16/22 1423)  BP: (!) 155/69 (04/16/22 1423)  SpO2: 96 % (04/16/22 1423)  O2 Device (Oxygen Therapy): nasal cannula (04/16/22 1423)   Vital Signs (24h Range):  Temp:  [97.2 °F (36.2 °C)-99.7 °F (37.6 °C)] 97.8 °F (36.6 °C)  Pulse:  [48-60] 58  Resp:  [12-21] 15  SpO2:  [90 %-100 %] 96 %  BP: (125-163)/(60-90) 155/69     Weight: 81.6 kg (180 lb) (04/14/22 1454)  Body mass index is 29.95 kg/m².  Body surface area is 1.93 meters squared.    I/O last 3 completed shifts:  In: 1043.8 [P.O.:240; Blood:262.5; IV Piggyback:541.3]  Out: -     Physical Exam  Constitutional:       General: She is not in acute distress.  HENT:      Head: Normocephalic.      Comments: L eye orbital and periorbital edema   Cardiovascular:      Rate and Rhythm: Normal rate.   Pulmonary:      Effort: Pulmonary effort is normal. No respiratory distress.   Abdominal:      Palpations: Abdomen is soft.      Tenderness: There is no abdominal tenderness.   Musculoskeletal:      Right lower leg: No edema.      Left lower leg: No edema.   Skin:     General: Skin is warm.   Neurological:      Mental Status: She is alert and oriented to person, place, and time.   Psychiatric:         Mood and Affect: Mood normal.         Behavior: Behavior normal.       Significant Labs:  CBC:   Recent Labs   Lab 04/16/22 0357   WBC 15.22*   RBC 2.53*   HGB 7.2*   HCT 24.2*      MCV 96   MCH 28.5   MCHC 29.8*       CMP:   Recent Labs   Lab 04/16/22 0357   *   CALCIUM 8.1*   *   K 4.1   CO2 25   CL 94*   BUN 50*   CREATININE 6.1*

## 2022-04-16 NOTE — ASSESSMENT & PLAN NOTE
-No complaints of chest pain, baseline EKG ordered. TTE ordered to rule out vegetetation in setting of bacteremia with concern for endogenous bacterial endophthalmitis  -Continue home statin

## 2022-04-16 NOTE — ASSESSMENT & PLAN NOTE
Patient with x-ray evidence of right foot osteomyelitis.  Might be secondary to Klebsiella pneumoniae.  Would need a bone biopsy with culture and pathology to fully assess.    Plan    1. Continue ceftriaxone IV.    2. Appreciate podiatry expertise.    3. Will follow up on cultures from the toe.

## 2022-04-16 NOTE — ASSESSMENT & PLAN NOTE
Key History and Diagnostic Findings  - Secondary to glimepiride in the setting of ESRD worsened by poor oral intake  - May take several days for glimepiride to clear from her system  - C-peptide elevated at 14 indicating endogenous insulin production responsible for hypoglycemia; expected with glimepiride and ESRD    Plan  - Agree with holding all diabetes medications at this time and using D5 drip to maintain blood sugar greater than 120 with fingerstick checks every 2 hours  - Await sulfonyurea screen to confirm presence of glimepiride  - If seeing glucose values in the 200s, discontinue D5 drip.  If glucose values persist in the 200s recommend low correction scale insulin, no scheduled insulin

## 2022-04-16 NOTE — ASSESSMENT & PLAN NOTE
Key History and Diagnostic Findings  - Decreased renal clearance predisposing to decreased metabolism of glimepiride allowing buildup and resultant hypoglycemia    Plan  - As above, further management per Nephrology

## 2022-04-17 LAB
ALBUMIN SERPL BCP-MCNC: 2 G/DL (ref 3.5–5.2)
ALP SERPL-CCNC: 112 U/L (ref 55–135)
ALT SERPL W/O P-5'-P-CCNC: 9 U/L (ref 10–44)
ANION GAP SERPL CALC-SCNC: 13 MMOL/L (ref 8–16)
AST SERPL-CCNC: 43 U/L (ref 10–40)
BASOPHILS # BLD AUTO: 0.04 K/UL (ref 0–0.2)
BASOPHILS NFR BLD: 0.3 % (ref 0–1.9)
BILIRUB SERPL-MCNC: 1.5 MG/DL (ref 0.1–1)
BUN SERPL-MCNC: 26 MG/DL (ref 8–23)
CALCIUM SERPL-MCNC: 8.6 MG/DL (ref 8.7–10.5)
CHLORIDE SERPL-SCNC: 97 MMOL/L (ref 95–110)
CO2 SERPL-SCNC: 21 MMOL/L (ref 23–29)
CREAT SERPL-MCNC: 3.9 MG/DL (ref 0.5–1.4)
DIFFERENTIAL METHOD: ABNORMAL
EOSINOPHIL # BLD AUTO: 0.1 K/UL (ref 0–0.5)
EOSINOPHIL NFR BLD: 0.7 % (ref 0–8)
ERYTHROCYTE [DISTWIDTH] IN BLOOD BY AUTOMATED COUNT: 14.9 % (ref 11.5–14.5)
EST. GFR  (AFRICAN AMERICAN): 11.7 ML/MIN/1.73 M^2
EST. GFR  (NON AFRICAN AMERICAN): 10.1 ML/MIN/1.73 M^2
GLUCOSE SERPL-MCNC: 245 MG/DL (ref 70–110)
HCT VFR BLD AUTO: 31.7 % (ref 37–48.5)
HGB BLD-MCNC: 10.3 G/DL (ref 12–16)
IMM GRANULOCYTES # BLD AUTO: 0.26 K/UL (ref 0–0.04)
IMM GRANULOCYTES NFR BLD AUTO: 1.7 % (ref 0–0.5)
LYMPHOCYTES # BLD AUTO: 0.8 K/UL (ref 1–4.8)
LYMPHOCYTES NFR BLD: 5.1 % (ref 18–48)
MCH RBC QN AUTO: 29.6 PG (ref 27–31)
MCHC RBC AUTO-ENTMCNC: 32.5 G/DL (ref 32–36)
MCV RBC AUTO: 91 FL (ref 82–98)
MONOCYTES # BLD AUTO: 0.7 K/UL (ref 0.3–1)
MONOCYTES NFR BLD: 4.5 % (ref 4–15)
NEUTROPHILS # BLD AUTO: 13.3 K/UL (ref 1.8–7.7)
NEUTROPHILS NFR BLD: 87.7 % (ref 38–73)
NRBC BLD-RTO: 0 /100 WBC
PLATELET # BLD AUTO: 300 K/UL (ref 150–450)
PMV BLD AUTO: 9.4 FL (ref 9.2–12.9)
POCT GLUCOSE: 142 MG/DL (ref 70–110)
POCT GLUCOSE: 160 MG/DL (ref 70–110)
POCT GLUCOSE: 185 MG/DL (ref 70–110)
POCT GLUCOSE: 245 MG/DL (ref 70–110)
POCT GLUCOSE: 253 MG/DL (ref 70–110)
POCT GLUCOSE: 255 MG/DL (ref 70–110)
POCT GLUCOSE: 263 MG/DL (ref 70–110)
POTASSIUM SERPL-SCNC: 4.7 MMOL/L (ref 3.5–5.1)
PROT SERPL-MCNC: 6.5 G/DL (ref 6–8.4)
RBC # BLD AUTO: 3.48 M/UL (ref 4–5.4)
SODIUM SERPL-SCNC: 131 MMOL/L (ref 136–145)
TSH SERPL DL<=0.005 MIU/L-ACNC: 1.13 UIU/ML (ref 0.4–4)
WBC # BLD AUTO: 15.18 K/UL (ref 3.9–12.7)

## 2022-04-17 PROCEDURE — 84443 ASSAY THYROID STIM HORMONE: CPT | Performed by: STUDENT IN AN ORGANIZED HEALTH CARE EDUCATION/TRAINING PROGRAM

## 2022-04-17 PROCEDURE — 99233 PR SUBSEQUENT HOSPITAL CARE,LEVL III: ICD-10-PCS | Mod: ,,, | Performed by: HOSPITALIST

## 2022-04-17 PROCEDURE — 25000003 PHARM REV CODE 250: Performed by: HOSPITALIST

## 2022-04-17 PROCEDURE — 85025 COMPLETE CBC W/AUTO DIFF WBC: CPT | Performed by: HOSPITALIST

## 2022-04-17 PROCEDURE — 94761 N-INVAS EAR/PLS OXIMETRY MLT: CPT

## 2022-04-17 PROCEDURE — 20600001 HC STEP DOWN PRIVATE ROOM

## 2022-04-17 PROCEDURE — 99232 PR SUBSEQUENT HOSPITAL CARE,LEVL II: ICD-10-PCS | Mod: GC,,, | Performed by: INTERNAL MEDICINE

## 2022-04-17 PROCEDURE — 36415 COLL VENOUS BLD VENIPUNCTURE: CPT | Performed by: STUDENT IN AN ORGANIZED HEALTH CARE EDUCATION/TRAINING PROGRAM

## 2022-04-17 PROCEDURE — 80053 COMPREHEN METABOLIC PANEL: CPT | Performed by: HOSPITALIST

## 2022-04-17 PROCEDURE — 63600175 PHARM REV CODE 636 W HCPCS: Performed by: HOSPITALIST

## 2022-04-17 PROCEDURE — 99233 PR SUBSEQUENT HOSPITAL CARE,LEVL III: ICD-10-PCS | Mod: GC,,, | Performed by: INTERNAL MEDICINE

## 2022-04-17 PROCEDURE — 99233 SBSQ HOSP IP/OBS HIGH 50: CPT | Mod: ,,, | Performed by: HOSPITALIST

## 2022-04-17 PROCEDURE — 27000221 HC OXYGEN, UP TO 24 HOURS

## 2022-04-17 PROCEDURE — 99232 SBSQ HOSP IP/OBS MODERATE 35: CPT | Mod: GC,,, | Performed by: INTERNAL MEDICINE

## 2022-04-17 PROCEDURE — 63600175 PHARM REV CODE 636 W HCPCS: Performed by: INTERNAL MEDICINE

## 2022-04-17 PROCEDURE — C9113 INJ PANTOPRAZOLE SODIUM, VIA: HCPCS | Performed by: HOSPITALIST

## 2022-04-17 PROCEDURE — 99233 SBSQ HOSP IP/OBS HIGH 50: CPT | Mod: GC,,, | Performed by: INTERNAL MEDICINE

## 2022-04-17 RX ADMIN — FLUTICASONE FUROATE AND VILANTEROL TRIFENATATE 1 PUFF: 100; 25 POWDER RESPIRATORY (INHALATION) at 08:04

## 2022-04-17 RX ADMIN — CEFTRIAXONE 2 G: 2 INJECTION, SOLUTION INTRAVENOUS at 05:04

## 2022-04-17 RX ADMIN — ONDANSETRON 4 MG: 2 INJECTION INTRAMUSCULAR; INTRAVENOUS at 08:04

## 2022-04-17 RX ADMIN — POLYETHYLENE GLYCOL 3350 17 G: 17 POWDER, FOR SOLUTION ORAL at 08:04

## 2022-04-17 RX ADMIN — OXYCODONE HYDROCHLORIDE 10 MG: 10 TABLET ORAL at 04:04

## 2022-04-17 RX ADMIN — HYDRALAZINE HYDROCHLORIDE 25 MG: 25 TABLET, FILM COATED ORAL at 08:04

## 2022-04-17 RX ADMIN — PANTOPRAZOLE SODIUM 40 MG: 40 INJECTION, POWDER, FOR SOLUTION INTRAVENOUS at 08:04

## 2022-04-17 RX ADMIN — HYDRALAZINE HYDROCHLORIDE 25 MG: 25 TABLET, FILM COATED ORAL at 03:04

## 2022-04-17 RX ADMIN — ISOSORBIDE MONONITRATE 20 MG: 20 TABLET ORAL at 08:04

## 2022-04-17 RX ADMIN — MUPIROCIN: 20 OINTMENT TOPICAL at 08:04

## 2022-04-17 RX ADMIN — PROCHLORPERAZINE EDISYLATE 5 MG: 5 INJECTION INTRAMUSCULAR; INTRAVENOUS at 05:04

## 2022-04-17 RX ADMIN — OCTREOTIDE ACETATE 50 MCG: 50 INJECTION, SOLUTION INTRAVENOUS; SUBCUTANEOUS at 05:04

## 2022-04-17 RX ADMIN — LEVOTHYROXINE SODIUM 150 MCG: 150 TABLET ORAL at 07:04

## 2022-04-17 NOTE — ASSESSMENT & PLAN NOTE
Patient with x-ray evidence of right foot osteomyelitis s/p bedside I&D by podiatry- bone cultures sent- no path.     Bone cx 4/16 NGT      Recommendations:    1. Continue ceftriaxone IV.    2. Appreciate podiatry expertise.    3. Will follow up on cultures from the toe.

## 2022-04-17 NOTE — PROGRESS NOTES
HD complete. Report given to primary nurses.    04/16/22 2115   Vital Signs   Temp 98.7 °F (37.1 °C)   Temp src Oral   Pulse 72   Resp 13   SpO2 99 %   BP (!) 177/74   MAP (mmHg) 107   BP Location Right arm   BP Method Automatic   Patient Position Lying   Associated Signs/Symptoms hypertension   Pre-Hemodialysis Assessment   Treatment Status Completed   During Hemodialysis Assessment   Blood Flow Rate (mL/min) 200 mL/min   Dialysate Flow Rate (mL/min) 800 ml/min   Arteriovenous Lines Secure Yes   Arterial Pressure (mmHg) 0 mmHg   Venous Pressure (mmHg) 80   UF Removed (mL) 2800 mL   TMP 20   Venous Line in Air Detector Yes   Transducer Dry Yes   Access Visible Yes    notified of access issue? N/A   Heparin given? N/A   Intra-Hemodialysis Comments tx complete   Post-Hemodialysis Assessment   Rinseback Volume (mL) 250 mL   Blood Volume Processed (Liters) 67.7 L   Dialyzer Clearance Lightly streaked   Duration of Treatment (minutes) 180 minutes   Additional Fluid Intake (mL) 500 mL   Total UF (mL) 2800 mL   Net Fluid Removal 2300   Patient Response to Treatment HTN noted during tx   Arterial bleeding stop time (min) 5 min   Venous bleeding stop time (min) 5 min   Post-Hemodialysis Comments see note

## 2022-04-17 NOTE — CONSULTS
[facility] ([place] [address])  Podiatry  Consult Note    Patient Name: Erika Perera  MRN: [id number]  Admission Date: 4/14/2022  Hospital Length of Stay: 2 days  Attending Physician: Ila Garcia MD  Primary Care Provider: Minor Bennett MD     Inpatient consult to Podiatry  Consult performed by: William Baron DPM  Consult ordered by: Ila Garcia MD        Subjective:     History of Present Illness:  82 year old woman presenting from ophthalmology clinic for evaluation of 2 weeks of progressive L perioccular swelling, L perioccular pain, and worsening vision of the L eye. She has a history of T2DM, ESRD(MWF), HTN, HLD. She has associated chills and night sweats that began during this time as well. She denies any pain. She notes that she noticed the ulcer at the tip of her R great toe several weeks ago. She denies any pain, drainage, mal odor, redness, or swelling to the right foot. Patient notes she does not see a podiatrist outpatient and does not wear diabetic shoes. Podiatry was consulted for osteomyelitis of right great toe.           Scheduled Meds:   sodium chloride 0.9%   Intravenous Once    carvediloL  3.125 mg Oral BID WM    cefTRIAXone (ROCEPHIN) IVPB  2 g Intravenous Q12H    epoetin maximilian-epbx  50 Units/kg Intravenous Every Mon, Wed, Fri    fluticasone furoate-vilanteroL  1 puff Inhalation Daily    hydrALAZINE  25 mg Oral TID    isosorbide mononitrate  20 mg Oral BID    levothyroxine  150 mcg Oral Daily    mupirocin   Nasal BID    octreotide  50 mcg Subcutaneous Q8H    pantoprozole (PROTONIX) IV  40 mg Intravenous Q12H    polyethylene glycol  17 g Oral Daily     Continuous Infusions:   dextrose 10 % in water (D10W) 50 mL/hr at 04/16/22 1007     PRN Meds:sodium chloride, sodium chloride 0.9%, acetaminophen, albuterol-ipratropium, dextrose 10%, dextrose 10%, heparin (porcine), insulin aspart U-100, melatonin, naloxone, ondansetron, oxyCODONE, oxyCODONE,  prochlorperazine, sodium chloride 0.9%, sodium chloride 0.9%    Review of patient's allergies indicates:   Allergen Reactions    Amlodipine Other (See Comments)    Atorvastatin Other (See Comments)    Nebivolol Other (See Comments)        Past Medical History:   Diagnosis Date    DM2 (diabetes mellitus, type 2) 4/14/2022    HTN (hypertension) 4/14/2022     Past Surgical History:   Procedure Laterality Date    CATARACT EXTRACTION Bilateral        Family History       Problem Relation (Age of Onset)    No Known Problems Mother, Father, Sister, Brother, Maternal Aunt, Maternal Uncle, Paternal Aunt, Paternal Uncle, Maternal Grandmother, Maternal Grandfather, Paternal Grandmother, Paternal Grandfather          Tobacco Use    Smoking status: Never Smoker    Smokeless tobacco: Never Used   Substance and Sexual Activity    Alcohol use: Not on file    Drug use: Not on file    Sexual activity: Not on file     Review of Systems   Constitutional:  Positive for activity change. Negative for fever.   Eyes:  Positive for pain and visual disturbance.   Respiratory:  Negative for shortness of breath.    Cardiovascular:  Positive for leg swelling. Negative for chest pain.   Gastrointestinal:  Positive for constipation. Negative for abdominal pain, nausea and vomiting.   Skin:  Positive for wound.   Neurological:  Positive for headaches. Negative for dizziness.   Objective:     Vital Signs (Most Recent):  Temp: 97.8 °F (36.6 °C) (04/16/22 1423)  Pulse: (!) 59 (04/16/22 1554)  Resp: 16 (04/16/22 1724)  BP: 133/62 (04/16/22 1554)  SpO2: 97 % (04/16/22 1554)   Vital Signs (24h Range):  Temp:  [97.2 °F (36.2 °C)-99.3 °F (37.4 °C)] 97.8 °F (36.6 °C)  Pulse:  [48-59] 59  Resp:  [12-21] 16  SpO2:  [90 %-100 %] 97 %  BP: (125-156)/(60-92) 133/62     Weight: 81.6 kg (180 lb)  Body mass index is 29.95 kg/m².    Foot Exam    Right Foot/Ankle     Inspection and Palpation  Ecchymosis: none  Tenderness: none   Swelling: none   Skin Exam:  skin changes and ulcer; no blister, no callus, no drainage, no dry skin, skin not intact, no maceration, no tinea, no cellulitis, no corn, no fissure, no abnormal color, no erythema and no warts     Neurovascular  Dorsalis pedis: 1+  Posterior tibial: 1+  Saphenous nerve sensation: diminished  Tibial nerve sensation: diminished  Superficial peroneal nerve sensation: diminished  Deep peroneal nerve sensation: diminished  Sural nerve sensation: diminished      Left Foot/Ankle      Inspection and Palpation  Ecchymosis: none  Tenderness: none   Swelling: none   Skin Exam: skin intact; no blister, no callus, no drainage, no dry skin, no maceration, no tinea, no cellulitis, no corn, no fissure, no skin changes, no abnormal color, no ulcer, no erythema and no warts     Neurovascular  Dorsalis pedis: 1+  Posterior tibial: 1+  Saphenous nerve sensation: diminished  Tibial nerve sensation: diminished  Superficial peroneal nerve sensation: diminished  Deep peroneal nerve sensation: diminished  Sural nerve sensation: diminished      Laboratory:  CBC:   Recent Labs   Lab 04/16/22 0357   WBC 15.22*   RBC 2.53*   HGB 7.2*   HCT 24.2*      MCV 96   MCH 28.5   MCHC 29.8*     CMP:   Recent Labs   Lab 04/16/22 0357   *   CALCIUM 8.1*   *   K 4.1   CO2 25   CL 94*   BUN 50*   CREATININE 6.1*     CRP: No results for input(s): CRP in the last 168 hours.  ESR: No results for input(s): SEDRATE in the last 168 hours.  All pertinent labs reviewed within the last 24 hours.    Diagnostic Results:  I have reviewed all pertinent imaging results/findings within the past 24 hours.    X-Ray Foot 2 View Right [[id number]] (Abnormal) Resulted: 04/15/22 1526   Order Status: Completed Updated: 04/15/22 1529   Narrative:     EXAMINATION:   XR FOOT 2 VIEW RIGHT     CLINICAL HISTORY:   infected right 1st toe;     TECHNIQUE:   AP and lateral radiographs of the right foot were performed.     COMPARISON:   None     FINDINGS:   There is  soft tissue swelling of the 1st toe.  There is regional osteopenia of the 1st distal phalanx with erosive changes of the tuft and along the lateral aspect of the base.     Diffuse osteopenia.  No acute fractures.  Scattered degenerative changes.  Pes planus.  Mild Achilles enthesopathy.  Plantar calcaneal spur.  Vascular calcifications.    Impression:       Osteomyelitis of the 1st distal phalanx.     This report was flagged in Epic as abnormal.       Electronically signed by: Sánchez Dennison   Date: 04/15/2022        Clinical Findings:  Stable full thickness ulceration at the distal aspect of the right great toe, with overlying hyperkeratotic cap prior to debridement. No clinical signs of infection.     Post debridement measurements 0.8 x 0.6 x 0.2 cm   Wound base 85% granular, 15% fibrotic, 0% necrotic  Hyperkeratotic periwound           Assessment/Plan:     Subacute osteomyelitis of right foot  IDSA Moderate diabetic foot infection with involvement of bone. Right foot xray suggestive of osteomyelitis of the distal phalanx at the right great toe. Neuropathic ulceration at the tip of the R great toe without cardinal signs of infection.     - Patient consented to bedside excisional debridement and bone biopsy, preformed   - Bone biopsy obtained and sent for micro    - Recommend consultation to ID after bone biopsy speciates for 6 weeks tailored ABx therapy   - Continue IV ABx per ID  - Discussed possibility of partial/complete R great toe amputation if long term ABx therapy fails   - WB: No restrictions   - Nursing wound care routine ordered   - Cleansed with vashe, dressed with iodosorb and mepilex boarder    - Podiatry will follow         Thank you for your consult. I will follow-up with patient. Please contact us if you have any additional questions.    William Baron DPM  Podiatry  [facility] ([place]

## 2022-04-17 NOTE — ASSESSMENT & PLAN NOTE
-- Opthalmology consulted and has seen patient; plan for surgical management next week.   -- CT orbits shows minimal asymmetric enhancement at the periphery of the left globe compared to the right could represent a mild inflammatory or infectious process  - Noted to have recent Klebsiella pneumoniae bacteremia treated with cipro, potential source?. Repeat blood cultures ordered.   - Abx per ID; currently receiving ceftriaxone

## 2022-04-17 NOTE — ASSESSMENT & PLAN NOTE
Key History and Diagnostic Findings  - On levothyroxine 150 mcg daily at home  - TSH 1.13 on 04/17/2022  - Weight based dose: 82 kg x 1.6 = 130    Plan  - Decrease levothyroxine from 150 down to 137 mcg daily  - Recheck TFTs in 8 weeks  - Goal TSH in elderly with multiple comorbidities: 4-7  - Avoid hyperthyroidism due to Cardiovascular disease, rate/rhythm abnormalities, and bone effects  - Ensure proper administration

## 2022-04-17 NOTE — SUBJECTIVE & OBJECTIVE
"Interval HPI:   Overnight events:  No acute events reported overnight  Eating:   <25%  Nausea: No  Hypoglycemia and intervention: No  Fever: No  TPN and/or TF: No  If yes, type of TF/TPN and rate: NA    BP (!) 173/74   Pulse (!) 57   Temp 98.3 °F (36.8 °C) (Oral)   Resp 16   Ht 5' 5" (1.651 m)   Wt 81.6 kg (180 lb)   SpO2 97%   BMI 29.95 kg/m²     Labs Reviewed and Include    Recent Labs   Lab 04/17/22  0400   *   CALCIUM 8.6*   ALBUMIN 2.0*   PROT 6.5   *   K 4.7   CO2 21*   CL 97   BUN 26*   CREATININE 3.9*   ALKPHOS 112   ALT 9*   AST 43*   BILITOT 1.5*     Lab Results   Component Value Date    WBC 15.18 (H) 04/17/2022    HGB 10.3 (L) 04/17/2022    HCT 31.7 (L) 04/17/2022    MCV 91 04/17/2022     04/17/2022     Recent Labs   Lab 04/17/22  0400   TSH 1.126     Lab Results   Component Value Date    HGBA1C 5.0 04/14/2022       Nutritional status:   Body mass index is 29.95 kg/m².  Lab Results   Component Value Date    ALBUMIN 2.0 (L) 04/17/2022     No results found for: PREALBUMIN    Estimated Creatinine Clearance: 11.7 mL/min (A) (based on SCr of 3.9 mg/dL (H)).    Accu-Checks  Recent Labs     04/16/22  0758 04/16/22  1004 04/16/22  1227 04/16/22  1415 04/16/22  1811 04/16/22  2205 04/17/22  0045 04/17/22  0212 04/17/22  0420 04/17/22  0632   POCTGLUCOSE 68* 133* 131* 123* 139* 127* 263* 253* 245* 255*       Current Medications and/or Treatments Impacting Glycemic Control  Immunotherapy:    Immunosuppressants       None          Steroids:   Hormones (From admission, onward)                Start     Stop Route Frequency Ordered    04/14/22 2041  melatonin tablet 6 mg         -- Oral Nightly PRN 04/14/22 1942          Pressors:    Autonomic Drugs (From admission, onward)                None          Hyperglycemia/Diabetes Medications:   Antihyperglycemics (From admission, onward)                Start     Stop Route Frequency Ordered    04/14/22 0315  insulin aspart U-100 pen 0-5 Units      "    -- SubQ Before meals & nightly PRN 04/14/22 9626

## 2022-04-17 NOTE — PROGRESS NOTES
"Wound Debridement    Date/Time: 4/14/2022 3:48 PM  Performed by: Syl Bowman DPM  Authorized by: Syl Bowman DPM     Time out: Immediately prior to procedure a "time out" was called to verify the correct patient, procedure, equipment, support staff and site/side marked as required.    Consent Done?:  Yes (Verbal)    Preparation: Patient was prepped and draped in usual sterile fashion    Local anesthesia used?: No      Wound Details:    Location:  Right foot    Location:  Right 1st Toe    Type of Debridement:  Excisional       Length (cm):  0.8       Area (sq cm):  0.48       Width (cm):  0.6       Percent Debrided (%):  100       Depth (cm):  0.2       Total Area Debrided (sq cm):  0.48    Depth of debridement:  Bone    Tissue debrided:  Fascia, Epidermis, Dermis, Subcutaneous and Bone    Devitalized tissue debrided:  Fibrin, Slough, Clots, Callus and Biofilm    Instruments:  Scissors, Forceps and Blade    Bleeding:  Minimal  Hemostasis Achieved: Yes    Method Used:  Pressure  Patient tolerance:  Patient tolerated the procedure well with no immediate complications        "

## 2022-04-17 NOTE — SUBJECTIVE & OBJECTIVE
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 Negative for appetite change, chills and fever.   HENT:  Negative for congestion.    Eyes:  Positive for pain and visual disturbance (left eye). Negative for itching.   Respiratory:  Negative for cough, chest tightness and shortness of breath.    Cardiovascular:  Negative for palpitations and leg swelling.   Gastrointestinal:  Positive for nausea. Negative for abdominal pain and diarrhea.   Endocrine: Negative for polyphagia and polyuria.   Genitourinary:  Negative for dysuria and frequency.   Musculoskeletal:  Negative for back pain.   Neurological:  Negative for numbness and headaches.   Psychiatric/Behavioral:  Negative for agitation and behavioral problems.    Objective:     Vital Signs (Most Recent):  Temp: 98.3 °F (36.8 °C) (04/17/22 0745)  Pulse: (!) 54 (04/17/22 1128)  Resp: 16 (04/17/22 0838)  BP: (!) 177/74 (04/17/22 0744)  SpO2: 96 % (04/17/22 1128)   Vital Signs (24h Range):  Temp:  [96.5 °F (35.8 °C)-99.3 °F (37.4 °C)] 98.3 °F (36.8 °C)  Pulse:  [54-73] 54  Resp:  [10-27] 16  SpO2:  [95 %-100 %] 96 %  BP: (133-189)/(61-92) 177/74     Weight: 81.6 kg (180 lb)  Body mass index is 29.95 kg/m².    Estimated Creatinine Clearance: 11.7 mL/min (A) (based on SCr of 3.9 mg/dL (H)).    Physical Exam  Constitutional:       Appearance: She is well-developed.   HENT:      Head: Normocephalic.   Eyes:      General:         Left eye: Discharge present.     Conjunctiva/sclera:      Left eye: Chemosis present.   Cardiovascular:      Rate and Rhythm: Normal rate and regular rhythm.      Heart sounds: Normal heart sounds. No murmur heard.  Pulmonary:      Effort: Pulmonary effort is normal.      Breath sounds: Normal breath sounds.   Abdominal:      General: Bowel sounds are normal. There is no distension.      Palpations: Abdomen is soft.      Tenderness: There is no abdominal  tenderness.   Musculoskeletal:         General: Normal range of motion.   Neurological:      Mental Status: She is alert and oriented to person, place, and time.   Psychiatric:         Behavior: Behavior normal.       Significant Labs:   Microbiology Results (last 7 days)       Procedure Component Value Units Date/Time    Culture, Anaerobe [[id number]] Collected: 04/16/22 1112    Order Status: Completed Specimen: Bone from Toe, Right Foot Updated: 04/17/22 0756     Anaerobic Culture Culture in progress    Aerobic culture [[id number]] Collected: 04/16/22 1112    Order Status: Completed Specimen: Bone from Toe, Right Foot Updated: 04/17/22 0726     Aerobic Bacterial Culture No growth    Blood culture #2 **CANNOT BE ORDERED STAT** [[id number]] Collected: 04/14/22 1729    Order Status: Completed Specimen: Blood from Peripheral, Forearm, Right Updated: 04/16/22 2012     Blood Culture, Routine No Growth to date      No Growth to date      No Growth to date    Blood culture #1 **CANNOT BE ORDERED STAT** [[id number]] Collected: 04/14/22 1728    Order Status: Completed Specimen: Blood from Peripheral, Forearm, Right Updated: 04/16/22 2012     Blood Culture, Routine No Growth to date      No Growth to date      No Growth to date            Significant Imaging: I have reviewed all pertinent imaging results/findings within the past 24 hours.

## 2022-04-17 NOTE — ASSESSMENT & PLAN NOTE
81 y/o female with a hx of ESRD on HD MWF vis AV LUE, CAD, HF, HTN, DM2, and recent admission to OSH 4/5-4/8 for Klebsiella pneumoniae bacteremia thought to be 2/2 UTI. Culture results show pansensitive klebsiella, and the patient was discharged on PO ciprofloxacin. Now with eye swelling and vision changes for 1-2 weeks.  Seen by ophthalmology with concern for endophthalmitis - CT orbit with minimal asymmetric enhancement at the periphery of the left globe compared to the right could represent a mild inflammatory or infectious process. Due to lack of trauma and wounds to the eye, expect hematogenous.  Source of her bacteremia could be urinary vs osteomyelitis of toe.  Klebsiella pneumoniae is also known to cause liver abscesses as well.  U/S abdomen with ?right adrenal mass    Recommendations:    1. Continue IV CTX 2 g IV q 12.  2. Follow up on blood cultures.  3. Appreciate ophthalmology input, ( evisceration vs enucleation)

## 2022-04-17 NOTE — ASSESSMENT & PLAN NOTE
Key History and Diagnostic Findings  - Secondary to glimepiride in the setting of ESRD worsened by poor oral intake  - May take several days for glimepiride to clear from her system  - C-peptide elevated at 14 indicating endogenous insulin production responsible for hypoglycemia; expected with glimepiride and ESRD  - 24 hour glucose trend:  Sustained while on dextrose drip, increased into the low 200s since midnight; dextrose drip discontinued around 7:00 a.m.    Plan  - Agree with holding all diabetes medications at this time and discontinuing D5 drip  - Await sulfonyurea screen to confirm presence of glimepiride (remains in process this morning)  - If serum glucose remains above 200, utilize low correction scale aspart insulin

## 2022-04-17 NOTE — ASSESSMENT & PLAN NOTE
- patient went to [facility] for pedicure two weeks ago and developed ulcer right first toe  - XRAY demonstrates evidence of osteomyelitis   - Continue abx coverage  - consult podiatry -- bedside debridement  - follow up cx and path

## 2022-04-17 NOTE — PROGRESS NOTES
[facility] ([address] Medicine  Progress Note    Patient Name: Erika Perera  MRN: [id number]  Patient Class: IP- Inpatient   Admission Date: 4/14/2022  Length of Stay: 3 days  Attending Physician: Ila Garcia MD  Primary Care Provider: Minor Bennett MD        Subjective:     Principal Problem:Endophthalmitis        HPI:  81 yo F with PMHx ESRD, CAD, chronic diastolic heart failure, HTN, DM2, and HLD who presents to the ED from ophthalmology clinic for L eye pain and swelling x 1 week. Pt. Reports that she has had progressively worsening L eye pain and vision loss over the course of the last week. Pt. Reports developing floaters and slowly worsening vision loss with swelling of her upper and lower eyelids. Pt. States that she has developed worsening pain in addition to the swelling over the last couple of days which prompted her to schedule an appointment with optometry. Pt. Seen in optometry clinic and was referred to ophthalmology clinic today over concerns for endophthalmitis. She was then sent to the ED for CT orbits and admit for likely need for enucleation and evisceration due to the severity of the disease. Of note, patient was recently admitted to [facility] 4/5-/48 for Klebsiella pneumoniae bacteremia thought to be 2/2 UTI. Culture results show pansensitive klebsiella, and the patient was discharged on PO ciprofloxacin and reports compliance with the regimen. Pt. Denies any fevers, chills, eye discharge, or redness.      Overview/Hospital Course:  Patient admitted to hospital service.  Ophthalmology, ID, nephrology consulted.  Started on ceftriaxone. Blood cx collected.  CT scan demonstrated bilateral exophthalmos and inflammatory changes involving left globe.  During admission, patient noted hypoglycemic.  She has had issues at home with the same.  She takes sulfonylurea at home and is a HD patient.  Started on D10gtt, SQ octreotide, glucagon, regular renal diet,  q2hr accuchecks.  Also noted to be anemic on admission thought to be secondary to ESRD and acute infection.  Nurse noted maroon stools.  Patient received total of 3U PRBC.  GI consulted and recommended outpatient colonoscopy as well as stool softner and increase fiber diet.  Placed on IV PPI with serial Hb.  Patient noted to have new right first toe ulcer.  Xray indicated osteomyelitis.  Podiatry consulted and performed bedside debridement.  Specimen sent to micro and path.        Interval History: No acute evens overnight.  Remains afebrile.  Blood glucose levels improving.  Denies HA and left eye pain today.  She does complain of nausea.  No abdominal pain or vomiting.  No diarrhea.      Review of Systems   Constitutional:  Positive for appetite change and fatigue. Negative for fever.   Eyes:  Positive for discharge. Negative for pain.   Respiratory:  Negative for cough and shortness of breath.    Cardiovascular:  Negative for chest pain.   Gastrointestinal:  Positive for nausea. Negative for abdominal pain, diarrhea and vomiting.   Genitourinary:         Anuric    Musculoskeletal:  Negative for back pain and myalgias.   Neurological:  Negative for headaches.   Objective:     Vital Signs (Most Recent):  Temp: 98.3 °F (36.8 °C) (04/17/22 0745)  Pulse: (!) 54 (04/17/22 1128)  Resp: 16 (04/17/22 0838)  BP: (!) 177/74 (04/17/22 0744)  SpO2: 96 % (04/17/22 1128)   Vital Signs (24h Range):  Temp:  [96.5 °F (35.8 °C)-98.7 °F (37.1 °C)] 98.3 °F (36.8 °C)  Pulse:  [54-73] 54  Resp:  [10-27] 16  SpO2:  [95 %-100 %] 96 %  BP: (133-189)/(61-92) 177/74     Weight: 81.6 kg (180 lb)  Body mass index is 29.95 kg/m².    Intake/Output Summary (Last 24 hours) at 4/17/2022 1341  Last data filed at 4/16/2022 2115  Gross per 24 hour   Intake 1412 ml   Output 2800 ml   Net -1388 ml      Physical Exam  Constitutional:       General: She is not in acute distress.     Appearance: Normal appearance. She is not ill-appearing.   HENT:       Head: Normocephalic and atraumatic.      Nose: Nose normal.   Eyes:      General:         Left eye: Discharge present.     Comments: Bilateral exophthalmos  Left hypopyon   Cannot visualize iris/cornea  +left ptosis      Cardiovascular:      Rate and Rhythm: Normal rate and regular rhythm.   Pulmonary:      Effort: Pulmonary effort is normal.      Breath sounds: Normal breath sounds.   Abdominal:      General: Bowel sounds are normal. There is no distension.      Palpations: Abdomen is soft.      Tenderness: There is no abdominal tenderness.   Musculoskeletal:         General: No deformity. Normal range of motion.   Skin:     General: Skin is warm and dry.      Findings: No erythema.   Neurological:      General: No focal deficit present.      Mental Status: She is alert and oriented to person, place, and time.   Psychiatric:         Mood and Affect: Mood normal.         Behavior: Behavior normal.       Significant Labs: All pertinent labs within the past 24 hours have been reviewed.    Significant Imaging: I have reviewed all pertinent imaging results/findings within the past 24 hours.      Assessment/Plan:      * Endophthalmitis  -- Opthalmology consulted and has seen patient; plan for surgical management next week.   -- CT orbits shows minimal asymmetric enhancement at the periphery of the left globe compared to the right could represent a mild inflammatory or infectious process  - Noted to have recent Klebsiella pneumoniae bacteremia treated with cipro, potential source?. Repeat blood cultures ordered.   - Abx per ID; currently receiving ceftriaxone     Hypoglycemia  - secondary to sulfonylurea and active infection   - SQ octreotide, D10, Glucagon, regular diet    - transferring to higher level of care for q2hr accuchecks   - 4/17- finally improving    Gastrointestinal bleeding  - IV PPI bid  - serial Hb/Hct  - transfuse total 3U PRBC  - stop ASA and prophylactic heparin   - GI consulted-- recommended stool  softner, high fiber, outpatient colon  - NOTE patient was recently started on ticagrelor at another hospital. This is being held as well.     Subacute osteomyelitis of right foot  - patient [facility] hospitals [facility] two weeks ago and developed ulcer right first toe  - XRAY demonstrates evidence of osteomyelitis   - Continue abx coverage  - consult podiatry -- bedside debridement  - follow up cx and path      ESRD (end stage renal disease)  - No acue issues or need for urgent HD, nephrology consulted for regular HD while admitted      DM2 (diabetes mellitus, type 2)  -A1c ordered and pending  - Hold diabetes medication secondary to hypoglycemia  - discontinue glimepiride indefinitely       Secondary hypothyroidism  - restart home dose levothyroxine  - TSH WNL    Bacteremia due to Klebsiella pneumoniae  -Recently diagnosed based on blood cultures done at [facility] 4/5, pansensitive  -Suspect bacteremia may be cause of Endogenous bacterial endophthalmitis, repeat blood cultures ordered  - Consult ID        HTN (hypertension)  -Continue home coreg, hydralazine, and isosorbide    CAD (coronary artery disease)  -No complaints of chest pain, baseline EKG ordered. TTE ordered to rule out vegetetation in setting of bacteremia with concern for endogenous bacterial endophthalmitis  -Continue home statin        VTE Risk Mitigation (From admission, onward)         Ordered     heparin (porcine) injection 1,000 Units  As needed (PRN)         04/15/22 0757     IP VTE HIGH RISK PATIENT  Once         04/14/22 1942     Place sequential compression device  Until discontinued         04/14/22 1942                Discharge Planning   ROM: 4/19/2022     Code Status: Full Code   Is the patient medically ready for discharge?: No    Reason for patient still in hospital (select all that apply): Treatment                     Ila Garcia MD  Department of Hospital Medicine   [facility] - Intensive Care ([facility]-)

## 2022-04-17 NOTE — PROGRESS NOTES
[facility] - [facility] ([place] [id number])  Infectious Disease  Progress Note    Patient Name: Erika Perera  MRN: [id number]  Admission Date: 4/14/2022  Length of Stay: 2 days  Attending Physician: Ila Garcia MD  Primary Care Provider: Minor Bennett MD    Isolation Status: No active isolations  Assessment/Plan:      * Endophthalmitis  81 y/o female with a hx of ESRD on HD MWF vis AV LUE, CAD, HF, HTN, DM2, and recent admission to OSH 4/5-4/8 for Klebsiella pneumoniae bacteremia thought to be 2/2 UTI. Culture results show pansensitive klebsiella, and the patient was discharged on PO ciprofloxacin. Now with eye swelling and vision changes for 1-2 weeks.  Seen by ophthalmology with concern for endophthalmitis - CT orbit with minimal asymmetric enhancement at the periphery of the left globe compared to the right could represent a mild inflammatory or infectious process. Due to lack of trauma and wounds to the eye, expect hematogenous.  Source of her bacteremia could be urinary vs osteomyelitis of toe.  Klebsiella pneumoniae is also known to cause liver abscesses as well.      Recommendations:    1. Continue IV CTX 2 g IV q 12.  2. Follow up abdominal ultrasound.  3. Appreciate ophthalmology input.  4. Follow up on blood cultures.    Subacute osteomyelitis of right foot  Patient with x-ray evidence of right foot osteomyelitis.  Might be secondary to Klebsiella pneumoniae.  Would need a bone biopsy with culture and pathology to fully assess.    Plan    1. Continue ceftriaxone IV.    2. Appreciate podiatry expertise.    3. Will follow up on cultures from the toe.                Anticipated Disposition: TBD    Thank you for your consult. I will follow-up with patient. Please contact us if you have any additional questions.    Juan Pablo Mata MD  Infectious Disease  [facility] - Intensive Care ([place] [id number])    Subjective:     Principal Problem:Endophthalmitis    HPI: Erika Perera is an 81 y/o female  with a hx of ESRD on HD MWF vis AV LUE, CAD, HF, HTN, DM2, and HLD who presents to the ED from ophthalmology clinic for L eye pain and swelling x 1 week.     Patient was recently admitted to OSH 4/5-4/8 for Klebsiella pneumoniae bacteremia thought to be 2/2 UTI. Culture results show pansensitive klebsiella, and the patient was discharged on PO ciprofloxacin and reports compliance with the regimen. She was Seen in optometry clinic and was referred to ophthalmology clinic 04/14 over concerns for endophthalmitis. She was then sent to the ED for CT orbits and admit for likely need for enucleation and evisceration due to the severity of the disease.    She has been having worsening vision and eye swelling for 1 week, denies any fever chills, N/V abdominal pain or diarrhea. She denies any eye truama.      Interval History:     WBC count elevated this morning.    Review of Systems   Constitutional:  Positive for activity change. Negative for appetite change, chills and fever.   HENT:  Negative for congestion.    Eyes:  Positive for pain and visual disturbance (left eye). Negative for itching.   Respiratory:  Negative for cough, chest tightness and shortness of breath.    Cardiovascular:  Negative for palpitations and leg swelling.   Gastrointestinal:  Negative for abdominal pain and nausea.   Endocrine: Negative for polyphagia and polyuria.   Genitourinary:  Negative for dysuria and frequency.   Musculoskeletal:  Negative for back pain.   Neurological:  Negative for numbness and headaches.   Psychiatric/Behavioral:  Negative for agitation and behavioral problems.    Objective:     Vital Signs (Most Recent):  Temp: 97.5 °F (36.4 °C) (04/16/22 0434)  Pulse: (!) 48 (04/16/22 0712)  Resp: 12 (04/16/22 0712)  BP: 138/64 (04/16/22 0434)  SpO2: 100 % (04/16/22 0712)   Vital Signs (24h Range):  Temp:  [97.2 °F (36.2 °C)-99.7 °F (37.6 °C)] 97.5 °F (36.4 °C)  Pulse:  [48-62] 48  Resp:  [12-20] 12  SpO2:  [94 %-100 %] 100 %  BP:  (125-163)/(60-94) 138/64     Weight: 81.6 kg (180 lb)  Body mass index is 29.95 kg/m².    Estimated Creatinine Clearance: 7.5 mL/min (A) (based on SCr of 6.1 mg/dL (H)).    Physical Exam  Constitutional:       Appearance: She is well-developed.   HENT:      Head: Normocephalic.   Eyes:      General:         Left eye: Discharge present.     Conjunctiva/sclera:      Left eye: Chemosis present.   Cardiovascular:      Rate and Rhythm: Normal rate and regular rhythm.      Heart sounds: Normal heart sounds. No murmur heard.  Pulmonary:      Effort: Pulmonary effort is normal.      Breath sounds: Normal breath sounds.   Abdominal:      General: Bowel sounds are normal. There is no distension.      Palpations: Abdomen is soft.      Tenderness: There is no abdominal tenderness.   Musculoskeletal:         General: Normal range of motion.   Neurological:      Mental Status: She is alert and oriented to person, place, and time.   Psychiatric:         Behavior: Behavior normal.       Significant Labs:   Microbiology Results (last 7 days)       Procedure Component Value Units Date/Time    Blood culture #1 **CANNOT BE ORDERED STAT** [[id number]] Collected: 04/14/22 1728    Order Status: Completed Specimen: Blood from Peripheral, Forearm, Right Updated: 04/15/22 2012     Blood Culture, Routine No Growth to date      No Growth to date    Blood culture #2 **CANNOT BE ORDERED STAT** [[id number]] Collected: 04/14/22 1729    Order Status: Completed Specimen: Blood from Peripheral, Forearm, Right Updated: 04/15/22 2012     Blood Culture, Routine No Growth to date      No Growth to date            Significant Imaging: I have reviewed all pertinent imaging results/findings within the past 24 hours.

## 2022-04-17 NOTE — SUBJECTIVE & OBJECTIVE
Interval History: No acute evens overnight.  Remains afebrile.  Blood glucose levels improving.  Denies HA and left eye pain today.  She does complain of nausea.  No abdominal pain or vomiting.  No diarrhea.      Review of Systems   Constitutional:  Positive for appetite change and fatigue. Negative for fever.   Eyes:  Positive for discharge. Negative for pain.   Respiratory:  Negative for cough and shortness of breath.    Cardiovascular:  Negative for chest pain.   Gastrointestinal:  Positive for nausea. Negative for abdominal pain, diarrhea and vomiting.   Genitourinary:         Anuric    Musculoskeletal:  Negative for back pain and myalgias.   Neurological:  Negative for headaches.   Objective:     Vital Signs (Most Recent):  Temp: 98.3 °F (36.8 °C) (04/17/22 0745)  Pulse: (!) 54 (04/17/22 1128)  Resp: 16 (04/17/22 0838)  BP: (!) 177/74 (04/17/22 0744)  SpO2: 96 % (04/17/22 1128)   Vital Signs (24h Range):  Temp:  [96.5 °F (35.8 °C)-98.7 °F (37.1 °C)] 98.3 °F (36.8 °C)  Pulse:  [54-73] 54  Resp:  [10-27] 16  SpO2:  [95 %-100 %] 96 %  BP: (133-189)/(61-92) 177/74     Weight: 81.6 kg (180 lb)  Body mass index is 29.95 kg/m².    Intake/Output Summary (Last 24 hours) at 4/17/2022 1341  Last data filed at 4/16/2022 2115  Gross per 24 hour   Intake 1412 ml   Output 2800 ml   Net -1388 ml      Physical Exam  Constitutional:       General: She is not in acute distress.     Appearance: Normal appearance. She is not ill-appearing.   HENT:      Head: Normocephalic and atraumatic.      Nose: Nose normal.   Eyes:      General:         Left eye: Discharge present.     Comments: Bilateral exophthalmos  Left hypopyon   Cannot visualize iris/cornea  +left ptosis      Cardiovascular:      Rate and Rhythm: Normal rate and regular rhythm.   Pulmonary:      Effort: Pulmonary effort is normal.      Breath sounds: Normal breath sounds.   Abdominal:      General: Bowel sounds are normal. There is no distension.      Palpations: Abdomen  is soft.      Tenderness: There is no abdominal tenderness.   Musculoskeletal:         General: No deformity. Normal range of motion.   Skin:     General: Skin is warm and dry.      Findings: No erythema.   Neurological:      General: No focal deficit present.      Mental Status: She is alert and oriented to person, place, and time.   Psychiatric:         Mood and Affect: Mood normal.         Behavior: Behavior normal.       Significant Labs: All pertinent labs within the past 24 hours have been reviewed.    Significant Imaging: I have reviewed all pertinent imaging results/findings within the past 24 hours.

## 2022-04-17 NOTE — ASSESSMENT & PLAN NOTE
- IV PPI bid  - serial Hb/Hct  - transfuse total 3U PRBC  - stop ASA and prophylactic heparin   - GI consulted-- recommended stool softner, high fiber, outpatient colon  - NOTE patient was recently started on ticagrelor at another hospital. This is being held as well.

## 2022-04-17 NOTE — ASSESSMENT & PLAN NOTE
- secondary to sulfonylurea and active infection   - SQ octreotide, D10, Glucagon, regular diet    - transferring to higher level of care for q2hr accuchecks   - 4/17- finally improving

## 2022-04-17 NOTE — PROGRESS NOTES
[facility] - [facility] ([place] [address])  Endocrinology  Progress Note    Admit Date: 4/14/2022     82 year old  female with T2DM, ESRD (on HD since November 2021), hypothyroidism, CAD, chronic diastolic heart failure, HTN, and HLD who presented to the ED from her ophthalmologist's clinic for evaluation of left eye endophthalmitis.  Patient reports progressively worsening left eye pain and vision loss with swelling of the upper and lower eyelids over the week prior to admission.  She was seen first in optometry clinic and was referred to ophthalmology the day of admission for concerns for endophthalmitis. She was then sent to the ED for CT orbits and admission for likely need for enucleation and evisceration due to the severity of the disease. Of note, patient was recently admitted to [facility] 04/05/2022 through 04/08/2022 for Klebsiella pneumoniae bacteremia thought to be secondary to UTI. Patient was discharged on oral ciprofloxacin and reports compliance with the regimen. She denies any fevers or chills.    - Endocrinology consulted for evaluation of hypoglycemia    Regarding Diabetes Mellitus:    - Initially diagnosed with Type 2 diabetes mellitus:  Over 15 years ago per son  - Home diabetic medications include: Glimepiride 4 mg daily though does not take it daily, last dose was reportedly 1 week prior to admission per son  - A1c 5.0 on 04/14/2022 from 5.5 on 04/06/2022 likely indicating frequent hypoglycemia in this 82-year-old female.  She reports hypoglycemia was never an issue until hemodialysis sessions started late last year  - Known diabetic complications: nephropathy, retinopathy, peripheral neuropathy, and cardiovascular disease  - Cardiovascular risk factors: advanced age (older than 55 for men, 65 for women), diabetes mellitus, dyslipidemia, family history of premature cardiovascular disease, and hypertension      Interval HPI:   Overnight events:  No acute events reported  "overnight  Eating:   <25%  Nausea: No  Hypoglycemia and intervention: No  Fever: No  TPN and/or TF: No  If yes, type of TF/TPN and rate: NA    BP (!) 173/74   Pulse (!) 57   Temp 98.3 °F (36.8 °C) (Oral)   Resp 16   Ht 5' 5" (1.651 m)   Wt 81.6 kg (180 lb)   SpO2 97%   BMI 29.95 kg/m²     Labs Reviewed and Include    Recent Labs   Lab 04/17/22  0400   *   CALCIUM 8.6*   ALBUMIN 2.0*   PROT 6.5   *   K 4.7   CO2 21*   CL 97   BUN 26*   CREATININE 3.9*   ALKPHOS 112   ALT 9*   AST 43*   BILITOT 1.5*     Lab Results   Component Value Date    WBC 15.18 (H) 04/17/2022    HGB 10.3 (L) 04/17/2022    HCT 31.7 (L) 04/17/2022    MCV 91 04/17/2022     04/17/2022     Recent Labs   Lab 04/17/22  0400   TSH 1.126     Lab Results   Component Value Date    HGBA1C 5.0 04/14/2022       Nutritional status:   Body mass index is 29.95 kg/m².  Lab Results   Component Value Date    ALBUMIN 2.0 (L) 04/17/2022     No results found for: PREALBUMIN    Estimated Creatinine Clearance: 11.7 mL/min (A) (based on SCr of 3.9 mg/dL (H)).    Accu-Checks  Recent Labs     04/16/22  0758 04/16/22  1004 04/16/22  1227 04/16/22  1415 04/16/22  1811 04/16/22  2205 04/17/22  0045 04/17/22  0212 04/17/22  0420 04/17/22  0632   POCTGLUCOSE 68* 133* 131* 123* 139* 127* 263* 253* 245* 255*       Current Medications and/or Treatments Impacting Glycemic Control  Immunotherapy:    Immunosuppressants       None          Steroids:   Hormones (From admission, onward)                Start     Stop Route Frequency Ordered    04/14/22 2041  melatonin tablet 6 mg         -- Oral Nightly PRN 04/14/22 1942          Pressors:    Autonomic Drugs (From admission, onward)                None          Hyperglycemia/Diabetes Medications:   Antihyperglycemics (From admission, onward)                Start     Stop Route Frequency Ordered    04/14/22 2234  insulin aspart U-100 pen 0-5 Units         -- SubQ Before meals & nightly PRN 04/14/22 2134      "       ASSESSMENT and PLAN    * Endophthalmitis  - Management per Ophthalmology    Hypoglycemia  Key History and Diagnostic Findings  - Secondary to glimepiride in the setting of ESRD worsened by poor oral intake  - May take several days for glimepiride to clear from her system  - C-peptide elevated at 14 indicating endogenous insulin production responsible for hypoglycemia; expected with glimepiride and ESRD  - 24 hour glucose trend:  Sustained while on dextrose drip, increased into the low 200s since midnight; dextrose drip discontinued around 7:00 a.m.    Plan  - Agree with holding all diabetes medications at this time and discontinuing D5 drip  - Await sulfonyurea screen to confirm presence of glimepiride (remains in process this morning)  - If serum glucose remains above 200, utilize low correction scale aspart insulin    DM2 (diabetes mellitus, type 2)  Key History and Diagnostic Findings  - A1c 5.0 on 04/14/2022 from 5.5 on 04/06/2022 likely indicating frequent hypoglycemia in this 82-year-old female  - Home Regimen: Glimepiride 4 mg daily  - Glucose Goals: 160-200mg/dL  - 24 hour glucose trend:  Frequent hypoglycemia    Plan  - Please see plan as above    ESRD (end stage renal disease)  Key History and Diagnostic Findings  - Decreased renal clearance predisposing to decreased metabolism of glimepiride allowing buildup and resultant hypoglycemia    Plan  - As above, further management per Nephrology    Secondary hypothyroidism  Key History and Diagnostic Findings  - On levothyroxine 150 mcg daily at home  - TSH 1.13 on 04/17/2022  - Weight based dose: 82 kg x 1.6 = 130    Plan  - Decrease levothyroxine from 150 down to 137 mcg daily  - Recheck TFTs in 8 weeks  - Goal TSH in elderly with multiple comorbidities: 4-7  - Avoid hyperthyroidism due to Cardiovascular disease, rate/rhythm abnormalities, and bone effects  - Ensure proper administration      Dago Reinoso DO  [facility],  [address]th [address]  [address] [place], [place], [address]    Office: [phone]  Fax: [phone]    Disclaimer: This note has been generated using voice-recognition software. There may be typographical errors that have been missed during proof-reading.    The above history labs imaging impression and plan were discussed with attending physician who is in agreement and also took part in this patient's care.  I personally reviewed all of the patients available medications, labs, imaging, vitals, allergies, medical history.

## 2022-04-17 NOTE — PROGRESS NOTES
[facility] - Intensive Care ([place] [address])  Infectious Disease  Progress Note    Patient Name: Erika Perera  MRN: [id number]  Admission Date: 4/14/2022  Length of Stay: 3 days  Attending Physician: Ila Garcia MD  Primary Care Provider: Minor Bennett MD    Isolation Status: No active isolations  Assessment/Plan:      * Endophthalmitis  83 y/o female with a hx of ESRD on HD MWF vis AV LUE, CAD, HF, HTN, DM2, and recent admission to OSH 4/5-4/8 for Klebsiella pneumoniae bacteremia thought to be 2/2 UTI. Culture results show pansensitive klebsiella, and the patient was discharged on PO ciprofloxacin. Now with eye swelling and vision changes for 1-2 weeks.  Seen by ophthalmology with concern for endophthalmitis - CT orbit with minimal asymmetric enhancement at the periphery of the left globe compared to the right could represent a mild inflammatory or infectious process. Due to lack of trauma and wounds to the eye, expect hematogenous.  Source of her bacteremia could be urinary vs osteomyelitis of toe.  Klebsiella pneumoniae is also known to cause liver abscesses as well.  U/S abdomen with ?right adrenal mass    Recommendations:    1. Continue IV CTX 2 g IV q 12.  2. Follow up on blood cultures.  3. Appreciate ophthalmology input, possible OR on 4/18 ( evisceration vs enucleation)  Please send for aerobic/anerobic/fungus/afb cultures.    Subacute osteomyelitis of right foot  Patient with x-ray evidence of right foot osteomyelitis s/p bedside I&D by podiatry- bone cultures sent- no path.     Bone cx 4/16 NGT    Recommendations:    1. Continue ceftriaxone IV.    2. Appreciate podiatry expertise.    3. Will follow up on cultures from the toe.        Thank you for your consult. I will follow-up with patient. Please contact us if you have any additional questions.    Fareed Valladares MD  Infectious Disease  [facility] - Intensive Care ([place] [address])    Subjective:     Principal  Problem:Endophthalmitis    HPI: Erika Perera is an 81 y/o female with a hx of ESRD on HD MWF vis AV LUE, CAD, HF, HTN, DM2, and HLD who presents to the ED from ophthalmology clinic for L eye pain and swelling x 1 week.     Patient was recently admitted to OSH 4/5-4/8 for Klebsiella pneumoniae bacteremia thought to be 2/2 UTI. Culture results show pansensitive klebsiella, and the patient was discharged on PO ciprofloxacin and reports compliance with the regimen. She was Seen in optometry clinic and was referred to ophthalmology clinic 04/14 over concerns for endophthalmitis. She was then sent to the ED for CT orbits and admit for likely need for enucleation and evisceration due to the severity of the disease.    She has been having worsening vision and eye swelling for 1 week, denies any fever chills, N/V abdominal pain or diarrhea. She denies any eye truama.      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 Negative for appetite change, chills and fever.   HENT:  Negative for congestion.    Eyes:  Positive for pain and visual disturbance (left eye). Negative for itching.   Respiratory:  Negative for cough, chest tightness and shortness of breath.    Cardiovascular:  Negative for palpitations and leg swelling.   Gastrointestinal:  Positive for nausea. Negative for abdominal pain and diarrhea.   Endocrine: Negative for polyphagia and polyuria.   Genitourinary:  Negative for dysuria and frequency.   Musculoskeletal:  Negative for back pain.   Neurological:  Negative for numbness and headaches.   Psychiatric/Behavioral:  Negative for agitation and behavioral problems.    Objective:     Vital Signs (Most Recent):  Temp: 98.3 °F (36.8 °C) (04/17/22 0745)  Pulse: (!) 54 (04/17/22 1128)  Resp: 16 (04/17/22 0838)  BP: (!) 177/74 (04/17/22 0744)  SpO2: 96 % (04/17/22 1128)   Vital Signs (24h Range):  Temp:  [96.5 °F (35.8  °C)-99.3 °F (37.4 °C)] 98.3 °F (36.8 °C)  Pulse:  [54-73] 54  Resp:  [10-27] 16  SpO2:  [95 %-100 %] 96 %  BP: (133-189)/(61-92) 177/74     Weight: 81.6 kg (180 lb)  Body mass index is 29.95 kg/m².    Estimated Creatinine Clearance: 11.7 mL/min (A) (based on SCr of 3.9 mg/dL (H)).    Physical Exam  Constitutional:       Appearance: She is well-developed.   HENT:      Head: Normocephalic.   Eyes:      General:         Left eye: Discharge present.     Conjunctiva/sclera:      Left eye: Chemosis present.   Cardiovascular:      Rate and Rhythm: Normal rate and regular rhythm.      Heart sounds: Normal heart sounds. No murmur heard.  Pulmonary:      Effort: Pulmonary effort is normal.      Breath sounds: Normal breath sounds.   Abdominal:      General: Bowel sounds are normal. There is no distension.      Palpations: Abdomen is soft.      Tenderness: There is no abdominal tenderness.   Musculoskeletal:         General: Normal range of motion.   Neurological:      Mental Status: She is alert and oriented to person, place, and time.   Psychiatric:         Behavior: Behavior normal.       Significant Labs:   Microbiology Results (last 7 days)       Procedure Component Value Units Date/Time    Culture, Anaerobe [[id number]] Collected: 04/16/22 1112    Order Status: Completed Specimen: Bone from Toe, Right Foot Updated: 04/17/22 0756     Anaerobic Culture Culture in progress    Aerobic culture [[id number]] Collected: 04/16/22 1112    Order Status: Completed Specimen: Bone from Toe, Right Foot Updated: 04/17/22 0726     Aerobic Bacterial Culture No growth    Blood culture #2 **CANNOT BE ORDERED STAT** [[id number]] Collected: 04/14/22 1729    Order Status: Completed Specimen: Blood from Peripheral, Forearm, Right Updated: 04/16/22 2012     Blood Culture, Routine No Growth to date      No Growth to date      No Growth to date    Blood culture #1 **CANNOT BE ORDERED STAT** [[id number]] Collected: 04/14/22 1728    Order Status:  Completed Specimen: Blood from Peripheral, Forearm, Right Updated: 04/16/22 2012     Blood Culture, Routine No Growth to date      No Growth to date      No Growth to date            Significant Imaging: I have reviewed all pertinent imaging results/findings within the past 24 hours.

## 2022-04-18 ENCOUNTER — TELEPHONE (OUTPATIENT)
Dept: ENDOSCOPY | Facility: HOSPITAL | Age: 82
End: 2022-04-18
Payer: MEDICARE

## 2022-04-18 ENCOUNTER — TELEPHONE (OUTPATIENT)
Dept: OPHTHALMOLOGY | Facility: CLINIC | Age: 82
End: 2022-04-18
Payer: MEDICARE

## 2022-04-18 DIAGNOSIS — H44.002 ENDOPHTHALMITIS, LEFT EYE: Primary | ICD-10-CM

## 2022-04-18 PROBLEM — E27.8 ADRENAL MASS, RIGHT: Status: ACTIVE | Noted: 2022-04-18

## 2022-04-18 LAB
ALBUMIN SERPL BCP-MCNC: 2.1 G/DL (ref 3.5–5.2)
ALP SERPL-CCNC: 100 U/L (ref 55–135)
ALT SERPL W/O P-5'-P-CCNC: 6 U/L (ref 10–44)
ANION GAP SERPL CALC-SCNC: 13 MMOL/L (ref 8–16)
ASCENDING AORTA: 2.43 CM
AST SERPL-CCNC: 27 U/L (ref 10–40)
AV INDEX (PROSTH): 0.69
AV MEAN GRADIENT: 4 MMHG
AV PEAK GRADIENT: 7 MMHG
AV VALVE AREA: 2.28 CM2
AV VELOCITY RATIO: 0.67
BASOPHILS # BLD AUTO: 0.02 K/UL (ref 0–0.2)
BASOPHILS NFR BLD: 0.2 % (ref 0–1.9)
BILIRUB SERPL-MCNC: 1 MG/DL (ref 0.1–1)
BSA FOR ECHO PROCEDURE: 1.93 M2
BUN SERPL-MCNC: 41 MG/DL (ref 8–23)
CALCIUM SERPL-MCNC: 8.8 MG/DL (ref 8.7–10.5)
CHLORIDE SERPL-SCNC: 96 MMOL/L (ref 95–110)
CO2 SERPL-SCNC: 24 MMOL/L (ref 23–29)
CREAT SERPL-MCNC: 5.5 MG/DL (ref 0.5–1.4)
CV ECHO LV RWT: 0.43 CM
DIFFERENTIAL METHOD: ABNORMAL
DOP CALC AO PEAK VEL: 1.36 M/S
DOP CALC AO VTI: 32.73 CM
DOP CALC LVOT AREA: 3.3 CM2
DOP CALC LVOT DIAMETER: 2.05 CM
DOP CALC LVOT PEAK VEL: 0.91 M/S
DOP CALC LVOT STROKE VOLUME: 74.56 CM3
DOP CALC MV VTI: 42.82 CM
DOP CALCLVOT PEAK VEL VTI: 22.6 CM
E WAVE DECELERATION TIME: 225.47 MSEC
E/A RATIO: 1.87
E/E' RATIO: 21.17 M/S
ECHO LV POSTERIOR WALL: 0.9 CM (ref 0.6–1.1)
EJECTION FRACTION: 60 %
EOSINOPHIL # BLD AUTO: 0.1 K/UL (ref 0–0.5)
EOSINOPHIL NFR BLD: 0.9 % (ref 0–8)
ERYTHROCYTE [DISTWIDTH] IN BLOOD BY AUTOMATED COUNT: 14.8 % (ref 11.5–14.5)
EST. GFR  (AFRICAN AMERICAN): 7.7 ML/MIN/1.73 M^2
EST. GFR  (NON AFRICAN AMERICAN): 6.7 ML/MIN/1.73 M^2
FRACTIONAL SHORTENING: 28 % (ref 28–44)
GLUCOSE SERPL-MCNC: 99 MG/DL (ref 70–110)
HCT VFR BLD AUTO: 32.2 % (ref 37–48.5)
HGB BLD-MCNC: 9.8 G/DL (ref 12–16)
IMM GRANULOCYTES # BLD AUTO: 0.06 K/UL (ref 0–0.04)
IMM GRANULOCYTES NFR BLD AUTO: 0.7 % (ref 0–0.5)
INTERVENTRICULAR SEPTUM: 0.9 CM (ref 0.6–1.1)
LA MAJOR: 6.11 CM
LA MINOR: 6.31 CM
LA WIDTH: 4.62 CM
LEFT ATRIUM SIZE: 4.1 CM
LEFT ATRIUM VOLUME INDEX MOD: 35.5 ML/M2
LEFT ATRIUM VOLUME INDEX: 52.9 ML/M2
LEFT ATRIUM VOLUME MOD: 67.06 CM3
LEFT ATRIUM VOLUME: 99.96 CM3
LEFT INTERNAL DIMENSION IN SYSTOLE: 2.99 CM (ref 2.1–4)
LEFT VENTRICLE DIASTOLIC VOLUME INDEX: 40.52 ML/M2
LEFT VENTRICLE DIASTOLIC VOLUME: 76.59 ML
LEFT VENTRICLE MASS INDEX: 62 G/M2
LEFT VENTRICLE SYSTOLIC VOLUME INDEX: 18.4 ML/M2
LEFT VENTRICLE SYSTOLIC VOLUME: 34.84 ML
LEFT VENTRICULAR INTERNAL DIMENSION IN DIASTOLE: 4.15 CM (ref 3.5–6)
LEFT VENTRICULAR MASS: 116.39 G
LV LATERAL E/E' RATIO: 21.17 M/S
LV SEPTAL E/E' RATIO: 21.17 M/S
LYMPHOCYTES # BLD AUTO: 1 K/UL (ref 1–4.8)
LYMPHOCYTES NFR BLD: 10.4 % (ref 18–48)
MCH RBC QN AUTO: 29.5 PG (ref 27–31)
MCHC RBC AUTO-ENTMCNC: 30.4 G/DL (ref 32–36)
MCV RBC AUTO: 97 FL (ref 82–98)
MONOCYTES # BLD AUTO: 0.6 K/UL (ref 0.3–1)
MONOCYTES NFR BLD: 6.2 % (ref 4–15)
MV MEAN GRADIENT: 1 MMHG
MV PEAK A VEL: 0.68 M/S
MV PEAK E VEL: 1.27 M/S
MV PEAK GRADIENT: 9 MMHG
MV STENOSIS PRESSURE HALF TIME: 65.39 MS
MV VALVE AREA BY CONTINUITY EQUATION: 1.74 CM2
MV VALVE AREA P 1/2 METHOD: 3.36 CM2
NEUTROPHILS # BLD AUTO: 7.5 K/UL (ref 1.8–7.7)
NEUTROPHILS NFR BLD: 81.6 % (ref 38–73)
NRBC BLD-RTO: 0 /100 WBC
PISA MRMAX VEL: 0.05 M/S
PISA TR MAX VEL: 3.55 M/S
PLATELET # BLD AUTO: 271 K/UL (ref 150–450)
PMV BLD AUTO: 8.9 FL (ref 9.2–12.9)
POCT GLUCOSE: 102 MG/DL (ref 70–110)
POCT GLUCOSE: 119 MG/DL (ref 70–110)
POCT GLUCOSE: 123 MG/DL (ref 70–110)
POCT GLUCOSE: 141 MG/DL (ref 70–110)
POCT GLUCOSE: 146 MG/DL (ref 70–110)
POCT GLUCOSE: 90 MG/DL (ref 70–110)
POTASSIUM SERPL-SCNC: 4.8 MMOL/L (ref 3.5–5.1)
PROT SERPL-MCNC: 6.5 G/DL (ref 6–8.4)
RA MAJOR: 5.76 CM
RA PRESSURE: 15 MMHG
RA WIDTH: 4.11 CM
RBC # BLD AUTO: 3.32 M/UL (ref 4–5.4)
RIGHT VENTRICULAR END-DIASTOLIC DIMENSION: 4.24 CM
SINUS: 2.9 CM
SODIUM SERPL-SCNC: 133 MMOL/L (ref 136–145)
STJ: 2.75 CM
TDI LATERAL: 0.06 M/S
TDI SEPTAL: 0.06 M/S
TDI: 0.06 M/S
TR MAX PG: 50 MMHG
TRICUSPID ANNULAR PLANE SYSTOLIC EXCURSION: 1.28 CM
TV REST PULMONARY ARTERY PRESSURE: 65 MMHG
WBC # BLD AUTO: 9.21 K/UL (ref 3.9–12.7)

## 2022-04-18 PROCEDURE — 25000003 PHARM REV CODE 250: Performed by: HOSPITALIST

## 2022-04-18 PROCEDURE — 94761 N-INVAS EAR/PLS OXIMETRY MLT: CPT

## 2022-04-18 PROCEDURE — 80053 COMPREHEN METABOLIC PANEL: CPT | Performed by: HOSPITALIST

## 2022-04-18 PROCEDURE — 85025 COMPLETE CBC W/AUTO DIFF WBC: CPT | Performed by: HOSPITALIST

## 2022-04-18 PROCEDURE — 99232 SBSQ HOSP IP/OBS MODERATE 35: CPT | Mod: ,,, | Performed by: HOSPITALIST

## 2022-04-18 PROCEDURE — 63600175 PHARM REV CODE 636 W HCPCS: Performed by: HOSPITALIST

## 2022-04-18 PROCEDURE — 63600175 PHARM REV CODE 636 W HCPCS: Performed by: INTERNAL MEDICINE

## 2022-04-18 PROCEDURE — 99232 PR SUBSEQUENT HOSPITAL CARE,LEVL II: ICD-10-PCS | Mod: GC,,, | Performed by: INTERNAL MEDICINE

## 2022-04-18 PROCEDURE — 99232 SBSQ HOSP IP/OBS MODERATE 35: CPT | Mod: GC,,, | Performed by: INTERNAL MEDICINE

## 2022-04-18 PROCEDURE — 25000003 PHARM REV CODE 250: Performed by: STUDENT IN AN ORGANIZED HEALTH CARE EDUCATION/TRAINING PROGRAM

## 2022-04-18 PROCEDURE — 20600001 HC STEP DOWN PRIVATE ROOM

## 2022-04-18 PROCEDURE — 99233 PR SUBSEQUENT HOSPITAL CARE,LEVL III: ICD-10-PCS | Mod: GC,,, | Performed by: INTERNAL MEDICINE

## 2022-04-18 PROCEDURE — 99233 SBSQ HOSP IP/OBS HIGH 50: CPT | Mod: GC,,, | Performed by: INTERNAL MEDICINE

## 2022-04-18 PROCEDURE — 36415 COLL VENOUS BLD VENIPUNCTURE: CPT | Performed by: HOSPITALIST

## 2022-04-18 PROCEDURE — 99232 PR SUBSEQUENT HOSPITAL CARE,LEVL II: ICD-10-PCS | Mod: ,,, | Performed by: HOSPITALIST

## 2022-04-18 PROCEDURE — C9113 INJ PANTOPRAZOLE SODIUM, VIA: HCPCS | Performed by: HOSPITALIST

## 2022-04-18 RX ORDER — SODIUM CHLORIDE 9 MG/ML
INJECTION, SOLUTION INTRAVENOUS ONCE
Status: DISCONTINUED | OUTPATIENT
Start: 2022-04-19 | End: 2022-04-20

## 2022-04-18 RX ADMIN — FLUTICASONE FUROATE AND VILANTEROL TRIFENATATE 1 PUFF: 100; 25 POWDER RESPIRATORY (INHALATION) at 10:04

## 2022-04-18 RX ADMIN — HYDRALAZINE HYDROCHLORIDE 25 MG: 25 TABLET, FILM COATED ORAL at 03:04

## 2022-04-18 RX ADMIN — MUPIROCIN: 20 OINTMENT TOPICAL at 08:04

## 2022-04-18 RX ADMIN — PANTOPRAZOLE SODIUM 40 MG: 40 INJECTION, POWDER, FOR SOLUTION INTRAVENOUS at 09:04

## 2022-04-18 RX ADMIN — ISOSORBIDE MONONITRATE 20 MG: 20 TABLET ORAL at 08:04

## 2022-04-18 RX ADMIN — MUPIROCIN: 20 OINTMENT TOPICAL at 09:04

## 2022-04-18 RX ADMIN — ISOSORBIDE MONONITRATE 20 MG: 20 TABLET ORAL at 09:04

## 2022-04-18 RX ADMIN — CEFTRIAXONE 2 G: 2 INJECTION, SOLUTION INTRAVENOUS at 05:04

## 2022-04-18 RX ADMIN — PANTOPRAZOLE SODIUM 40 MG: 40 INJECTION, POWDER, FOR SOLUTION INTRAVENOUS at 08:04

## 2022-04-18 RX ADMIN — POLYETHYLENE GLYCOL 3350 17 G: 17 POWDER, FOR SOLUTION ORAL at 08:04

## 2022-04-18 RX ADMIN — LEVOTHYROXINE SODIUM 137 MCG: 25 TABLET ORAL at 08:04

## 2022-04-18 RX ADMIN — HYDRALAZINE HYDROCHLORIDE 25 MG: 25 TABLET, FILM COATED ORAL at 09:04

## 2022-04-18 RX ADMIN — OXYCODONE HYDROCHLORIDE 10 MG: 10 TABLET ORAL at 10:04

## 2022-04-18 NOTE — SUBJECTIVE & OBJECTIVE
Interval History: Afebrile. Denies HA, but endorses left eye blindness.       Review of Systems   Constitutional:  Positive for activity change. Negative for appetite change, chills and fever.   HENT:  Negative for congestion.    Eyes:  Positive for pain and visual disturbance (left eye). Negative for itching.   Respiratory:  Negative for cough, chest tightness and shortness of breath.    Cardiovascular:  Negative for palpitations and leg swelling.   Gastrointestinal:  Positive for nausea. Negative for abdominal pain and diarrhea.   Endocrine: Negative for polyphagia and polyuria.   Genitourinary:  Negative for dysuria and frequency.   Musculoskeletal:  Negative for back pain.   Neurological:  Negative for numbness and headaches.   Psychiatric/Behavioral:  Negative for agitation and behavioral problems.    Objective:     Vital Signs (Most Recent):  Temp: 98.2 °F (36.8 °C) (04/18/22 0828)  Pulse: (!) 54 (04/18/22 1010)  Resp: 16 (04/18/22 1010)  BP: (!) 130/51 (04/18/22 0828)  SpO2: 96 % (04/18/22 0828)   Vital Signs (24h Range):  Temp:  [97.9 °F (36.6 °C)-98.4 °F (36.9 °C)] 98.2 °F (36.8 °C)  Pulse:  [52-56] 54  Resp:  [14-27] 16  SpO2:  [96 %-98 %] 96 %  BP: (122-160)/(50-70) 130/51     Weight: 81.6 kg (180 lb)  Body mass index is 29.95 kg/m².    Estimated Creatinine Clearance: 8.3 mL/min (A) (based on SCr of 5.5 mg/dL (H)).    Physical Exam  Constitutional:       Appearance: She is well-developed.   HENT:      Head: Normocephalic.   Eyes:      General:         Left eye: Discharge present.     Conjunctiva/sclera:      Left eye: Chemosis present.   Cardiovascular:      Rate and Rhythm: Normal rate and regular rhythm.      Heart sounds: Normal heart sounds. No murmur heard.  Pulmonary:      Effort: Pulmonary effort is normal.      Breath sounds: Normal breath sounds.   Abdominal:      General: Bowel sounds are normal. There is no distension.      Palpations: Abdomen is soft.      Tenderness: There is no abdominal  tenderness.   Musculoskeletal:         General: Normal range of motion.   Neurological:      Mental Status: She is alert and oriented to person, place, and time.   Psychiatric:         Behavior: Behavior normal.       Significant Labs:   Microbiology Results (last 7 days)       Procedure Component Value Units Date/Time    Culture, Anaerobe [[id number]] Collected: 04/16/22 1112    Order Status: Completed Specimen: Bone from Toe, Right Foot Updated: 04/18/22 1015     Anaerobic Culture Culture in progress    Aerobic culture [[id number]] Collected: 04/16/22 1112    Order Status: Completed Specimen: Bone from Toe, Right Foot Updated: 04/18/22 0836     Aerobic Bacterial Culture No growth    Blood culture #1 **CANNOT BE ORDERED STAT** [[id number]] Collected: 04/14/22 1728    Order Status: Completed Specimen: Blood from Peripheral, Forearm, Right Updated: 04/17/22 2012     Blood Culture, Routine No Growth to date      No Growth to date      No Growth to date      No Growth to date    Blood culture #2 **CANNOT BE ORDERED STAT** [[id number]] Collected: 04/14/22 1729    Order Status: Completed Specimen: Blood from Peripheral, Forearm, Right Updated: 04/17/22 2012     Blood Culture, Routine No Growth to date      No Growth to date      No Growth to date      No Growth to date            Significant Imaging: I have reviewed all pertinent imaging results/findings within the past 24 hours.

## 2022-04-18 NOTE — PROGRESS NOTES
[facility] - [facility] ([place] [address])  Endocrinology  Progress Note    Admit Date: 4/14/2022     82 year old  female with T2DM, ESRD (on HD since November 2021), hypothyroidism, CAD, chronic diastolic heart failure, HTN, and HLD who presented to the ED from her ophthalmologist's clinic for evaluation of left eye endophthalmitis.  Patient reports progressively worsening left eye pain and vision loss with swelling of the upper and lower eyelids over the week prior to admission.  She was seen first in optometry clinic and was referred to ophthalmology the day of admission for concerns for endophthalmitis. She was then sent to the ED for CT orbits and admission for likely need for enucleation and evisceration due to the severity of the disease. Of note, patient was recently admitted to [facility] 04/05/2022 through 04/08/2022 for Klebsiella pneumoniae bacteremia thought to be secondary to UTI. Patient was discharged on oral ciprofloxacin and reports compliance with the regimen. She denies any fevers or chills.    - Endocrinology consulted for evaluation of hypoglycemia    Regarding Diabetes Mellitus:    - Initially diagnosed with Type 2 diabetes mellitus:  Over 15 years ago per son  - Home diabetic medications include: Glimepiride 4 mg daily though does not take it daily, last dose was reportedly 1 week prior to admission per son  - A1c 5.0 on 04/14/2022 from 5.5 on 04/06/2022 likely indicating frequent hypoglycemia in this 82-year-old female.  She reports hypoglycemia was never an issue until hemodialysis sessions started late last year  - Known diabetic complications: nephropathy, retinopathy, peripheral neuropathy, and cardiovascular disease  - Cardiovascular risk factors: advanced age (older than 55 for men, 65 for women), diabetes mellitus, dyslipidemia, family history of premature cardiovascular disease, and hypertension      Interval HPI:   Overnight events: blood glucose stable      BP (!) 130/51  "(BP Location: Right arm, Patient Position: Lying)   Pulse (!) 54   Temp 98.2 °F (36.8 °C) (Oral)   Resp 16   Ht 5' 5" (1.651 m)   Wt 81.6 kg (180 lb)   SpO2 96%   BMI 29.95 kg/m²     Labs Reviewed and Include    Recent Labs   Lab 04/18/22  0448   GLU 99   CALCIUM 8.8   ALBUMIN 2.1*   PROT 6.5   *   K 4.8   CO2 24   CL 96   BUN 41*   CREATININE 5.5*   ALKPHOS 100   ALT 6*   AST 27   BILITOT 1.0     Lab Results   Component Value Date    WBC 9.21 04/18/2022    HGB 9.8 (L) 04/18/2022    HCT 32.2 (L) 04/18/2022    MCV 97 04/18/2022     04/18/2022     Recent Labs   Lab 04/17/22  0400   TSH 1.126     Lab Results   Component Value Date    HGBA1C 5.0 04/14/2022       Nutritional status:   Body mass index is 29.95 kg/m².  Lab Results   Component Value Date    ALBUMIN 2.1 (L) 04/18/2022    ALBUMIN 2.0 (L) 04/17/2022     No results found for: PREALBUMIN    Estimated Creatinine Clearance: 8.3 mL/min (A) (based on SCr of 5.5 mg/dL (H)).    Accu-Checks  Recent Labs     04/17/22  0045 04/17/22  0212 04/17/22  0420 04/17/22  0632 04/17/22  1013 04/17/22  1314 04/17/22  2027 04/18/22  0011 04/18/22  0506 04/18/22  0817   POCTGLUCOSE 263* 253* 245* 255* 185* 160* 142* 123* 119* 90       Current Medications and/or Treatments Impacting Glycemic Control  Immunotherapy:    Immunosuppressants       None          Steroids:   Hormones (From admission, onward)                Start     Stop Route Frequency Ordered    04/14/22 2041  melatonin tablet 6 mg         -- Oral Nightly PRN 04/14/22 1942          Pressors:    Autonomic Drugs (From admission, onward)                None          Hyperglycemia/Diabetes Medications:   Antihyperglycemics (From admission, onward)                Start     Stop Route Frequency Ordered    04/14/22 2234  insulin aspart U-100 pen 0-5 Units         -- SubQ Before meals & nightly PRN 04/14/22 2483            ASSESSMENT and PLAN    * Endophthalmitis  - Management per " Ophthalmology    Hypoglycemia  Key History and Diagnostic Findings  - Secondary to glimepiride in the setting of ESRD worsened by poor oral intake  - May take several days for glimepiride to clear from her system  - C-peptide elevated at 14 indicating endogenous insulin production responsible for hypoglycemia; expected with glimepiride and ESRD  - 24 hour glucose trend:  Sustained while on dextrose drip, increased into the low 200s since midnight; dextrose drip discontinued around 7:00 a.m.    Plan  - Hold all diabetes medications at this time   - Awaiting sulfonyurea screen to confirm presence of glimepiride         Secondary hypothyroidism  Key History and Diagnostic Findings  - On levothyroxine 150 mcg daily at home  - TSH 1.13 on 04/17/2022  - Weight based dose: 82 kg x 1.6 = 130    Plan  - Decreased levothyroxine from 150 down to 137 mcg daily on this admission  - Recheck TFTs in 8 weeks  - Goal TSH in elderly with multiple comorbidities: 4-7  - Avoid hyperthyroidism due to Cardiovascular disease, rate/rhythm abnormalities, and bone effects  - Ensure proper administration    DM2 (diabetes mellitus, type 2)  - A1c 5.0 on 04/14/2022 from 5.5 on 04/06/2022 likely indicating frequent hypoglycemia in this 82-year-old female  - Home Regimen: Glimepiride 4 mg daily taken infrequently  - Glucose Goals: 160-200mg/dL  - 24 hour glucose trend:  Stable blood glucose, did not require dextrose or insulin  -  If serum glucose remains above 200, utilize low correction scale aspart insulin while inpatient  - On discharge if patient requires therapy, can discharge on Tradjenta 5 mg daily    ESRD (end stage renal disease)  - Decreased renal clearance predisposing to decreased metabolism of glimepiride allowing buildup and resultant hypoglycemia  - Avoid insulin stacking, if insulin is used  - Further management per Nephrology        Romulo Powers MD  Endocrinology  [facility]

## 2022-04-18 NOTE — ASSESSMENT & PLAN NOTE
- IV PPI bid-->change to PO PPI  - serial Hb/Hct-->stable  - transfuse total 3U PRBC  - stop ASA and prophylactic heparin   - GI consulted-- recommended stool softner, high fiber, outpatient colon  - NOTE patient was recently started on ticagrelor at another hospital after a type II MI secondary to sepsis. This is being held as well.

## 2022-04-18 NOTE — PLAN OF CARE
Problem: Adult Inpatient Plan of Care  Goal: Plan of Care Review  4/18/2022 1527 by Ethel Begum RN  Outcome: Ongoing, Progressing  4/18/2022 1527 by Ethel Begum RN  Outcome: Ongoing, Progressing  Goal: Patient-Specific Goal (Individualized)  4/18/2022 1527 by Ethel Begum RN  Outcome: Ongoing, Progressing  4/18/2022 1527 by Ethel Begum RN  Outcome: Ongoing, Progressing  Goal: Absence of Hospital-Acquired Illness or Injury  4/18/2022 1527 by Ethel Begum RN  Outcome: Ongoing, Progressing  4/18/2022 1527 by Ethel Begum RN  Outcome: Ongoing, Progressing  Goal: Optimal Comfort and Wellbeing  4/18/2022 1527 by Ethel Begum RN  Outcome: Ongoing, Progressing  4/18/2022 1527 by Ethel Begum RN  Outcome: Ongoing, Progressing  Goal: Readiness for Transition of Care  4/18/2022 1527 by Ethel Begum RN  Outcome: Ongoing, Progressing  4/18/2022 1527 by Ethel Begum RN  Outcome: Ongoing, Progressing     Problem: Diabetes Comorbidity  Goal: Blood Glucose Level Within Targeted Range  4/18/2022 1527 by Ethel Begum RN  Outcome: Ongoing, Progressing  4/18/2022 1527 by Ethel Begum RN  Outcome: Ongoing, Progressing     Problem: Skin Injury Risk Increased  Goal: Skin Health and Integrity  4/18/2022 1527 by Ethel Begum RN  Outcome: Ongoing, Progressing  4/18/2022 1527 by Ethel Begum RN  Outcome: Ongoing, Progressing     Problem: Device-Related Complication Risk (Hemodialysis)  Goal: Safe, Effective Therapy Delivery  4/18/2022 1527 by Ethel Begum RN  Outcome: Ongoing, Progressing  4/18/2022 1527 by Ethel Begum RN  Outcome: Ongoing, Progressing     Problem: Hemodynamic Instability (Hemodialysis)  Goal: Effective Tissue Perfusion  4/18/2022 1527 by Ethel Begum RN  Outcome: Ongoing, Progressing  4/18/2022 1527 by Ethel Begum RN  Outcome: Ongoing, Progressing     Problem: Infection (Hemodialysis)  Goal: Absence of Infection Signs and  Symptoms  4/18/2022 1527 by Ethel Begum RN  Outcome: Ongoing, Progressing  4/18/2022 1527 by Ethel Begum RN  Outcome: Ongoing, Progressing     Problem: Impaired Wound Healing  Goal: Optimal Wound Healing  4/18/2022 1527 by Ethel Begum RN  Outcome: Ongoing, Progressing  4/18/2022 1527 by Ethel Begum RN  Outcome: Ongoing, Progressing     Problem: Infection  Goal: Absence of Infection Signs and Symptoms  4/18/2022 1527 by Ethel Begum RN  Outcome: Ongoing, Progressing  4/18/2022 1527 by Ethel Begum RN  Outcome: Ongoing, Progressing

## 2022-04-18 NOTE — PROGRESS NOTES
[facility] ([address] Medicine  Progress Note    Patient Name: Erika Perera  MRN: [id number]  Patient Class: IP- Inpatient   Admission Date: 4/14/2022  Length of Stay: 4 days  Attending Physician: Ila Garcia MD  Primary Care Provider: Minor Bennett MD        Subjective:     Principal Problem:Endophthalmitis        HPI:  83 yo F with PMHx ESRD, CAD, chronic diastolic heart failure, HTN, DM2, and HLD who presents to the ED from ophthalmology clinic for L eye pain and swelling x 1 week. Pt. Reports that she has had progressively worsening L eye pain and vision loss over the course of the last week. Pt. Reports developing floaters and slowly worsening vision loss with swelling of her upper and lower eyelids. Pt. States that she has developed worsening pain in addition to the swelling over the last couple of days which prompted her to schedule an appointment with optometry. Pt. Seen in optometry clinic and was referred to ophthalmology clinic today over concerns for endophthalmitis. She was then sent to the ED for CT orbits and admit for likely need for enucleation and evisceration due to the severity of the disease. Of note, patient was recently admitted to [facility] 4/5-/48 for Klebsiella pneumoniae bacteremia thought to be 2/2 UTI. Culture results show pansensitive klebsiella, and the patient was discharged on PO ciprofloxacin and reports compliance with the regimen. Pt. Denies any fevers, chills, eye discharge, or redness.      Overview/Hospital Course:  Patient admitted to hospital service.  Ophthalmology, ID, nephrology consulted.  Started on ceftriaxone. Blood cx collected.  CT scan demonstrated bilateral exophthalmos and inflammatory changes involving left globe.  During admission, patient noted hypoglycemic.  She has had issues at home with the same.  She takes sulfonylurea at home and is a HD patient.  Started on D10gtt, SQ octreotide, glucagon, regular renal diet,  q2hr accuchecks.  Also noted to be anemic on admission thought to be secondary to ESRD and acute infection.  Nurse noted maroon stools.  Patient received total of 3U PRBC.  GI consulted and recommended outpatient colonoscopy as well as stool softner and increase fiber diet.  Placed on IV PPI with serial Hb.  Stopped asa and ticagrelor.  Patient noted to have new right first toe ulcer.  Xray indicated osteomyelitis.  Podiatry consulted and performed bedside debridement.  Specimen sent to micro and path.  Incidental adrenal mass found on abdominal US.  Follow up with CT scan recommended at some point in the future.        Interval History:   The patient's left eye is hurting more today and is especially sensitive to light at this time.  She is keeping her left eye covered.  Ophthalmology plans for surgical intervention on Wed.  She remains afebrile.  Tolerating  PO but poor intake.  Blood glucose levels have normalized finally.    Review of Systems   Constitutional:  Positive for activity change, appetite change and fatigue. Negative for fever.   Eyes:  Positive for photophobia, pain, discharge and visual disturbance.   Respiratory:  Negative for chest tightness and shortness of breath.    Cardiovascular:  Negative for chest pain.   Gastrointestinal:  Negative for abdominal pain, constipation, diarrhea and nausea.   Genitourinary:         Patient is anuric on HD   Musculoskeletal:  Negative for back pain.   Neurological:  Negative for headaches.   Objective:     Vital Signs (Most Recent):  Temp: 98.4 °F (36.9 °C) (04/18/22 1133)  Pulse: (!) 55 (04/18/22 1206)  Resp: 14 (04/18/22 1206)  BP: (!) 143/65 (04/18/22 1133)  SpO2: (!) 94 % (04/18/22 1206)   Vital Signs (24h Range):  Temp:  [98.2 °F (36.8 °C)-98.4 °F (36.9 °C)] 98.4 °F (36.9 °C)  Pulse:  [52-56] 55  Resp:  [14-22] 14  SpO2:  [94 %-98 %] 94 %  BP: (122-160)/(50-70) 143/65     Weight: 81.6 kg (180 lb)  Body mass index is 29.95 kg/m².  No intake or output data in  the 24 hours ending 04/18/22 1419   Physical Exam  Constitutional:       Appearance: Normal appearance.   HENT:      Head: Normocephalic and atraumatic.      Nose: Nose normal.      Mouth/Throat:      Mouth: Mucous membranes are moist.      Pharynx: Oropharynx is clear.   Eyes:      Extraocular Movements:      Right eye: Normal extraocular motion.      Conjunctiva/sclera:      Right eye: No exudate.     Comments: Patient covering left eye secondary to pain and photophobia.  She was just examined by ophthalmology.    Left not visualized during this encounter.   Exophthalmus right eye.    Cardiovascular:      Rate and Rhythm: Normal rate and regular rhythm.      Heart sounds: No murmur heard.  Pulmonary:      Effort: Pulmonary effort is normal. No respiratory distress.      Breath sounds: Normal breath sounds.   Abdominal:      General: There is no distension.      Palpations: Abdomen is soft.      Tenderness: There is no abdominal tenderness.   Musculoskeletal:         General: No deformity.      Right lower leg: Edema present.      Left lower leg: Edema present.   Skin:     General: Skin is warm and dry.   Neurological:      General: No focal deficit present.      Mental Status: She is alert and oriented to person, place, and time.   Psychiatric:         Mood and Affect: Mood normal.         Behavior: Behavior normal.       Significant Labs: All pertinent labs within the past 24 hours have been reviewed.    Significant Imaging: I have reviewed all pertinent imaging results/findings within the past 24 hours.      Assessment/Plan:      * Endophthalmitis  -- Opthalmology consulted and has seen patient; plan for surgical management on Wednesday  -- CT orbits shows minimal asymmetric enhancement at the periphery of the left globe compared to the right could represent a mild inflammatory or infectious process  - Noted to have recent Klebsiella pneumoniae bacteremia treated with cipro, potential source?. Repeat blood  cultures ordered.   - Abx per ID; currently receiving ceftriaxone     Hypoglycemia  - secondary to sulfonylurea and active infection   - SQ octreotide, D10, Glucagon, regular diet    - transferring to higher level of care for q2hr accuchecks   - 4/17- finally improving   - 4/18- no longer on D10 gtt; transfer to regular med/surg floor     Gastrointestinal bleeding  - IV PPI bid-->change to PO PPI  - serial Hb/Hct-->stable  - transfuse total 3U PRBC  - stop ASA and prophylactic heparin   - GI consulted-- recommended stool softner, high fiber, outpatient colon  - NOTE patient was recently started on ticagrelor at another hospital after a type II MI secondary to sepsis. This is being held as well.     Subacute osteomyelitis of right foot  - patient went to [facility] for pedicure two weeks ago and developed ulcer right first toe  - XRAY demonstrates evidence of osteomyelitis   - Continue abx coverage  - consult podiatry -- bedside debridement  - follow up cx and path      Adrenal mass, right  - Incidentally found on abdominal US.  - will need further imaging at some point in the future.     ESRD (end stage renal disease)  - No acue issues or need for urgent HD, nephrology consulted for regular HD while admitted      DM2 (diabetes mellitus, type 2)  -A1c ordered and pending  - Hold diabetes medication secondary to hypoglycemia  - discontinue glimepiride indefinitely       Secondary hypothyroidism  - restart home dose levothyroxine  - TSH WNL    Bacteremia due to Klebsiella pneumoniae  -Recently diagnosed based on blood cultures done at [facility] 4/5, pansensitive  -Suspect bacteremia may be cause of Endogenous bacterial endophthalmitis, repeat blood cultures ordered  - Consult ID        HTN (hypertension)  -Continue home coreg, hydralazine, and isosorbide    CAD (coronary artery disease)  -No complaints of chest pain, baseline EKG ordered. TTE ordered to rule out vegetetation in setting of bacteremia with concern for  endogenous bacterial endophthalmitis--> so far blood cx negative   -Continue home statin  - holding asa and ticagrelor secondary to GI bleed         VTE Risk Mitigation (From admission, onward)         Ordered     heparin (porcine) injection 1,000 Units  As needed (PRN)         04/15/22 0757     IP VTE HIGH RISK PATIENT  Once         04/14/22 1942     Place sequential compression device  Until discontinued         04/14/22 1942                Discharge Planning   ROM: 4/19/2022     Code Status: Full Code   Is the patient medically ready for discharge?: No    Reason for patient still in hospital (select all that apply): Treatment  Discharge Plan A: Home Health, Home with family                  Ila Garcia MD  Department of Hospital Medicine   [facility] - Intensive Care ([place]-)

## 2022-04-18 NOTE — ASSESSMENT & PLAN NOTE
-- Opthalmology consulted and has seen patient; plan for surgical management on Wednesday  -- CT orbits shows minimal asymmetric enhancement at the periphery of the left globe compared to the right could represent a mild inflammatory or infectious process  - Noted to have recent Klebsiella pneumoniae bacteremia treated with cipro, potential source?. Repeat blood cultures ordered.   - Abx per ID; currently receiving ceftriaxone

## 2022-04-18 NOTE — ASSESSMENT & PLAN NOTE
Key History and Diagnostic Findings  - On levothyroxine 150 mcg daily at home  - TSH 1.13 on 04/17/2022  - Weight based dose: 82 kg x 1.6 = 130    Plan  - Decreased levothyroxine from 150 down to 137 mcg daily on this admission  - Recheck TFTs in 8 weeks  - Goal TSH in elderly with multiple comorbidities: 4-7  - Avoid hyperthyroidism due to Cardiovascular disease, rate/rhythm abnormalities, and bone effects  - Ensure proper administration

## 2022-04-18 NOTE — SUBJECTIVE & OBJECTIVE
Interval History:   The patient's left eye is hurting more today and is especially sensitive to light at this time.  She is keeping her left eye covered.  Ophthalmology plans for surgical intervention on Wed.  She remains afebrile.  Tolerating  PO but poor intake.  Blood glucose levels have normalized finally.    Review of Systems   Constitutional:  Positive for activity change, appetite change and fatigue. Negative for fever.   Eyes:  Positive for photophobia, pain, discharge and visual disturbance.   Respiratory:  Negative for chest tightness and shortness of breath.    Cardiovascular:  Negative for chest pain.   Gastrointestinal:  Negative for abdominal pain, constipation, diarrhea and nausea.   Genitourinary:         Patient is anuric on HD   Musculoskeletal:  Negative for back pain.   Neurological:  Negative for headaches.   Objective:     Vital Signs (Most Recent):  Temp: 98.4 °F (36.9 °C) (04/18/22 1133)  Pulse: (!) 55 (04/18/22 1206)  Resp: 14 (04/18/22 1206)  BP: (!) 143/65 (04/18/22 1133)  SpO2: (!) 94 % (04/18/22 1206)   Vital Signs (24h Range):  Temp:  [98.2 °F (36.8 °C)-98.4 °F (36.9 °C)] 98.4 °F (36.9 °C)  Pulse:  [52-56] 55  Resp:  [14-22] 14  SpO2:  [94 %-98 %] 94 %  BP: (122-160)/(50-70) 143/65     Weight: 81.6 kg (180 lb)  Body mass index is 29.95 kg/m².  No intake or output data in the 24 hours ending 04/18/22 1419   Physical Exam  Constitutional:       Appearance: Normal appearance.   HENT:      Head: Normocephalic and atraumatic.      Nose: Nose normal.      Mouth/Throat:      Mouth: Mucous membranes are moist.      Pharynx: Oropharynx is clear.   Eyes:      Extraocular Movements:      Right eye: Normal extraocular motion.      Conjunctiva/sclera:      Right eye: No exudate.     Comments: Patient covering left eye secondary to pain and photophobia.  She was just examined by ophthalmology.    Left not visualized during this encounter.   Exophthalmus right eye.    Cardiovascular:      Rate and  Rhythm: Normal rate and regular rhythm.      Heart sounds: No murmur heard.  Pulmonary:      Effort: Pulmonary effort is normal. No respiratory distress.      Breath sounds: Normal breath sounds.   Abdominal:      General: There is no distension.      Palpations: Abdomen is soft.      Tenderness: There is no abdominal tenderness.   Musculoskeletal:         General: No deformity.      Right lower leg: Edema present.      Left lower leg: Edema present.   Skin:     General: Skin is warm and dry.   Neurological:      General: No focal deficit present.      Mental Status: She is alert and oriented to person, place, and time.   Psychiatric:         Mood and Affect: Mood normal.         Behavior: Behavior normal.       Significant Labs: All pertinent labs within the past 24 hours have been reviewed.    Significant Imaging: I have reviewed all pertinent imaging results/findings within the past 24 hours.

## 2022-04-18 NOTE — PLAN OF CARE
[facility] ([place] [address])  Initial Discharge Assessment       Primary Care Provider: Minor Bennett MD    Admission Diagnosis: ESRD (end stage renal disease) [N18.6]  Hypopyon of left eye [H20.052]  Orbital cellulitis, left [H05.012]  Left eye pain [H57.12]  Type 2 diabetes mellitus with chronic kidney disease on chronic dialysis, without long-term current use of insulin [E11.22, N18.6, Z99.2]    Admission Date: 4/14/2022  Expected Discharge Date: 4/19/2022    Discharge Barriers Identified: None    Payor: [employer] MANAGED MEDICARE / Plan: HU[employer] TOTAL CARE ADVANTAGE / Product Type: Medicare Advantage /     Extended Emergency Contact Information  Primary Emergency Contact: Della Perera  Mobile Phone: [phone]  Relation: Daughter  Secondary Emergency Contact: German Perera  Mobile Phone: [phone]  Relation: Son    Discharge Plan A: Home Health, Home with family  Discharge Plan B: Home Health, Home with family      [facility]/pharmacy #[id number] - [place], [place] - [address] [address]  Phone: [phone] Fax: [phone]    [facility] - [facility] [address] [facility]  [address] [facility] [address]  Phone: [phone] Fax: [phone]      Initial Assessment (most recent)     Adult Discharge Assessment - 04/18/22 1351        Discharge Assessment    Assessment Type Discharge Planning Assessment     Confirmed/corrected address, phone number and insurance Yes     Confirmed Demographics Correct on Facesheet     Source of Information family     When was your last doctors appointment? 04/14/22     Does patient/caregiver understand observation status Yes     Communicated ROM with patient/caregiver Date not available/Unable to determine     Reason For Admission Endophthalmitis     Lives With child(young), adult     Facility Arrived From: Opthamology Clinic 10th floor     Do you expect to return to your current living  situation? Yes     Do you have help at home or someone to help you manage your care at home? Yes     Who are your caregiver(s) and their phone number(s)? German Perera (son) [phone]     Prior to hospitilization cognitive status: Unable to Assess     Current cognitive status: Unable to Assess     Walking or Climbing Stairs Difficulty ambulation difficulty, requires equipment;ambulation difficulty, assistance 1 person;transferring difficulty, assistance 1 person;stair climbing difficulty, assistance 1 person     Mobility Management Rolling walker, cane, wheelchair     Dressing/Bathing Difficulty bathing difficulty, requires equipment;bathing difficulty, assistance 1 person     Dressing/Bathing Management Shower chair, b/s commode     Home Accessibility stairs to enter home     Number of Stairs, Main Entrance three     Home Layout Able to live on 1st floor     Equipment Currently Used at Home bedside commode;cane, quad;walker, rolling;wheelchair;shower chair     Readmission within 30 days? No     Patient currently being followed by outpatient case management? No     Do you currently have service(s) that help you manage your care at home? Yes     Name and Contact number of agency [employer] [phone]     Is the pt/caregiver preference to resume services with current agency Yes     Do you take prescription medications? Yes     Do you have prescription coverage? Yes     Coverage [employer]     Do you have any problems affording any of your prescribed medications? No     Is the patient taking medications as prescribed? yes     Who is going to help you get home at discharge? German Perera (son) [phone]     How do you get to doctors appointments? health plan transportation     Are you on dialysis? No     Do you take coumadin? No     Discharge Plan A Home Health;Home with family     Discharge Plan B Home Health;Home with family     DME Needed Upon Discharge  other (see comments)   TBD     Discharge Plan discussed with: Adult children   Vincent (son) [phone]    Discharge Barriers Identified None        Relationship/Environment    Name(s) of Who Lives With Patient German Perera (son) [phone]                  SW met with patient and Vincent (son) in room for Discharge Planning Assessment.  Patient was sleeping and unable to answer questions.  Per Vincent (son), patient lives with German Perlason) [phone] in a single story residence with 2-3 step(s) to enter.   Per Vincent (son), patient was mostly dependent with ADLS and used wheelchair, walker (rolling), cane, b/s commode, and shower chair for ambulation.  Patient will have assistance from German Perera (son) [phone] upon discharge.  All questions addressed.  Assigned SW/CM will follow for needs.    Adwoa Saldana, [facility]  [phone]

## 2022-04-18 NOTE — ASSESSMENT & PLAN NOTE
- A1c 5.0 on 04/14/2022 from 5.5 on 04/06/2022 likely indicating frequent hypoglycemia in this 82-year-old female  - Home Regimen: Glimepiride 4 mg daily taken infrequently  - Glucose Goals: 160-200mg/dL  - 24 hour glucose trend:  Stable blood glucose, did not require dextrose or insulin

## 2022-04-18 NOTE — SUBJECTIVE & OBJECTIVE
"Interval HPI:   Overnight events: blood glucose stable      BP (!) 130/51 (BP Location: Right arm, Patient Position: Lying)   Pulse (!) 54   Temp 98.2 °F (36.8 °C) (Oral)   Resp 16   Ht 5' 5" (1.651 m)   Wt 81.6 kg (180 lb)   SpO2 96%   BMI 29.95 kg/m²     Labs Reviewed and Include    Recent Labs   Lab 04/18/22  0448   GLU 99   CALCIUM 8.8   ALBUMIN 2.1*   PROT 6.5   *   K 4.8   CO2 24   CL 96   BUN 41*   CREATININE 5.5*   ALKPHOS 100   ALT 6*   AST 27   BILITOT 1.0     Lab Results   Component Value Date    WBC 9.21 04/18/2022    HGB 9.8 (L) 04/18/2022    HCT 32.2 (L) 04/18/2022    MCV 97 04/18/2022     04/18/2022     Recent Labs   Lab 04/17/22  0400   TSH 1.126     Lab Results   Component Value Date    HGBA1C 5.0 04/14/2022       Nutritional status:   Body mass index is 29.95 kg/m².  Lab Results   Component Value Date    ALBUMIN 2.1 (L) 04/18/2022    ALBUMIN 2.0 (L) 04/17/2022     No results found for: PREALBUMIN    Estimated Creatinine Clearance: 8.3 mL/min (A) (based on SCr of 5.5 mg/dL (H)).    Accu-Checks  Recent Labs     04/17/22  0045 04/17/22  0212 04/17/22  0420 04/17/22  0632 04/17/22  1013 04/17/22  1314 04/17/22  2027 04/18/22  0011 04/18/22  0506 04/18/22  0817   POCTGLUCOSE 263* 253* 245* 255* 185* 160* 142* 123* 119* 90       Current Medications and/or Treatments Impacting Glycemic Control  Immunotherapy:    Immunosuppressants       None          Steroids:   Hormones (From admission, onward)                Start     Stop Route Frequency Ordered    04/14/22 2041  melatonin tablet 6 mg         -- Oral Nightly PRN 04/14/22 1942          Pressors:    Autonomic Drugs (From admission, onward)                None          Hyperglycemia/Diabetes Medications:   Antihyperglycemics (From admission, onward)                Start     Stop Route Frequency Ordered    04/14/22 8447  insulin aspart U-100 pen 0-5 Units         -- SubQ Before meals & nightly PRN 04/14/22 2134          "

## 2022-04-18 NOTE — ASSESSMENT & PLAN NOTE
- secondary to sulfonylurea and active infection   - SQ octreotide, D10, Glucagon, regular diet    - transferring to higher level of care for q2hr accuchecks   - 4/17- finally improving   - 4/18- no longer on D10 gtt; transfer to regular med/surg floor

## 2022-04-18 NOTE — ASSESSMENT & PLAN NOTE
-No complaints of chest pain, baseline EKG ordered. TTE ordered to rule out vegetetation in setting of bacteremia with concern for endogenous bacterial endophthalmitis--> so far blood cx negative   -Continue home statin  - holding asa and ticagrelor secondary to GI bleed

## 2022-04-18 NOTE — PROGRESS NOTES
[facility] - Intensive Care ([place] [address])  Infectious Disease  Progress Note    Patient Name: Erika Perera  MRN: [id number]  Admission Date: 4/14/2022  Length of Stay: 4 days  Attending Physician: Ila Garcia MD  Primary Care Provider: Minor Bennett MD    Isolation Status: No active isolations  Assessment/Plan:      * Endophthalmitis  83 y/o female with a hx of ESRD on HD MWF vis AV LUE, CAD, HF, HTN, DM2, and recent admission to OSH 4/5-4/8 for Klebsiella pneumoniae bacteremia thought to be 2/2 UTI. Culture results show pansensitive klebsiella, and the patient was discharged on PO ciprofloxacin. Now with eye swelling and vision changes for 1-2 weeks.  Seen by ophthalmology with concern for endophthalmitis - CT orbit with minimal asymmetric enhancement at the periphery of the left globe compared to the right could represent a mild inflammatory or infectious process. Due to lack of trauma and wounds to the eye, expect hematogenous.  Source of her bacteremia could be urinary vs osteomyelitis of toe.  Klebsiella pneumoniae is also known to cause liver abscesses as well.  U/S abdomen with ?right adrenal mass    Recommendations:  -Continue ceftriaxone 2 g IV q 12.  -Follow up Bcx.  -Appreciate ophthalmology input - Plan for OR on 4/20 (evisceration vs enucleation).       Subacute osteomyelitis of right foot  Patient with x-ray evidence of right foot osteomyelitis s/p bedside I&D by podiatry- bone cultures sent- no path.     Bone cx 4/16 NGT      Recommendation:  -Continue ceftriaxone 2 g IV q 12.   -Appreciate podiatry expertise.  -Will follow up on cultures from toe.              Anticipated Disposition: defer to primary team     Thank you for your consult. I will follow-up with patient. Please contact us if you have any additional questions.    Poncho Benavidez MD  Infectious Disease  [facility] - Intensive [facility] ([place] [address])    Subjective:     Principal Problem:Endophthalmitis    HPI: Erika Perera  is an 81 y/o female with a hx of ESRD on HD MWF vis AV LUE, CAD, HF, HTN, DM2, and HLD who presents to the ED from ophthalmology clinic for L eye pain and swelling x 1 week.     Patient was recently admitted to OSH 4/5-4/8 for Klebsiella pneumoniae bacteremia thought to be 2/2 UTI. Culture results show pansensitive klebsiella, and the patient was discharged on PO ciprofloxacin and reports compliance with the regimen. She was Seen in optometry clinic and was referred to ophthalmology clinic 04/14 over concerns for endophthalmitis. She was then sent to the ED for CT orbits and admit for likely need for enucleation and evisceration due to the severity of the disease.    She has been having worsening vision and eye swelling for 1 week, denies any fever chills, N/V abdominal pain or diarrhea. She denies any eye truama.      Interval History: Afebrile. Denies HA, but endorses left eye blindness.       Review of Systems   Constitutional:  Positive for activity change. Negative for appetite change, chills and fever.   HENT:  Negative for congestion.    Eyes:  Positive for pain and visual disturbance (left eye). Negative for itching.   Respiratory:  Negative for cough, chest tightness and shortness of breath.    Cardiovascular:  Negative for palpitations and leg swelling.   Gastrointestinal:  Positive for nausea. Negative for abdominal pain and diarrhea.   Endocrine: Negative for polyphagia and polyuria.   Genitourinary:  Negative for dysuria and frequency.   Musculoskeletal:  Negative for back pain.   Neurological:  Negative for numbness and headaches.   Psychiatric/Behavioral:  Negative for agitation and behavioral problems.    Objective:     Vital Signs (Most Recent):  Temp: 98.2 °F (36.8 °C) (04/18/22 0828)  Pulse: (!) 54 (04/18/22 1010)  Resp: 16 (04/18/22 1010)  BP: (!) 130/51 (04/18/22 0828)  SpO2: 96 % (04/18/22 0828)   Vital Signs (24h Range):  Temp:  [97.9 °F (36.6 °C)-98.4 °F (36.9 °C)] 98.2 °F (36.8 °C)  Pulse:   [52-56] 54  Resp:  [14-27] 16  SpO2:  [96 %-98 %] 96 %  BP: (122-160)/(50-70) 130/51     Weight: 81.6 kg (180 lb)  Body mass index is 29.95 kg/m².    Estimated Creatinine Clearance: 8.3 mL/min (A) (based on SCr of 5.5 mg/dL (H)).    Physical Exam  Constitutional:       Appearance: She is well-developed.   HENT:      Head: Normocephalic.   Eyes:      General:         Left eye: Discharge present.     Conjunctiva/sclera:      Left eye: Chemosis present.   Cardiovascular:      Rate and Rhythm: Normal rate and regular rhythm.      Heart sounds: Normal heart sounds. No murmur heard.  Pulmonary:      Effort: Pulmonary effort is normal.      Breath sounds: Normal breath sounds.   Abdominal:      General: Bowel sounds are normal. There is no distension.      Palpations: Abdomen is soft.      Tenderness: There is no abdominal tenderness.   Musculoskeletal:         General: Normal range of motion.   Neurological:      Mental Status: She is alert and oriented to person, place, and time.   Psychiatric:         Behavior: Behavior normal.       Significant Labs:   Microbiology Results (last 7 days)       Procedure Component Value Units Date/Time    Culture, Anaerobe [[id number]] Collected: 04/16/22 1112    Order Status: Completed Specimen: Bone from Toe, Right Foot Updated: 04/18/22 1015     Anaerobic Culture Culture in progress    Aerobic culture [[id number]] Collected: 04/16/22 1112    Order Status: Completed Specimen: Bone from Toe, Right Foot Updated: 04/18/22 0836     Aerobic Bacterial Culture No growth    Blood culture #1 **CANNOT BE ORDERED STAT** [[id number]] Collected: 04/14/22 1728    Order Status: Completed Specimen: Blood from Peripheral, Forearm, Right Updated: 04/17/22 2012     Blood Culture, Routine No Growth to date      No Growth to date      No Growth to date      No Growth to date    Blood culture #2 **CANNOT BE ORDERED STAT** [[id number]] Collected: 04/14/22 1729    Order Status: Completed Specimen: Blood from  Peripheral, Forearm, Right Updated: 04/17/22 2012     Blood Culture, Routine No Growth to date      No Growth to date      No Growth to date      No Growth to date            Significant Imaging: I have reviewed all pertinent imaging results/findings within the past 24 hours.

## 2022-04-18 NOTE — PROGRESS NOTES
Progress Report  Ophthalmology Service    Date: 04/18/2022    Interval hx: on going pain left eye, patient notes fluctuating and is eager to get it removed. Patient's son present in room. Agreed.     POcularHx: NLP left eye    Current eye gtts: Denies     Family Hx: Denies family history of glaucoma, macular degeneration, or blindness. family history includes No Known Problems in her brother, father, maternal aunt, maternal grandfather, maternal grandmother, maternal uncle, mother, paternal aunt, paternal grandfather, paternal grandmother, paternal uncle, and sister.     PMHx:  has a past medical history of DM2 (diabetes mellitus, type 2) (4/14/2022) and HTN (hypertension) (4/14/2022).     PSurgHx:  has a past surgical history that includes Cataract extraction (Bilateral).     Home Medications:   Prior to Admission medications    Medication Sig Start Date End Date Taking? Authorizing Provider   albuterol (PROVENTIL/VENTOLIN HFA) 90 mcg/actuation inhaler Inhale 2 puffs by mouth every 4  to 6 hours as needed 2/3/21   Historical Provider   aspirin (ECOTRIN) 81 MG EC tablet Take 81 mg by mouth once daily.    Historical Provider   blood sugar diagnostic Strp by Misc.(Non-Drug; Combo Route) route    Historical Provider   budesonide-formoterol 160-4.5 mcg (SYMBICORT) 160-4.5 mcg/actuation HFAA Inhale 2 puffs into the lungs every 12 (twelve) hours. Controller    Historical Provider   calcitRIOL (ROCALTROL) 0.25 MCG Cap Take 1 capsule by mouth once daily. 1/12/21   Historical Provider   carvediloL (COREG) 3.125 MG tablet Take 3.125 mg by mouth 2 (two) times daily with meals.    Historical Provider   CIPRO 250 mg tablet Take 250 mg by mouth once daily. 4/8/22   Historical Provider   glimepiride (AMARYL) 4 MG tablet Take 1 tablet by mouth every morning. 5/31/21 5/31/22  Historical Provider   hydrALAZINE (APRESOLINE) 25 MG tablet Take 25 mg by mouth 3 (three) times daily.    Historical Provider   isosorbide mononitrate  (ISMO,MONOKET) 20 MG Tab Take 1 tablet by mouth 2 (two) times daily. 2/3/21   Historical Provider   levothyroxine (SYNTHROID) 150 MCG tablet Take 1 tablet by mouth 30 minutes before breakfast 10/8/20   Historical Provider   montelukast (SINGULAIR) 10 mg tablet Take 1 tablet by mouth nightly. 10/8/20   Historical Provider        Medications this encounter:    sodium chloride 0.9%   Intravenous Once    carvediloL  3.125 mg Oral BID WM    cefTRIAXone (ROCEPHIN) IVPB  2 g Intravenous Q12H    epoetin maximilian-epbx  50 Units/kg Intravenous Every Mon, Wed, Fri    fluticasone furoate-vilanteroL  1 puff Inhalation Daily    hydrALAZINE  25 mg Oral TID    isosorbide mononitrate  20 mg Oral BID    levothyroxine  137 mcg Oral Daily    mupirocin   Nasal BID    pantoprozole (PROTONIX) IV  40 mg Intravenous Q12H    polyethylene glycol  17 g Oral Daily       Allergies: is allergic to amlodipine, atorvastatin, and nebivolol.     Social:  reports that she has never smoked. She has never used smokeless tobacco.     ROS: As per HPI    Ocular examination/Dilated fundus examination:  Base Eye Exam     Tonometry (Pallpation, 2:08 PM)       Right Left    Pressure STP STP          Neuro/Psych     Oriented x3: Yes    Mood/Affect: Normal            Slit Lamp and Fundus Exam     External Exam       Right Left    External Exophthalmos Exophthalmos, Proptosis, Edema          Pen Light Exam       Right Left    Lids/Lashes Blepharitis, MGD Blepharitis, MGD    Conjunctiva/Sclera Pinguecula Pinguecula, 2+ Injection    Cornea Clear Clear    Anterior Chamber Deep and formed 4mm Hypopyon    Iris Round and reactive, (-) NVI Posterior synechiae    Lens PCIOL Pigment deposits, PCIOL    Anterior Vitreous Normal 4+ cell, very poor red reflex                  Assessment/Plan:     1. Endophthalmitis, left eye  - onset about 2-3 weeks ago, NLP at presentation with Dr. Vazquez  - B scan 4/14/22 with fibrin/vit cell  - recent admission to [facility] for UTI  and has been on cipro until this admission  - CT orbit with minimal enhancement at periphery of left globe  - source of bacteremia: urinary vs osteomyelitis of toe - ID following as well  - patient well optimized medically; still receiving iv abx for osteo  - Plan for OR with Dr. Reyna on Wednesday 4/20/22, NPO at midnight  - patient able to be discharged prior if medically optimized      Chaitanya Mcbride MD   [facility]  04/18/2022  12:21 PM

## 2022-04-18 NOTE — ASSESSMENT & PLAN NOTE
- Decreased renal clearance predisposing to decreased metabolism of glimepiride allowing buildup and resultant hypoglycemia  - Avoid insulin stacking, if insulin is used  - Further management per Nephrology

## 2022-04-18 NOTE — ASSESSMENT & PLAN NOTE
81 y/o female with a hx of ESRD on HD MWF vis AV LUE, CAD, HF, HTN, DM2, and recent admission to OSH 4/5-4/8 for Klebsiella pneumoniae bacteremia thought to be 2/2 UTI. Culture results show pansensitive klebsiella, and the patient was discharged on PO ciprofloxacin. Now with eye swelling and vision changes for 1-2 weeks.  Seen by ophthalmology with concern for endophthalmitis - CT orbit with minimal asymmetric enhancement at the periphery of the left globe compared to the right could represent a mild inflammatory or infectious process. Due to lack of trauma and wounds to the eye, expect hematogenous.  Source of her bacteremia could be urinary vs osteomyelitis of toe.  Klebsiella pneumoniae is also known to cause liver abscesses as well.  U/S abdomen with ?right adrenal mass    Recommendations:  -Continue ceftriaxone 2 g IV q 12.  -Follow up Bcx.  -Appreciate ophthalmology input - Plan for OR on 4/20 (evisceration vs enucleation).      no anomalies noted

## 2022-04-18 NOTE — ASSESSMENT & PLAN NOTE
Key History and Diagnostic Findings  - Secondary to glimepiride in the setting of ESRD worsened by poor oral intake  - May take several days for glimepiride to clear from her system  - C-peptide elevated at 14 indicating endogenous insulin production responsible for hypoglycemia; expected with glimepiride and ESRD  - 24 hour glucose trend:  Sustained while on dextrose drip, increased into the low 200s since midnight; dextrose drip discontinued around 7:00 a.m.    Plan  - Hold all diabetes medications at this time   - Awaiting sulfonyurea screen to confirm presence of glimepiride   - If serum glucose remains above 200, utilize low correction scale aspart insulin

## 2022-04-18 NOTE — TELEPHONE ENCOUNTER
----- Message from Myrna Day MD sent at 4/16/2022  1:08 PM CDT -----  Regarding: clinic f/u  Can we please schedule the patient for follow up with me in within the next 3 months? thanks

## 2022-04-18 NOTE — ASSESSMENT & PLAN NOTE
- patient went to [facility] for pedicure two weeks ago and developed ulcer right first toe  - XRAY demonstrates evidence of osteomyelitis   - Continue abx coverage  - consult podiatry -- bedside debridement  - follow up cx and path

## 2022-04-18 NOTE — ASSESSMENT & PLAN NOTE
Patient with x-ray evidence of right foot osteomyelitis s/p bedside I&D by podiatry- bone cultures sent- no path.     Bone cx 4/16 NGT      Recommendation:  -Continue ceftriaxone 2 g IV q 12.   -Appreciate podiatry expertise.  -Will follow up on cultures from toe.

## 2022-04-19 ENCOUNTER — ANESTHESIA EVENT (OUTPATIENT)
Dept: SURGERY | Facility: HOSPITAL | Age: 82
DRG: 113 | End: 2022-04-19
Payer: MEDICARE

## 2022-04-19 LAB
ANION GAP SERPL CALC-SCNC: 16 MMOL/L (ref 8–16)
BACTERIA BLD CULT: NORMAL
BACTERIA BLD CULT: NORMAL
BACTERIA SPEC AEROBE CULT: NO GROWTH
BUN SERPL-MCNC: 55 MG/DL (ref 8–23)
CALCIUM SERPL-MCNC: 8.6 MG/DL (ref 8.7–10.5)
CHLORIDE SERPL-SCNC: 95 MMOL/L (ref 95–110)
CO2 SERPL-SCNC: 20 MMOL/L (ref 23–29)
CREAT SERPL-MCNC: 6.2 MG/DL (ref 0.5–1.4)
ERYTHROCYTE [DISTWIDTH] IN BLOOD BY AUTOMATED COUNT: 14.6 % (ref 11.5–14.5)
EST. GFR  (AFRICAN AMERICAN): 6.7 ML/MIN/1.73 M^2
EST. GFR  (NON AFRICAN AMERICAN): 5.8 ML/MIN/1.73 M^2
GLUCOSE SERPL-MCNC: 116 MG/DL (ref 70–110)
HAV IGM SERPL QL IA: NEGATIVE
HBV CORE IGM SERPL QL IA: NEGATIVE
HBV SURFACE AG SERPL QL IA: NEGATIVE
HCT VFR BLD AUTO: 31.3 % (ref 37–48.5)
HCV AB SERPL QL IA: NEGATIVE
HGB BLD-MCNC: 9.6 G/DL (ref 12–16)
MCH RBC QN AUTO: 29.4 PG (ref 27–31)
MCHC RBC AUTO-ENTMCNC: 30.7 G/DL (ref 32–36)
MCV RBC AUTO: 96 FL (ref 82–98)
PLATELET # BLD AUTO: 266 K/UL (ref 150–450)
PMV BLD AUTO: 9.3 FL (ref 9.2–12.9)
POCT GLUCOSE: 105 MG/DL (ref 70–110)
POCT GLUCOSE: 113 MG/DL (ref 70–110)
POCT GLUCOSE: 121 MG/DL (ref 70–110)
POCT GLUCOSE: 160 MG/DL (ref 70–110)
POCT GLUCOSE: 206 MG/DL (ref 70–110)
POCT GLUCOSE: 96 MG/DL (ref 70–110)
POTASSIUM SERPL-SCNC: 4.9 MMOL/L (ref 3.5–5.1)
RBC # BLD AUTO: 3.26 M/UL (ref 4–5.4)
SODIUM SERPL-SCNC: 131 MMOL/L (ref 136–145)
WBC # BLD AUTO: 10.87 K/UL (ref 3.9–12.7)

## 2022-04-19 PROCEDURE — 99232 SBSQ HOSP IP/OBS MODERATE 35: CPT | Mod: ,,, | Performed by: HOSPITALIST

## 2022-04-19 PROCEDURE — 20600001 HC STEP DOWN PRIVATE ROOM

## 2022-04-19 PROCEDURE — 25000003 PHARM REV CODE 250: Performed by: HOSPITALIST

## 2022-04-19 PROCEDURE — 80074 ACUTE HEPATITIS PANEL: CPT | Performed by: HOSPITALIST

## 2022-04-19 PROCEDURE — 85027 COMPLETE CBC AUTOMATED: CPT | Performed by: HOSPITALIST

## 2022-04-19 PROCEDURE — 63600175 PHARM REV CODE 636 W HCPCS: Performed by: HOSPITALIST

## 2022-04-19 PROCEDURE — 36415 COLL VENOUS BLD VENIPUNCTURE: CPT | Performed by: HOSPITALIST

## 2022-04-19 PROCEDURE — 99232 PR SUBSEQUENT HOSPITAL CARE,LEVL II: ICD-10-PCS | Mod: ,,, | Performed by: HOSPITALIST

## 2022-04-19 PROCEDURE — 90935 HEMODIALYSIS ONE EVALUATION: CPT | Mod: ,,, | Performed by: NURSE PRACTITIONER

## 2022-04-19 PROCEDURE — 63600175 PHARM REV CODE 636 W HCPCS: Performed by: INTERNAL MEDICINE

## 2022-04-19 PROCEDURE — 90935 PR HEMODIALYSIS, ONE EVALUATION: ICD-10-PCS | Mod: ,,, | Performed by: NURSE PRACTITIONER

## 2022-04-19 PROCEDURE — 99233 SBSQ HOSP IP/OBS HIGH 50: CPT | Mod: GC,,, | Performed by: INTERNAL MEDICINE

## 2022-04-19 PROCEDURE — 80100016 HC MAINTENANCE HEMODIALYSIS

## 2022-04-19 PROCEDURE — 99233 PR SUBSEQUENT HOSPITAL CARE,LEVL III: ICD-10-PCS | Mod: GC,,, | Performed by: INTERNAL MEDICINE

## 2022-04-19 PROCEDURE — 80048 BASIC METABOLIC PNL TOTAL CA: CPT | Performed by: HOSPITALIST

## 2022-04-19 PROCEDURE — C9113 INJ PANTOPRAZOLE SODIUM, VIA: HCPCS | Performed by: HOSPITALIST

## 2022-04-19 RX ADMIN — OXYCODONE HYDROCHLORIDE 10 MG: 10 TABLET ORAL at 12:04

## 2022-04-19 RX ADMIN — MUPIROCIN: 20 OINTMENT TOPICAL at 09:04

## 2022-04-19 RX ADMIN — PANTOPRAZOLE SODIUM 40 MG: 40 INJECTION, POWDER, FOR SOLUTION INTRAVENOUS at 12:04

## 2022-04-19 RX ADMIN — CEFTRIAXONE 2 G: 2 INJECTION, SOLUTION INTRAVENOUS at 06:04

## 2022-04-19 RX ADMIN — CARVEDILOL 3.12 MG: 3.12 TABLET, FILM COATED ORAL at 06:04

## 2022-04-19 RX ADMIN — ISOSORBIDE MONONITRATE 20 MG: 20 TABLET ORAL at 09:04

## 2022-04-19 RX ADMIN — PANTOPRAZOLE SODIUM 40 MG: 40 INJECTION, POWDER, FOR SOLUTION INTRAVENOUS at 09:04

## 2022-04-19 RX ADMIN — MUPIROCIN: 20 OINTMENT TOPICAL at 12:04

## 2022-04-19 RX ADMIN — HYDRALAZINE HYDROCHLORIDE 25 MG: 25 TABLET, FILM COATED ORAL at 09:04

## 2022-04-19 RX ADMIN — ISOSORBIDE MONONITRATE 20 MG: 20 TABLET ORAL at 12:04

## 2022-04-19 RX ADMIN — POLYETHYLENE GLYCOL 3350 17 G: 17 POWDER, FOR SOLUTION ORAL at 12:04

## 2022-04-19 RX ADMIN — HYDRALAZINE HYDROCHLORIDE 25 MG: 25 TABLET, FILM COATED ORAL at 12:04

## 2022-04-19 RX ADMIN — CEFTRIAXONE 2 G: 2 INJECTION, SOLUTION INTRAVENOUS at 05:04

## 2022-04-19 RX ADMIN — HYDRALAZINE HYDROCHLORIDE 25 MG: 25 TABLET, FILM COATED ORAL at 03:04

## 2022-04-19 NOTE — PLAN OF CARE
Problem: Diabetes Comorbidity  Goal: Blood Glucose Level Within Targeted Range  Outcome: Ongoing, Progressing     Problem: Skin Injury Risk Increased  Goal: Skin Health and Integrity  Outcome: Ongoing, Progressing     Problem: Hemodynamic Instability (Hemodialysis)  Goal: Effective Tissue Perfusion  Outcome: Ongoing, Progressing

## 2022-04-19 NOTE — ASSESSMENT & PLAN NOTE
Patient with x-ray evidence of right foot osteomyelitis s/p bedside I&D by podiatry- bone cultures sent- no path.     Bone cx 4/16 NGT      Recommendation:  -Continue ceftriaxone 2 g IV q 12.   -Appreciate podiatry input  -Will follow up on cultures from right toe.

## 2022-04-19 NOTE — NURSING
Report received per Awilda Pagan RN.  Patient in bed resting quietly.  No apparent distress noted.  Vital signs stable.  No needs noted.  Bed low, side rails up x3, call light with in reach and bed alarm activated. Care assumed.

## 2022-04-19 NOTE — SUBJECTIVE & OBJECTIVE
Interval History: Afebrile. Denies HA, but endorses left eye blindness.       Review of Systems   Constitutional:  Positive for activity change. Negative for appetite change, chills and fever.   HENT:  Negative for congestion.    Eyes:  Positive for pain and visual disturbance (left eye). Negative for itching.   Respiratory:  Negative for cough, chest tightness and shortness of breath.    Cardiovascular:  Negative for palpitations and leg swelling.   Gastrointestinal:  Positive for nausea. Negative for abdominal pain and diarrhea.   Endocrine: Negative for polyphagia and polyuria.   Genitourinary:  Negative for dysuria and frequency.   Musculoskeletal:  Negative for back pain.   Neurological:  Negative for numbness and headaches.   Psychiatric/Behavioral:  Negative for agitation and behavioral problems.    Objective:     Vital Signs (Most Recent):  Temp: 98.5 °F (36.9 °C) (04/19/22 1100)  Pulse: 65 (04/19/22 1605)  Resp: 11 (04/19/22 1605)  BP: (!) 149/74 (04/19/22 1528)  SpO2: (!) 93 % (04/19/22 1605)   Vital Signs (24h Range):  Temp:  [97.7 °F (36.5 °C)-98.8 °F (37.1 °C)] 98.5 °F (36.9 °C)  Pulse:  [54-71] 65  Resp:  [11-32] 11  SpO2:  [64 %-96 %] 93 %  BP: (112-171)/(50-76) 149/74     Weight: 81.6 kg (180 lb)  Body mass index is 29.95 kg/m².    Estimated Creatinine Clearance: 7.4 mL/min (A) (based on SCr of 6.2 mg/dL (H)).    Physical Exam  Constitutional:       Appearance: She is well-developed.   HENT:      Head: Normocephalic.   Eyes:      General:         Left eye: Discharge present.     Conjunctiva/sclera:      Left eye: Chemosis present.   Cardiovascular:      Rate and Rhythm: Normal rate and regular rhythm.      Heart sounds: Normal heart sounds. No murmur heard.  Pulmonary:      Effort: Pulmonary effort is normal.      Breath sounds: Normal breath sounds.   Abdominal:      General: Bowel sounds are normal. There is no distension.      Palpations: Abdomen is soft.      Tenderness: There is no abdominal  tenderness.   Musculoskeletal:         General: Normal range of motion.   Neurological:      Mental Status: She is alert and oriented to person, place, and time.   Psychiatric:         Behavior: Behavior normal.       Significant Labs:   Microbiology Results (last 7 days)       Procedure Component Value Units Date/Time    Aerobic culture [[id number]] Collected: 04/16/22 1112    Order Status: Completed Specimen: Bone from Toe, Right Foot Updated: 04/19/22 0848     Aerobic Bacterial Culture No growth    Blood culture #1 **CANNOT BE ORDERED STAT** [[id number]] Collected: 04/14/22 1728    Order Status: Completed Specimen: Blood from Peripheral, Forearm, Right Updated: 04/18/22 2012     Blood Culture, Routine No Growth to date      No Growth to date      No Growth to date      No Growth to date      No Growth to date    Blood culture #2 **CANNOT BE ORDERED STAT** [[id number]] Collected: 04/14/22 1729    Order Status: Completed Specimen: Blood from Peripheral, Forearm, Right Updated: 04/18/22 2012     Blood Culture, Routine No Growth to date      No Growth to date      No Growth to date      No Growth to date      No Growth to date    Culture, Anaerobe [[id number]] Collected: 04/16/22 1112    Order Status: Completed Specimen: Bone from Toe, Right Foot Updated: 04/18/22 1015     Anaerobic Culture Culture in progress            Significant Imaging: I have reviewed all pertinent imaging results/findings within the past 24 hours.

## 2022-04-19 NOTE — PLAN OF CARE
AOx4. Left eye swollen. Wound care done to right great toe. Blood glucose monitored. Left arm AV fistula bruit and thrill present; dressing intact). Midline dressing intact. Plan of care reviewed with patient.    Problem: Adult Inpatient Plan of Care  Goal: Plan of Care Review  Outcome: Ongoing, Progressing  Goal: Optimal Comfort and Wellbeing  Outcome: Ongoing, Progressing     Problem: Diabetes Comorbidity  Goal: Blood Glucose Level Within Targeted Range  Outcome: Ongoing, Progressing     Problem: Infection  Goal: Absence of Infection Signs and Symptoms  Outcome: Ongoing, Progressing

## 2022-04-19 NOTE — ANESTHESIA PREPROCEDURE EVALUATION
[facility]  Anesthesia Pre-Operative Evaluation         Patient Name: Erika Perera  YOB: 1940  MRN: [id number]    SUBJECTIVE:     Pre-operative Evaluation for Procedure(s) (LRB):  EVISCERATION, OCULAR CONTENTS (Left)  CONJUNCTIVOPLASTY (Left)  BLEPHARORRHAPHY (Left)  INJECTION, MEDICATION, RETROBULBAR (Left)     04/19/2022    Erika Perera is a 82 y.o. female with a PMHx significant for ESRD on HD, CAD, chronic diastolic heart failure, HTN, DM2, and HLD admitted for endophthalmitis, recent bacteremia (suspected cause of endophthalmitis, and GI bleed s/p 3U PRBC.    The patient now presents for the above procedure(s).    TTE 4/14/22:  · Severe left atrial enlargement.  · The left ventricle is normal in size with concentric remodeling and normal systolic function.  · The estimated ejection fraction is 60%.  · Grade II left ventricular diastolic dysfunction.  · Moderate right atrial enlargement.  · Mild right ventricular enlargement with normal right ventricular systolic function.  · Mild mitral regurgitation.  · Moderate to severe tricuspid regurgitation.  · Elevated central venous pressure (15 mmHg).  · There is pulmonary hypertension. The estimated PA systolic pressure is 65 mmHg    Previous Airway: None documented.    LDA:        Hemodialysis AV Graft Left upper arm (Active)   Site Assessment Clean;Dry;Intact;No redness;No swelling;No warmth;No drainage 04/18/22 2021   Patency Thrill;Bruit 04/18/22 2021   Dressing Intervention Integrity maintained 04/18/22 2021   Dressing Status Clean;Dry;Intact 04/18/22 2021   Site Condition No complications 04/18/22 2021   Number of days:             Peripheral IV - Single Lumen 04/14/22 20 G Right Forearm (Active)   Site Assessment Clean;Dry;Intact;No redness;No swelling;No warmth;No drainage 04/19/22 0400   Extremity Assessment Distal to IV No abnormal discoloration;No redness;No swelling;No warmth 04/18/22 2021   Line Status  Flushed;Saline locked 04/18/22 2021   Dressing Status Clean;Dry;Intact 04/18/22 2021   Dressing Intervention Integrity maintained 04/18/22 2021   Dressing Change Due 04/18/22 04/16/22 1423   Site Change Due 04/18/22 04/16/22 1423   Number of days: 5            Midline Catheter Insertion/Assessment  - Single Lumen 04/16/22 1915 Right basilic vein (medial side of arm) other (see comments) (Active)   Site Assessment No warmth;No redness;No swelling;Dry;Intact 04/18/22 2021   IV Device Securement catheter securement device 04/18/22 2021   Line Status Blood return noted;Flushed;Saline locked 04/18/22 2021   Extremity Circumference (cm) 27 cm 04/16/22 1915   Dressing Type Biopatch in place 04/18/22 2021   Dressing Status Old drainage 04/18/22 2021   Dressing Intervention Integrity maintained 04/18/22 2021   Dressing Change Due 04/23/22 04/18/22 2021   Number of days: 2       Drips: None documented.      Patient Active Problem List   Diagnosis    Pseudophakia    Refractive error    Endophthalmitis    ESRD (end stage renal disease)    CAD (coronary artery disease)    DM2 (diabetes mellitus, type 2)    HTN (hypertension)    Bacteremia due to Klebsiella pneumoniae    Hypoglycemia    Gastrointestinal bleeding    Subacute osteomyelitis of right foot    Secondary hypothyroidism    Adrenal mass, right       Review of patient's allergies indicates:   Allergen Reactions    Amlodipine Other (See Comments)    Atorvastatin Other (See Comments)    Nebivolol Other (See Comments)       Current Inpatient Medications:      Current Outpatient Medications   Medication Instructions    albuterol (PROVENTIL/VENTOLIN HFA) 90 mcg/actuation inhaler Inhale 2 puffs by mouth every 4  to 6 hours as needed    aspirin (ECOTRIN) 81 mg, Oral, Daily    blood sugar diagnostic Strp by Misc.(Non-Drug; Combo Route) route    budesonide-formoterol 160-4.5 mcg (SYMBICORT) 160-4.5 mcg/actuation HFAA 2 puffs, Inhalation, Every 12 hours,  Controller    calcitRIOL (ROCALTROL) 0.25 MCG Cap 1 capsule, Oral, Daily    carvediloL (COREG) 3.125 mg, Oral, 2 times daily with meals    CIPRO 250 mg, Oral, Daily    glimepiride (AMARYL) 4 MG tablet 1 tablet, Oral, Every morning    hydrALAZINE (APRESOLINE) 25 mg, Oral, 3 times daily    isosorbide mononitrate (ISMO,MONOKET) 20 MG Tab 1 tablet, Oral, 2 times daily    levothyroxine (SYNTHROID) 150 MCG tablet Take 1 tablet by mouth 30 minutes before breakfast    montelukast (SINGULAIR) 10 mg tablet 1 tablet, Oral, Nightly       Past Surgical History:   Procedure Laterality Date    CATARACT EXTRACTION Bilateral        Social History     Substance and Sexual Activity   Drug Use Not on file     Tobacco Use: Low Risk     Smoking Tobacco Use: Never Smoker    Smokeless Tobacco Use: Never Used     Alcohol Use: Not on file         OBJECTIVE:     Vital Signs Range (Last 24H):  Temp:  [36.5 °C (97.7 °F)-37.1 °C (98.8 °F)]   Pulse:  [53-62]   Resp:  [12-32]   BP: (122-154)/(51-76)   SpO2:  [94 %-97 %]       Significant Labs    Heme Profile  Lab Results   Component Value Date    WBC 10.87 04/19/2022    HGB 9.6 (L) 04/19/2022    HCT 31.3 (L) 04/19/2022     04/19/2022       Coagulation Studies  Lab Results   Component Value Date    LABPROT 11.9 04/16/2022    INR 1.1 04/16/2022    APTT 26.1 04/16/2022       BMP  Lab Results   Component Value Date     (L) 04/19/2022    K 4.9 04/19/2022    CL 95 04/19/2022    CO2 20 (L) 04/19/2022    BUN 55 (H) 04/19/2022    CREATININE 6.2 (H) 04/19/2022    MG 2.0 04/15/2022    PHOS 4.1 04/15/2022       Liver Function Tests  Lab Results   Component Value Date    AST 27 04/18/2022    ALT 6 (L) 04/18/2022    ALKPHOS 100 04/18/2022    BILITOT 1.0 04/18/2022    PROT 6.5 04/18/2022    ALBUMIN 2.1 (L) 04/18/2022       Lipid Profile  No results found for: CHOL, HDL, LDLDIRECT, TRIG    Endocrine Profile  Lab Results   Component Value Date    HGBA1C 5.0 04/14/2022    TSH 1.126  04/17/2022       Cardiac Studies    EKG:   Results for orders placed or performed during the hospital encounter of 04/14/22   EKG 12-lead    Collection Time: 04/15/22  1:05 AM    Narrative    Test Reason : R78.81,    Vent. Rate : 063 BPM     Atrial Rate : 063 BPM     P-R Int : 210 ms          QRS Dur : 102 ms      QT Int : 430 ms       P-R-T Axes : 041 -16 133 degrees     QTc Int : 440 ms    Sinus rhythm with 1st degree A-V block  Low anterior forces-probable Anterior infarct ,age undetermined  Abnormal ECG  No previous ECGs available  Confirmed by Jason HERRERA MD ([id number]) on 4/15/2022 9:26:13 AM    Referred By: [email or web address]   SELF           Confirmed By:Jason HERRERA MD       KATI  No results found for this or any previous visit.      ASSESSMENT/PLAN:       Pre-op Assessment    I have reviewed the Patient Summary Reports.     I have reviewed the Nursing Notes.    I have reviewed the Medications.     Review of Systems  Anesthesia Hx:  Denies Hx of Anesthetic complications    Hematology/Oncology:  Hematology Normal   Oncology Normal     EENT/Dental:EENT/Dental Normal   Cardiovascular:   Hypertension CAD      Pulmonary:  Pulmonary Normal    Renal/:   Chronic Renal Disease, ESRD    Hepatic/GI:  Hepatic/GI Normal    Musculoskeletal:  Musculoskeletal Normal    Neurological:  Neurology Normal    Endocrine:   Diabetes        Physical Exam  General: Well nourished    Airway:  Mallampati: III / II  Mouth Opening: Normal  TM Distance: Normal  Tongue: Normal  Neck ROM: Normal ROM    Dental:  Periodontal disease    Chest/Lungs:  Clear to auscultation, Normal Respiratory Rate    Heart:  Rate: Normal  Rhythm: Regular Rhythm  Sounds: Normal    Abdomen:  Normal, Nontender        Anesthesia Plan  Type of Anesthesia, risks & benefits discussed:    Anesthesia Type: Gen ETT  Intra-op Monitoring Plan: Standard ASA Monitors  Post Op Pain Control Plan: multimodal analgesia and IV/PO Opioids PRN  Induction:  IV  Airway Plan: Direct,  Post-Induction  Informed Consent: Informed consent signed with the Patient representative and all parties understand the risks and agree with anesthesia plan.  All questions answered.   ASA Score: 3  Day of Surgery Review of History & Physical: H&P Update referred to the surgeon/provider.    Ready For Surgery From Anesthesia Perspective.     .

## 2022-04-19 NOTE — PROGRESS NOTES
[facility] NOTE    Erika Perera is a 82 y.o. female currently on hemodialysis for removal of uremic toxins and volume.     Patient seen and evaluated on hemodialysis, tolerating treatment, see HD flowsheet for vitals and assessments.    No Hypotension, chest pain, shortness of breath, cramping, nausea or vomiting.      Labs have been reviewed and the dialysate bath has been adjusted.    Labs:      Recent Labs   Lab 04/15/22  0238 04/16/22  0357 04/17/22  0400 04/18/22 0448 04/19/22  0416   *   < > 131* 133* 131*   K 4.2   < > 4.7 4.8 4.9   CL 95   < > 97 96 95   CO2 26   < > 21* 24 20*   BUN 38*   < > 26* 41* 55*   CREATININE 4.7*   < > 3.9* 5.5* 6.2*   CALCIUM 8.7   < > 8.6* 8.8 8.6*   PHOS 4.1  --   --   --   --     < > = values in this interval not displayed.       Recent Labs   Lab 04/17/22  0400 04/18/22 0448 04/19/22  0416   WBC 15.18* 9.21 10.87   HGB 10.3* 9.8* 9.6*   HCT 31.7* 32.2* 31.3*    271 266        Assessment/Plan    Ultrafiltration goal: 2 L as tolerated, keep MAP >65  - Seen on dialysis today, tolerating session with current UFR, no complications.    - Procedure today with Dr. Reyna this weekend for L eye endophthalmitis   - No lab stick/BP intake on access site  - Will continue dialysis treatments while in-patient  - Continue to monitor intake and output, daily weights   - Avoid nephrotoxic medication and renal dose medications to GFR    Anemia of ESRD  - Continue MOHAMUD with dialysis treatments  - Hgb 9.6, near target     BMM  - Renal diet with protein intake goal 1.5 g/kg/d  - Novasource with meals  - Daily renal function panel     Annmarie Rg DNP, APRN, FNP-C  Nephrology Department  Pager:  [phone]

## 2022-04-19 NOTE — PROGRESS NOTES
HD TX complete blood returned, lines pulled and pressure held until hemostasis achieved X2. Report to primary RN   04/19/22 1100   Handoff Report   Received From Ashlee   Given To Awilda   Vital Signs   Temp 98.5 °F (36.9 °C)   Temp src Oral   Pulse 68   Heart Rate Source Monitor   Resp 12   O2 Device (Oxygen Therapy) room air   BP (!) 112/50   MAP (mmHg) 72   BP Location Right leg   BP Method Automatic   Patient Position Lying   Orthostatic VS No   Assessments (Pre/Post)   Consent Obtained yes   Safety vein preservation armband present   Date Hepatitis Profile Obtained 04/19/22   Blood Liters Processed (BLP) 57.3   Transport Modality bed   Level of Consciousness (AVPU) alert   Dialyzer Clearance moderately streaked   Pain   Preferred Pain Scale number (Numeric Rating Pain Scale)   Pain Rating (0-10): Rest 0   Pain Rating (0-10): Activity 0   FACES Pain Rating: Rest 0-->no hurt   Pre-Hemodialysis Assessment   Treatment Status Completed        Hemodialysis AV Graft Left upper arm   No placement date or time found.   Present Prior to Hospital Arrival?: Yes  Location: Left upper arm   Needle Size 15ga   Site Assessment Clean;Dry;Intact;No redness;No swelling   Patency Present;Thrill;Bruit   Status Deaccessed   Dressing Intervention First dressing   Dressing Status Clean;Dry;Intact   Site Condition No complications   Dressing Gauze   During Hemodialysis Assessment   Blood Flow Rate (mL/min) 350 mL/min   Dialysate Flow Rate (mL/min) 800 ml/min   Ultrafiltration Rate (mL/Hr) 940 mL/Hr   Arteriovenous Lines Secure Yes   Arterial Pressure (mmHg) -170 mmHg   Venous Pressure (mmHg) 150   Blood Volume Processed (Liters) 57.3 L   UF Removed (mL) 2717 mL   TMP 60   Venous Line in Air Detector Yes   Intake (mL) 550 mL   Transducer Dry Yes   Access Visible Yes    notified of access issue? N/A   Heparin given? N/A   Intra-Hemodialysis Comments HD TX complete blood returned   Post-Hemodialysis Assessment   Rinseback Volume (mL)  250 mL   Blood Volume Processed (Liters) 57.3 L   Dialyzer Clearance Moderately streaked   Duration of Treatment (minutes) 180 minutes   Additional Fluid Intake (mL) 800 mL   Total UF (mL) 2800 mL   Net Fluid Removal 2000   Patient Response to Treatment Tolerated well   Arterial bleeding stop time (min) 10 min   Venous bleeding stop time (min) 10 min   Post-Hemodialysis Comments HD TX complete

## 2022-04-19 NOTE — ASSESSMENT & PLAN NOTE
83 y/o female with a hx of ESRD on HD MWF vis AV LUE, CAD, HF, HTN, DM2, and recent admission to OSH 4/5-4/8 for Klebsiella pneumoniae bacteremia thought to be 2/2 UTI. Culture results show pansensitive klebsiella, and the patient was discharged on PO ciprofloxacin. Now with eye swelling and vision changes for 1-2 weeks.  Seen by ophthalmology with concern for endophthalmitis - CT orbit with minimal asymmetric enhancement at the periphery of the left globe compared to the right could represent a mild inflammatory or infectious process. Due to lack of trauma and wounds to the eye, expect hematogenous.  Source of her bacteremia could be urinary vs osteomyelitis of toe.  Klebsiella pneumoniae is also known to cause liver abscesses as well.  U/S abdomen with ?right adrenal mass    Recommendations:  -Continue ceftriaxone 2 g IV q 12.  -Follow up Bcx.  -Appreciate ophthalmology input - Plan for OR on 4/20 (evisceration vs enucleation).

## 2022-04-19 NOTE — PROGRESS NOTES
[facility] ([place] [address])  Infectious Disease  Progress Note    Patient Name: Erika Perera  MRN: [id number]  Admission Date: 4/14/2022  Length of Stay: 5 days  Attending Physician: Ila Garcia MD  Primary Care Provider: Minor Bennett MD    Isolation Status: No active isolations  Assessment/Plan:      * Endophthalmitis  83 y/o female with a hx of ESRD on HD MWF vis AV LUE, CAD, HF, HTN, DM2, and recent admission to OSH 4/5-4/8 for Klebsiella pneumoniae bacteremia thought to be 2/2 UTI. Culture results show pansensitive klebsiella, and the patient was discharged on PO ciprofloxacin. Now with eye swelling and vision changes for 1-2 weeks.  Seen by ophthalmology with concern for endophthalmitis - CT orbit with minimal asymmetric enhancement at the periphery of the left globe compared to the right could represent a mild inflammatory or infectious process. Due to lack of trauma and wounds to the eye, expect hematogenous.  Source of her bacteremia could be urinary vs osteomyelitis of toe.  Klebsiella pneumoniae is also known to cause liver abscesses as well.  U/S abdomen with ?right adrenal mass    Recommendations:  -Continue ceftriaxone 2 g IV q 12.  -Follow up Bcx.  -Appreciate ophthalmology input - Plan for OR on 4/20 (evisceration vs enucleation).       Subacute osteomyelitis of right foot  Patient with x-ray evidence of right foot osteomyelitis s/p bedside I&D by podiatry- bone cultures sent- no path.     Bone cx 4/16 NGT      Recommendation:  -Continue ceftriaxone 2 g IV q 12.   -Appreciate podiatry input  -Will follow up on cultures from right toe.                Anticipated Disposition: defer to primary team     Thank you for your consult. I will follow-up with patient. Please contact us if you have any additional questions.    Poncho Benavidez MD  Infectious Disease  [facility] ([place] [address])    Subjective:     Principal Problem:Endophthalmitis    HPI: Erika  Trever is an 83 y/o female with a hx of ESRD on HD MWF vis AV LUE, CAD, HF, HTN, DM2, and HLD who presents to the ED from ophthalmology clinic for L eye pain and swelling x 1 week.     Patient was recently admitted to OSH 4/5-4/8 for Klebsiella pneumoniae bacteremia thought to be 2/2 UTI. Culture results show pansensitive klebsiella, and the patient was discharged on PO ciprofloxacin and reports compliance with the regimen. She was Seen in optometry clinic and was referred to ophthalmology clinic 04/14 over concerns for endophthalmitis. She was then sent to the ED for CT orbits and admit for likely need for enucleation and evisceration due to the severity of the disease.    She has been having worsening vision and eye swelling for 1 week, denies any fever chills, N/V abdominal pain or diarrhea. She denies any eye truama.      Interval History: Afebrile. Denies HA, but endorses left eye blindness.       Review of Systems   Constitutional:  Positive for activity change. Negative for appetite change, chills and fever.   HENT:  Negative for congestion.    Eyes:  Positive for pain and visual disturbance (left eye). Negative for itching.   Respiratory:  Negative for cough, chest tightness and shortness of breath.    Cardiovascular:  Negative for palpitations and leg swelling.   Gastrointestinal:  Positive for nausea. Negative for abdominal pain and diarrhea.   Endocrine: Negative for polyphagia and polyuria.   Genitourinary:  Negative for dysuria and frequency.   Musculoskeletal:  Negative for back pain.   Neurological:  Negative for numbness and headaches.   Psychiatric/Behavioral:  Negative for agitation and behavioral problems.    Objective:     Vital Signs (Most Recent):  Temp: 98.5 °F (36.9 °C) (04/19/22 1100)  Pulse: 65 (04/19/22 1605)  Resp: 11 (04/19/22 1605)  BP: (!) 149/74 (04/19/22 1528)  SpO2: (!) 93 % (04/19/22 1605)   Vital Signs (24h Range):  Temp:  [97.7 °F (36.5 °C)-98.8 °F (37.1 °C)] 98.5 °F (36.9  °C)  Pulse:  [54-71] 65  Resp:  [11-32] 11  SpO2:  [64 %-96 %] 93 %  BP: (112-171)/(50-76) 149/74     Weight: 81.6 kg (180 lb)  Body mass index is 29.95 kg/m².    Estimated Creatinine Clearance: 7.4 mL/min (A) (based on SCr of 6.2 mg/dL (H)).    Physical Exam  Constitutional:       Appearance: She is well-developed.   HENT:      Head: Normocephalic.   Eyes:      General:         Left eye: Discharge present.     Conjunctiva/sclera:      Left eye: Chemosis present.   Cardiovascular:      Rate and Rhythm: Normal rate and regular rhythm.      Heart sounds: Normal heart sounds. No murmur heard.  Pulmonary:      Effort: Pulmonary effort is normal.      Breath sounds: Normal breath sounds.   Abdominal:      General: Bowel sounds are normal. There is no distension.      Palpations: Abdomen is soft.      Tenderness: There is no abdominal tenderness.   Musculoskeletal:         General: Normal range of motion.   Neurological:      Mental Status: She is alert and oriented to person, place, and time.   Psychiatric:         Behavior: Behavior normal.       Significant Labs:   Microbiology Results (last 7 days)       Procedure Component Value Units Date/Time    Aerobic culture [[id number]] Collected: 04/16/22 1112    Order Status: Completed Specimen: Bone from Toe, Right Foot Updated: 04/19/22 0848     Aerobic Bacterial Culture No growth    Blood culture #1 **CANNOT BE ORDERED STAT** [[id number]] Collected: 04/14/22 1728    Order Status: Completed Specimen: Blood from Peripheral, Forearm, Right Updated: 04/18/22 2012     Blood Culture, Routine No Growth to date      No Growth to date      No Growth to date      No Growth to date      No Growth to date    Blood culture #2 **CANNOT BE ORDERED STAT** [[id number]] Collected: 04/14/22 1729    Order Status: Completed Specimen: Blood from Peripheral, Forearm, Right Updated: 04/18/22 2012     Blood Culture, Routine No Growth to date      No Growth to date      No Growth to date      No  Growth to date      No Growth to date    Culture, Anaerobe [[id number]] Collected: 04/16/22 1112    Order Status: Completed Specimen: Bone from Toe, Right Foot Updated: 04/18/22 1015     Anaerobic Culture Culture in progress            Significant Imaging: I have reviewed all pertinent imaging results/findings within the past 24 hours.

## 2022-04-19 NOTE — PLAN OF CARE
04/19/22 0928   Post-Acute Status   Post-Acute Authorization Home Health   Home Health Status Referrals Sent   Coverage [employer]   Hospital Resources/Appts/Education Provided Appointments scheduled and added to AVS   Discharge Delays None known at this time   Discharge Plan   Discharge Plan A Home with family;Home Health   Discharge Plan B Home Health;Home with family     SW spoke with Shweta LÓPEZ. They reported that they will resume services with patient once discharged. Forwarded them a referral in [facility] and advised that the discharge date is 04/21/2022.    Adwoa Saldana LMSW  [phone]

## 2022-04-19 NOTE — PROGRESS NOTES
PT Arrived to [facility] on bed, in NAD, on RA, AAOX4, VSS. Maintenance HD TX initiated via 15G needles to RYAN AVF with a 2L UF goal over 3hours as tolerated. [facility]    04/19/22 0810   Handoff Report   Received From Awilda   Lizette Rosado   Treatment Type   Treatment Type Maintenance   Vital Signs   Temp 98.8 °F (37.1 °C)   Temp src Oral   Pulse (!) 58   Heart Rate Source Monitor;Continuous   Resp 16   O2 Device (Oxygen Therapy) room air   BP (!) 148/63   MAP (mmHg) 91   BP Location Right leg   BP Method Automatic   Patient Position Lying   Assessments (Pre/Post)   Consent Obtained yes   Safety vein preservation armband present   Date Hepatitis Profile Obtained 04/19/22   Blood Liters Processed (BLP) 0   Transport Modality bed   Level of Consciousness (AVPU) alert   Pain   Preferred Pain Scale number (Numeric Rating Pain Scale)   Pain Rating (0-10): Rest 0   Pain Rating (0-10): Activity 0   Pre-Hemodialysis Assessment   Patient Status Arrived   Treatment Consent Verified Yes   Treatment Status Started   Gross Bleach Negative Yes   Total Chlorine is less than 0.1 ppm Yes   Machine Number K17   Alarms Verified Yes   Reverse Osmosis Number Main   Extracorporeal Circuit Tested for Integrity Yes   pH 7   Machine Temperature 98.6 °F (37 °C)   Dialyzer F-160   Machine Conductivity 13.9   Meter Conductivity 14   UF Profile 4   Dialysate Na (mEq/L) 140   Dialysate K (mEq/L) 2   Dialysate CA (mEq/L) 2.5   Dialysate HCO3 (mEq/L) 35   Prime Ordered (mL) 250 mL   Treatment Initiation with Dialysis Precautions All connections secured;Saline line double clamped;Venous parameters set;Arterial parameters set;Prime given;Air foam detector engaged   Prime Amount Given (mL) 250 mL   Net UF Goal 2000   Total UF Goal 2800   Pre-Hemodialysis Comments HD TX initiated   UF Rate 1210   RO # / DI #  Main   Timeout Performed 0800        Hemodialysis AV Graft Left upper arm   No placement date or time found.   Present Prior to Hospital Arrival?: Yes   Location: Left upper arm   Needle Size 15ga   Site Assessment Clean;Dry;Intact;No redness;No swelling   Patency Present;Thrill;Bruit   Status Accessed   Flows Good   Site Condition No complications   During Hemodialysis Assessment   Blood Flow Rate (mL/min) 350 mL/min   Dialysate Flow Rate (mL/min) 800 ml/min   Ultrafiltration Rate (mL/Hr) 1210 mL/Hr   Arteriovenous Lines Secure Yes   Arterial Pressure (mmHg) -150 mmHg   Venous Pressure (mmHg) 130   Blood Volume Processed (Liters) 0 L   UF Removed (mL) 0 mL   TMP 70   Venous Line in Air Detector Yes   Intake (mL) 250 mL   Transducer Dry Yes   Access Visible Yes    notified of access issue? N/A   Heparin given? N/A   Intra-Hemodialysis Comments HD TX intiated

## 2022-04-19 NOTE — PLAN OF CARE
Problem: Device-Related Complication Risk (Hemodialysis)  Goal: Safe, Effective Therapy Delivery  Outcome: Ongoing, Progressing  Intervention: Optimize Device Care and Function  Flowsheets (Taken 4/19/2022 9078)  Circuit Management: air detection alarms on

## 2022-04-20 ENCOUNTER — ANESTHESIA (OUTPATIENT)
Dept: SURGERY | Facility: HOSPITAL | Age: 82
DRG: 113 | End: 2022-04-20
Payer: MEDICARE

## 2022-04-20 LAB
ANION GAP SERPL CALC-SCNC: 12 MMOL/L (ref 8–16)
BASOPHILS # BLD AUTO: 0.02 K/UL (ref 0–0.2)
BASOPHILS NFR BLD: 0.2 % (ref 0–1.9)
BUN SERPL-MCNC: 36 MG/DL (ref 8–23)
CALCIUM SERPL-MCNC: 8.4 MG/DL (ref 8.7–10.5)
CHLORIDE SERPL-SCNC: 97 MMOL/L (ref 95–110)
CO2 SERPL-SCNC: 26 MMOL/L (ref 23–29)
CREAT SERPL-MCNC: 4.7 MG/DL (ref 0.5–1.4)
DIFFERENTIAL METHOD: ABNORMAL
EOSINOPHIL # BLD AUTO: 0.1 K/UL (ref 0–0.5)
EOSINOPHIL NFR BLD: 0.6 % (ref 0–8)
ERYTHROCYTE [DISTWIDTH] IN BLOOD BY AUTOMATED COUNT: 14.4 % (ref 11.5–14.5)
EST. GFR  (AFRICAN AMERICAN): 9.3 ML/MIN/1.73 M^2
EST. GFR  (NON AFRICAN AMERICAN): 8.1 ML/MIN/1.73 M^2
GLUCOSE SERPL-MCNC: 95 MG/DL (ref 70–110)
HCT VFR BLD AUTO: 31.2 % (ref 37–48.5)
HGB BLD-MCNC: 9.9 G/DL (ref 12–16)
IMM GRANULOCYTES # BLD AUTO: 0.05 K/UL (ref 0–0.04)
IMM GRANULOCYTES NFR BLD AUTO: 0.5 % (ref 0–0.5)
LYMPHOCYTES # BLD AUTO: 0.9 K/UL (ref 1–4.8)
LYMPHOCYTES NFR BLD: 9.4 % (ref 18–48)
MCH RBC QN AUTO: 29.5 PG (ref 27–31)
MCHC RBC AUTO-ENTMCNC: 31.7 G/DL (ref 32–36)
MCV RBC AUTO: 93 FL (ref 82–98)
MONOCYTES # BLD AUTO: 0.5 K/UL (ref 0.3–1)
MONOCYTES NFR BLD: 5.7 % (ref 4–15)
NEUTROPHILS # BLD AUTO: 7.7 K/UL (ref 1.8–7.7)
NEUTROPHILS NFR BLD: 83.6 % (ref 38–73)
NRBC BLD-RTO: 0 /100 WBC
PLATELET # BLD AUTO: 268 K/UL (ref 150–450)
PMV BLD AUTO: 9.1 FL (ref 9.2–12.9)
POCT GLUCOSE: 101 MG/DL (ref 70–110)
POCT GLUCOSE: 110 MG/DL (ref 70–110)
POCT GLUCOSE: 129 MG/DL (ref 70–110)
POCT GLUCOSE: 88 MG/DL (ref 70–110)
POCT GLUCOSE: 89 MG/DL (ref 70–110)
POTASSIUM SERPL-SCNC: 4.1 MMOL/L (ref 3.5–5.1)
RBC # BLD AUTO: 3.36 M/UL (ref 4–5.4)
SODIUM SERPL-SCNC: 135 MMOL/L (ref 136–145)
WBC # BLD AUTO: 9.26 K/UL (ref 3.9–12.7)

## 2022-04-20 PROCEDURE — 25000003 PHARM REV CODE 250: Performed by: NURSE ANESTHETIST, CERTIFIED REGISTERED

## 2022-04-20 PROCEDURE — C9113 INJ PANTOPRAZOLE SODIUM, VIA: HCPCS | Performed by: HOSPITALIST

## 2022-04-20 PROCEDURE — 88312 SPECIAL STAINS GROUP 1: CPT | Mod: 26,,, | Performed by: STUDENT IN AN ORGANIZED HEALTH CARE EDUCATION/TRAINING PROGRAM

## 2022-04-20 PROCEDURE — 65093 REVISE EYE WITH IMPLANT: CPT | Mod: LT,,, | Performed by: OPHTHALMOLOGY

## 2022-04-20 PROCEDURE — 71000015 HC POSTOP RECOV 1ST HR: Performed by: OPHTHALMOLOGY

## 2022-04-20 PROCEDURE — 99232 SBSQ HOSP IP/OBS MODERATE 35: CPT | Mod: ,,, | Performed by: HOSPITALIST

## 2022-04-20 PROCEDURE — 67875 PR TEMP CLOSURE EYELID BY SUTURE: ICD-10-PCS | Mod: 51,LT,, | Performed by: OPHTHALMOLOGY

## 2022-04-20 PROCEDURE — 67875 CLOSURE OF EYELID BY SUTURE: CPT | Mod: 51,LT,, | Performed by: OPHTHALMOLOGY

## 2022-04-20 PROCEDURE — D9220A PRA ANESTHESIA: Mod: ANES,,, | Performed by: ANESTHESIOLOGY

## 2022-04-20 PROCEDURE — 63600175 PHARM REV CODE 636 W HCPCS: Performed by: HOSPITALIST

## 2022-04-20 PROCEDURE — 63600175 PHARM REV CODE 636 W HCPCS: Performed by: INTERNAL MEDICINE

## 2022-04-20 PROCEDURE — 25000003 PHARM REV CODE 250: Performed by: HOSPITALIST

## 2022-04-20 PROCEDURE — 82962 GLUCOSE BLOOD TEST: CPT | Performed by: OPHTHALMOLOGY

## 2022-04-20 PROCEDURE — 65093 PR REMOVE OCULAR CONTENTS W IMPLANT: ICD-10-PCS | Mod: LT,,, | Performed by: OPHTHALMOLOGY

## 2022-04-20 PROCEDURE — 63600175 PHARM REV CODE 636 W HCPCS: Performed by: NURSE ANESTHETIST, CERTIFIED REGISTERED

## 2022-04-20 PROCEDURE — 36000706: Performed by: OPHTHALMOLOGY

## 2022-04-20 PROCEDURE — 87186 SC STD MICRODIL/AGAR DIL: CPT | Performed by: OPHTHALMOLOGY

## 2022-04-20 PROCEDURE — 71000039 HC RECOVERY, EACH ADD'L HOUR: Performed by: OPHTHALMOLOGY

## 2022-04-20 PROCEDURE — 85025 COMPLETE CBC W/AUTO DIFF WBC: CPT | Performed by: HOSPITALIST

## 2022-04-20 PROCEDURE — 99900035 HC TECH TIME PER 15 MIN (STAT)

## 2022-04-20 PROCEDURE — 25000003 PHARM REV CODE 250: Performed by: OPHTHALMOLOGY

## 2022-04-20 PROCEDURE — 71000033 HC RECOVERY, INTIAL HOUR: Performed by: OPHTHALMOLOGY

## 2022-04-20 PROCEDURE — 88312 SPECIAL STAINS GROUP 1: CPT | Performed by: STUDENT IN AN ORGANIZED HEALTH CARE EDUCATION/TRAINING PROGRAM

## 2022-04-20 PROCEDURE — L8610 OCULAR IMPLANT: HCPCS | Performed by: OPHTHALMOLOGY

## 2022-04-20 PROCEDURE — 25000003 PHARM REV CODE 250

## 2022-04-20 PROCEDURE — D9220A PRA ANESTHESIA: ICD-10-PCS | Mod: ANES,,, | Performed by: ANESTHESIOLOGY

## 2022-04-20 PROCEDURE — 88307 TISSUE EXAM BY PATHOLOGIST: CPT | Performed by: STUDENT IN AN ORGANIZED HEALTH CARE EDUCATION/TRAINING PROGRAM

## 2022-04-20 PROCEDURE — 37000008 HC ANESTHESIA 1ST 15 MINUTES: Performed by: OPHTHALMOLOGY

## 2022-04-20 PROCEDURE — 88307 PR  SURG PATH,LEVEL V: ICD-10-PCS | Mod: 26,,, | Performed by: STUDENT IN AN ORGANIZED HEALTH CARE EDUCATION/TRAINING PROGRAM

## 2022-04-20 PROCEDURE — D9220A PRA ANESTHESIA: Mod: CRNA,,, | Performed by: NURSE ANESTHETIST, CERTIFIED REGISTERED

## 2022-04-20 PROCEDURE — 87077 CULTURE AEROBIC IDENTIFY: CPT | Performed by: OPHTHALMOLOGY

## 2022-04-20 PROCEDURE — 20600001 HC STEP DOWN PRIVATE ROOM

## 2022-04-20 PROCEDURE — 94761 N-INVAS EAR/PLS OXIMETRY MLT: CPT

## 2022-04-20 PROCEDURE — 37000009 HC ANESTHESIA EA ADD 15 MINS: Performed by: OPHTHALMOLOGY

## 2022-04-20 PROCEDURE — 88312 PR  SPECIAL STAINS,GROUP I: ICD-10-PCS | Mod: 26,,, | Performed by: STUDENT IN AN ORGANIZED HEALTH CARE EDUCATION/TRAINING PROGRAM

## 2022-04-20 PROCEDURE — 99232 PR SUBSEQUENT HOSPITAL CARE,LEVL II: ICD-10-PCS | Mod: ,,, | Performed by: HOSPITALIST

## 2022-04-20 PROCEDURE — 88307 TISSUE EXAM BY PATHOLOGIST: CPT | Mod: 26,,, | Performed by: STUDENT IN AN ORGANIZED HEALTH CARE EDUCATION/TRAINING PROGRAM

## 2022-04-20 PROCEDURE — 87205 SMEAR GRAM STAIN: CPT | Performed by: OPHTHALMOLOGY

## 2022-04-20 PROCEDURE — 87075 CULTR BACTERIA EXCEPT BLOOD: CPT | Mod: 59 | Performed by: OPHTHALMOLOGY

## 2022-04-20 PROCEDURE — D9220A PRA ANESTHESIA: ICD-10-PCS | Mod: CRNA,,, | Performed by: NURSE ANESTHETIST, CERTIFIED REGISTERED

## 2022-04-20 PROCEDURE — 36415 COLL VENOUS BLD VENIPUNCTURE: CPT | Performed by: HOSPITALIST

## 2022-04-20 PROCEDURE — 94640 AIRWAY INHALATION TREATMENT: CPT

## 2022-04-20 PROCEDURE — 36000707: Performed by: OPHTHALMOLOGY

## 2022-04-20 PROCEDURE — 87070 CULTURE OTHR SPECIMN AEROBIC: CPT | Performed by: OPHTHALMOLOGY

## 2022-04-20 PROCEDURE — 80048 BASIC METABOLIC PNL TOTAL CA: CPT | Performed by: HOSPITALIST

## 2022-04-20 RX ORDER — BUPIVACAINE HYDROCHLORIDE 5 MG/ML
INJECTION, SOLUTION EPIDURAL; INTRACAUDAL
Status: DISPENSED
Start: 2022-04-20 | End: 2022-04-21

## 2022-04-20 RX ORDER — LIDOCAINE HCL/EPINEPHRINE/PF 2%-1:200K
VIAL (ML) INJECTION
Status: DISCONTINUED | OUTPATIENT
Start: 2022-04-20 | End: 2022-04-20 | Stop reason: HOSPADM

## 2022-04-20 RX ORDER — DEXAMETHASONE SODIUM PHOSPHATE 4 MG/ML
INJECTION, SOLUTION INTRA-ARTICULAR; INTRALESIONAL; INTRAMUSCULAR; INTRAVENOUS; SOFT TISSUE
Status: COMPLETED
Start: 2022-04-20 | End: 2022-04-20

## 2022-04-20 RX ORDER — VASOPRESSIN 20 [USP'U]/ML
INJECTION, SOLUTION INTRAMUSCULAR; SUBCUTANEOUS
Status: DISCONTINUED | OUTPATIENT
Start: 2022-04-20 | End: 2022-04-21

## 2022-04-20 RX ORDER — NEOMYCIN SULFATE, POLYMYXIN B SULFATE, AND DEXAMETHASONE 3.5; 10000; 1 MG/G; [USP'U]/G; MG/G
OINTMENT OPHTHALMIC
Status: DISCONTINUED
Start: 2022-04-20 | End: 2022-04-20 | Stop reason: WASHOUT

## 2022-04-20 RX ORDER — SODIUM CHLORIDE 9 MG/ML
INJECTION, SOLUTION INTRAVENOUS ONCE
Status: DISCONTINUED | OUTPATIENT
Start: 2022-04-21 | End: 2022-04-22 | Stop reason: HOSPADM

## 2022-04-20 RX ORDER — LIDOCAINE HCL/EPINEPHRINE/PF 2%-1:200K
VIAL (ML) INJECTION
Status: DISPENSED
Start: 2022-04-20 | End: 2022-04-21

## 2022-04-20 RX ORDER — ONDANSETRON 2 MG/ML
INJECTION INTRAMUSCULAR; INTRAVENOUS
Status: DISCONTINUED | OUTPATIENT
Start: 2022-04-20 | End: 2022-04-21

## 2022-04-20 RX ORDER — BUPIVACAINE HYDROCHLORIDE 5 MG/ML
INJECTION, SOLUTION EPIDURAL; INTRACAUDAL
Status: DISCONTINUED | OUTPATIENT
Start: 2022-04-20 | End: 2022-04-20 | Stop reason: HOSPADM

## 2022-04-20 RX ORDER — PROPOFOL 10 MG/ML
VIAL (ML) INTRAVENOUS
Status: DISCONTINUED | OUTPATIENT
Start: 2022-04-20 | End: 2022-04-21

## 2022-04-20 RX ORDER — TOBRAMYCIN AND DEXAMETHASONE 3; 1 MG/ML; MG/ML
SUSPENSION/ DROPS OPHTHALMIC
Status: DISPENSED
Start: 2022-04-20 | End: 2022-04-21

## 2022-04-20 RX ORDER — TOBRAMYCIN AND DEXAMETHASONE 3; 1 MG/ML; MG/ML
SUSPENSION/ DROPS OPHTHALMIC
Status: DISCONTINUED | OUTPATIENT
Start: 2022-04-20 | End: 2022-04-20 | Stop reason: HOSPADM

## 2022-04-20 RX ORDER — TETRACAINE HYDROCHLORIDE 5 MG/ML
SOLUTION OPHTHALMIC
Status: DISCONTINUED | OUTPATIENT
Start: 2022-04-20 | End: 2022-04-20 | Stop reason: HOSPADM

## 2022-04-20 RX ORDER — EPINEPHRINE 0.1 MG/ML
INJECTION INTRAVENOUS
Status: DISCONTINUED | OUTPATIENT
Start: 2022-04-20 | End: 2022-04-21

## 2022-04-20 RX ORDER — CLINDAMYCIN PHOSPHATE 150 MG/ML
INJECTION, SOLUTION INTRAVENOUS
Status: DISCONTINUED | OUTPATIENT
Start: 2022-04-20 | End: 2022-04-20 | Stop reason: HOSPADM

## 2022-04-20 RX ORDER — ROCURONIUM BROMIDE 10 MG/ML
INJECTION, SOLUTION INTRAVENOUS
Status: DISCONTINUED | OUTPATIENT
Start: 2022-04-20 | End: 2022-04-21

## 2022-04-20 RX ORDER — FENTANYL CITRATE 50 UG/ML
INJECTION, SOLUTION INTRAMUSCULAR; INTRAVENOUS
Status: DISCONTINUED | OUTPATIENT
Start: 2022-04-20 | End: 2022-04-21

## 2022-04-20 RX ORDER — CLINDAMYCIN PHOSPHATE 150 MG/ML
INJECTION, SOLUTION INTRAVENOUS
Status: DISPENSED
Start: 2022-04-20 | End: 2022-04-21

## 2022-04-20 RX ORDER — FENTANYL CITRATE 50 UG/ML
25 INJECTION, SOLUTION INTRAMUSCULAR; INTRAVENOUS EVERY 5 MIN PRN
Status: DISCONTINUED | OUTPATIENT
Start: 2022-04-20 | End: 2022-04-20 | Stop reason: HOSPADM

## 2022-04-20 RX ORDER — DEXAMETHASONE SODIUM PHOSPHATE 4 MG/ML
INJECTION, SOLUTION INTRA-ARTICULAR; INTRALESIONAL; INTRAMUSCULAR; INTRAVENOUS; SOFT TISSUE
Status: DISCONTINUED | OUTPATIENT
Start: 2022-04-20 | End: 2022-04-21

## 2022-04-20 RX ORDER — ONDANSETRON 2 MG/ML
4 INJECTION INTRAMUSCULAR; INTRAVENOUS DAILY PRN
Status: DISCONTINUED | OUTPATIENT
Start: 2022-04-20 | End: 2022-04-20 | Stop reason: HOSPADM

## 2022-04-20 RX ORDER — LIDOCAINE HYDROCHLORIDE 20 MG/ML
INJECTION INTRAVENOUS
Status: DISCONTINUED | OUTPATIENT
Start: 2022-04-20 | End: 2022-04-21

## 2022-04-20 RX ADMIN — ROCURONIUM BROMIDE 10 MG: 10 INJECTION, SOLUTION INTRAVENOUS at 07:04

## 2022-04-20 RX ADMIN — PANTOPRAZOLE SODIUM 40 MG: 40 INJECTION, POWDER, FOR SOLUTION INTRAVENOUS at 10:04

## 2022-04-20 RX ADMIN — FLUTICASONE FUROATE AND VILANTEROL TRIFENATATE 1 PUFF: 100; 25 POWDER RESPIRATORY (INHALATION) at 08:04

## 2022-04-20 RX ADMIN — DEXAMETHASONE SODIUM PHOSPHATE 4 MG: 4 INJECTION, SOLUTION INTRAMUSCULAR; INTRAVENOUS at 08:04

## 2022-04-20 RX ADMIN — FENTANYL CITRATE 50 MCG: 50 INJECTION, SOLUTION INTRAMUSCULAR; INTRAVENOUS at 06:04

## 2022-04-20 RX ADMIN — GLYCOPYRROLATE 0.2 MG: 0.2 INJECTION INTRAMUSCULAR; INTRAVENOUS at 07:04

## 2022-04-20 RX ADMIN — ROCURONIUM BROMIDE 30 MG: 10 INJECTION, SOLUTION INTRAVENOUS at 06:04

## 2022-04-20 RX ADMIN — CEFTRIAXONE 2 G: 1 INJECTION, POWDER, FOR SOLUTION INTRAMUSCULAR; INTRAVENOUS at 06:04

## 2022-04-20 RX ADMIN — ROCURONIUM BROMIDE 10 MG: 10 INJECTION, SOLUTION INTRAVENOUS at 06:04

## 2022-04-20 RX ADMIN — ONDANSETRON 4 MG: 2 INJECTION, SOLUTION INTRAMUSCULAR; INTRAVENOUS at 09:04

## 2022-04-20 RX ADMIN — OXYCODONE HYDROCHLORIDE 10 MG: 10 TABLET ORAL at 11:04

## 2022-04-20 RX ADMIN — VASOPRESSIN 2 UNITS: 20 INJECTION, SOLUTION INTRAMUSCULAR; SUBCUTANEOUS at 06:04

## 2022-04-20 RX ADMIN — GLYCOPYRROLATE 0.2 MG: 0.2 INJECTION INTRAMUSCULAR; INTRAVENOUS at 06:04

## 2022-04-20 RX ADMIN — ISOSORBIDE MONONITRATE 20 MG: 20 TABLET ORAL at 11:04

## 2022-04-20 RX ADMIN — VASOPRESSIN 1 UNITS: 20 INJECTION, SOLUTION INTRAMUSCULAR; SUBCUTANEOUS at 06:04

## 2022-04-20 RX ADMIN — SODIUM CHLORIDE, SODIUM GLUCONATE, SODIUM ACETATE, POTASSIUM CHLORIDE, MAGNESIUM CHLORIDE, SODIUM PHOSPHATE, DIBASIC, AND POTASSIUM PHOSPHATE: .53; .5; .37; .037; .03; .012; .00082 INJECTION, SOLUTION INTRAVENOUS at 05:04

## 2022-04-20 RX ADMIN — PROPOFOL 70 MG: 10 INJECTION, EMULSION INTRAVENOUS at 06:04

## 2022-04-20 RX ADMIN — HYDRALAZINE HYDROCHLORIDE 25 MG: 25 TABLET, FILM COATED ORAL at 10:04

## 2022-04-20 RX ADMIN — VASOPRESSIN 2 UNITS: 20 INJECTION, SOLUTION INTRAMUSCULAR; SUBCUTANEOUS at 07:04

## 2022-04-20 RX ADMIN — EPINEPHRINE 15 MCG: 0.1 INJECTION, SOLUTION ENDOTRACHEAL; INTRACARDIAC; INTRAVENOUS at 07:04

## 2022-04-20 RX ADMIN — SUGAMMADEX 200 MG: 100 INJECTION, SOLUTION INTRAVENOUS at 08:04

## 2022-04-20 RX ADMIN — LIDOCAINE HYDROCHLORIDE 20 MG: 20 INJECTION, SOLUTION INTRAVENOUS at 06:04

## 2022-04-20 RX ADMIN — PROPOFOL 30 MG: 10 INJECTION, EMULSION INTRAVENOUS at 07:04

## 2022-04-20 RX ADMIN — CEFTRIAXONE 2 G: 2 INJECTION, SOLUTION INTRAVENOUS at 05:04

## 2022-04-20 RX ADMIN — FENTANYL CITRATE 50 MCG: 50 INJECTION, SOLUTION INTRAMUSCULAR; INTRAVENOUS at 08:04

## 2022-04-20 RX ADMIN — VASOPRESSIN 3 UNITS: 20 INJECTION, SOLUTION INTRAMUSCULAR; SUBCUTANEOUS at 08:04

## 2022-04-20 NOTE — ANESTHESIA PROCEDURE NOTES
Intubation    Date/Time: 4/20/2022 5:59 PM  Performed by: Lora Chiang CRNA  Authorized by: Juan Rose MD     Intubation:     Induction:  Intravenous    Intubated:  Postinduction    Mask Ventilation:  Easy mask    Attempts:  1    Attempted By:  CRNA    Method of Intubation:  Direct    Blade:  Mendiola 2    Laryngeal View Grade: Grade I - full view of cords      Difficult Airway Encountered?: No      Complications:  None    Airway Device:  Oral endotracheal tube    Airway Device Size:  7.5    Style/Cuff Inflation:  Cuffed    Inflation Amount (mL):  5    Tube secured:  21    Secured at:  The lips    Placement Verified By:  Capnometry    Complicating Factors:  None    Findings Post-Intubation:  BS equal bilateral and atraumatic/condition of teeth unchanged

## 2022-04-20 NOTE — ASSESSMENT & PLAN NOTE
-- Opthalmology consulted and has seen patient; plan for surgical management on Wednesday 4/20  -- CT orbits shows minimal asymmetric enhancement at the periphery of the left globe compared to the right could represent a mild inflammatory or infectious process  - Noted to have recent Klebsiella pneumoniae bacteremia treated with cipro, potential source?. Repeat blood cultures ordered.   - Abx per ID; currently receiving ceftriaxone

## 2022-04-20 NOTE — H&P
Pre-Operative History & Physical  Ophthalmology      SUBJECTIVE:     History of Present Illness:  Patient is a 82 y.o. female presents with endophthalmitis left eye     MEDICATIONS:   PTA Medications   Medication Sig    albuterol (PROVENTIL/VENTOLIN HFA) 90 mcg/actuation inhaler Inhale 2 puffs by mouth every 4  to 6 hours as needed    aspirin (ECOTRIN) 81 MG EC tablet Take 81 mg by mouth once daily.    blood sugar diagnostic Strp by Misc.(Non-Drug; Combo Route) route    budesonide-formoterol 160-4.5 mcg (SYMBICORT) 160-4.5 mcg/actuation HFAA Inhale 2 puffs into the lungs every 12 (twelve) hours. Controller    calcitRIOL (ROCALTROL) 0.25 MCG Cap Take 1 capsule by mouth once daily.    carvediloL (COREG) 3.125 MG tablet Take 3.125 mg by mouth 2 (two) times daily with meals.    CIPRO 250 mg tablet Take 250 mg by mouth once daily.    glimepiride (AMARYL) 4 MG tablet Take 1 tablet by mouth every morning.    hydrALAZINE (APRESOLINE) 100 MG tablet Take 1 tablet by mouth 2 (two) times daily.    hydrALAZINE (APRESOLINE) 25 MG tablet Take 25 mg by mouth 3 (three) times daily.    isosorbide mononitrate (ISMO,MONOKET) 20 MG Tab Take 1 tablet by mouth 2 (two) times daily.    levothyroxine (SYNTHROID) 150 MCG tablet Take 1 tablet by mouth 30 minutes before breakfast    montelukast (SINGULAIR) 10 mg tablet Take 1 tablet by mouth nightly.       ALLERGIES:   Review of patient's allergies indicates:   Allergen Reactions    Amlodipine Other (See Comments)    Atorvastatin Other (See Comments)    Nebivolol Other (See Comments)       PAST MEDICAL HISTORY:   Past Medical History:   Diagnosis Date    DM2 (diabetes mellitus, type 2) 4/14/2022    HTN (hypertension) 4/14/2022     PAST SURGICAL HISTORY:   Past Surgical History:   Procedure Laterality Date    CATARACT EXTRACTION Bilateral      PAST FAMILY HISTORY:   Family History   Problem Relation Age of Onset    No Known Problems Mother     No Known Problems Father      No Known Problems Sister     No Known Problems Brother     No Known Problems Maternal Aunt     No Known Problems Maternal Uncle     No Known Problems Paternal Aunt     No Known Problems Paternal Uncle     No Known Problems Maternal Grandmother     No Known Problems Maternal Grandfather     No Known Problems Paternal Grandmother     No Known Problems Paternal Grandfather     Amblyopia Neg Hx     Blindness Neg Hx     Cancer Neg Hx     Cataracts Neg Hx     Diabetes Neg Hx     Glaucoma Neg Hx     Hypertension Neg Hx     Macular degeneration Neg Hx     Retinal detachment Neg Hx     Strabismus Neg Hx     Stroke Neg Hx     Thyroid disease Neg Hx      SOCIAL HISTORY:   Social History     Tobacco Use    Smoking status: Never Smoker    Smokeless tobacco: Never Used        MENTAL STATUS: Alert    REVIEW OF SYSTEMS: Negative    OBJECTIVE:     Vital Signs (Most Recent)  Temp: 99 °F (37.2 °C) (04/20/22 0335)  Pulse: (!) 59 (04/20/22 0732)  Resp: 18 (04/20/22 0335)  BP: (!) 166/73 (04/20/22 0335)  SpO2: 98 % (04/20/22 0629)    Physical Exam:  General: NAD  HEENT: see clinic note for full details  Lungs: Adequate respirations  Heart: + pulses  Abdomen: Soft    ASSESSMENT/PLAN:     Patient is a 82 y.o. 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     - Plan for surgical correction - evisceration vs enucleation left eye   - Risks/benefits/alternatives of the procedure including, but not limited to scarring, bleeding, infection, loss or decreased vision, and/or need for possible repeat surgery discussed with the patient and family.   - Informed consent obtained prior to surgery and the patient/family voiced good understanding.    Minor Zabala MD PGY-3  Ophthalmology  Resident  04/20/2022  7:36 AM

## 2022-04-20 NOTE — PROGRESS NOTES
[facility] ([address] Medicine  Progress Note    Patient Name: Erika Perera  MRN: [id number]  Patient Class: IP- Inpatient   Admission Date: 4/14/2022  Length of Stay: 6 days  Attending Physician: Ila Garcia MD  Primary Care Provider: Minor Bennett MD        Subjective:     Principal Problem:Endophthalmitis        HPI:  81 yo F with PMHx ESRD, CAD, chronic diastolic heart failure, HTN, DM2, and HLD who presents to the ED from ophthalmology clinic for L eye pain and swelling x 1 week. Pt. Reports that she has had progressively worsening L eye pain and vision loss over the course of the last week. Pt. Reports developing floaters and slowly worsening vision loss with swelling of her upper and lower eyelids. Pt. States that she has developed worsening pain in addition to the swelling over the last couple of days which prompted her to schedule an appointment with optometry. Pt. Seen in optometry clinic and was referred to ophthalmology clinic today over concerns for endophthalmitis. She was then sent to the ED for CT orbits and admit for likely need for enucleation and evisceration due to the severity of the disease. Of note, patient was recently admitted to [facility] 4/5-/48 for Klebsiella pneumoniae bacteremia thought to be 2/2 UTI. Culture results show pansensitive klebsiella, and the patient was discharged on PO ciprofloxacin and reports compliance with the regimen. Pt. Denies any fevers, chills, eye discharge, or redness.      Overview/Hospital Course:  Patient admitted to hospital service.  Ophthalmology, ID, nephrology consulted.  Started on ceftriaxone. Blood cx collected.  CT scan demonstrated bilateral exophthalmos and inflammatory changes involving left globe.  During admission, patient noted hypoglycemic.  She has had issues at home with the same.  She takes sulfonylurea at home and is a HD patient.  Started on D10gtt, SQ octreotide, glucagon, regular renal diet,  q2hr accuchecks.  Also noted to be anemic on admission thought to be secondary to ESRD and acute infection.  Nurse noted maroon stools.  Patient received total of 3U PRBC.  GI consulted and recommended outpatient colonoscopy as well as stool softner and increase fiber diet.  Placed on IV PPI with serial Hb.  Stopped asa and ticagrelor.  Patient noted to have new right first toe ulcer.  Xray indicated osteomyelitis.  Podiatry consulted and performed bedside debridement.  Specimen sent to micro and path.  Incidental adrenal mass found on abdominal US.  Follow up with CT scan recommended at some point in the future.    Patient to OR on Wed 4/20 for surgery to left eye.       Interval History: No acute events overnight.  Afebrile.  Still having eye pain.  No nausea today.  Afebrile.      Review of Systems   Constitutional:  Positive for appetite change. Negative for fever.   Eyes:  Positive for photophobia, pain and visual disturbance.   Respiratory:  Negative for cough and shortness of breath.    Cardiovascular:  Negative for chest pain and leg swelling.   Gastrointestinal:  Negative for abdominal pain, nausea and vomiting.   Genitourinary:         Anuric    Musculoskeletal:  Negative for back pain and myalgias.   Neurological:  Negative for headaches.   Objective:     Vital Signs (Most Recent):  Temp: 98.8 °F (37.1 °C) (04/20/22 1411)  Pulse: 60 (04/20/22 1149)  Resp: (!) 24 (04/20/22 1411)  BP: (!) 173/96 (04/20/22 1411)  SpO2: 97 % (04/20/22 1627)   Vital Signs (24h Range):  Temp:  [98.3 °F (36.8 °C)-99.2 °F (37.3 °C)] 98.8 °F (37.1 °C)  Pulse:  [57-73] 60  Resp:  [15-27] 24  SpO2:  [93 %-98 %] 97 %  BP: (138-173)/(60-96) 173/96     Weight: 81.6 kg (180 lb)  Body mass index is 29.95 kg/m².  No intake or output data in the 24 hours ending 04/20/22 4123   Physical Exam  Constitutional:       Appearance: Normal appearance.   HENT:      Head: Normocephalic and atraumatic.   Eyes:      General:         Left eye:  Discharge present.     Conjunctiva/sclera:      Left eye: Exudate present.   Cardiovascular:      Rate and Rhythm: Normal rate and regular rhythm.   Pulmonary:      Effort: Pulmonary effort is normal.      Breath sounds: Normal breath sounds.   Abdominal:      General: Bowel sounds are normal.      Palpations: Abdomen is soft.   Musculoskeletal:         General: No deformity.      Right lower leg: No edema.      Left lower leg: No edema.   Skin:     General: Skin is warm and dry.   Neurological:      General: No focal deficit present.      Mental Status: She is alert and oriented to person, place, and time.   Psychiatric:         Mood and Affect: Mood normal.         Behavior: Behavior normal.       Significant Labs: All pertinent labs within the past 24 hours have been reviewed.    Significant Imaging: I have reviewed all pertinent imaging results/findings within the past 24 hours.      Assessment/Plan:      * Endophthalmitis  -- Opthalmology consulted and has seen patient; plan for surgical management on Wednesday 4/20  -- CT orbits shows minimal asymmetric enhancement at the periphery of the left globe compared to the right could represent a mild inflammatory or infectious process  - Noted to have recent Klebsiella pneumoniae bacteremia treated with cipro, potential source?. Repeat blood cultures ordered.   - Abx per ID; currently receiving ceftriaxone     Hypoglycemia  - secondary to sulfonylurea and active infection   - SQ octreotide, D10, Glucagon, regular diet    - transferring to higher level of care for q2hr accuchecks   - 4/17- finally improving   - 4/18- no longer on D10 gtt; transfer to regular med/surg floor   -4/19- resolved     Gastrointestinal bleeding  - IV PPI bid-->change to PO PPI  - serial Hb/Hct-->stable  - transfuse total 3U PRBC  - stop ASA and prophylactic heparin   - GI consulted-- recommended stool softner, high fiber, outpatient colon  - NOTE patient was recently started on ticagrelor at  another hospital after a type II MI secondary to sepsis. This is being held as well.     Subacute osteomyelitis of right foot  - patient went to [facility] for pedicure two weeks ago and developed ulcer right first toe  - XRAY demonstrates evidence of osteomyelitis   - Continue abx coverage  - consult podiatry -- bedside debridement  - follow up cx and path--> negative so far      Adrenal mass, right  - Incidentally found on abdominal US.  - will need further imaging at some point in the future.     ESRD (end stage renal disease)  - No acue issues or need for urgent HD, nephrology consulted for regular HD while admitted      DM2 (diabetes mellitus, type 2)  -A1c ordered and pending  - Hold diabetes medication secondary to hypoglycemia  - discontinue glimepiride indefinitely   - blood glucose levels well controlled without medication at this time       Secondary hypothyroidism  - restart home dose levothyroxine  - TSH WNL    Bacteremia due to Klebsiella pneumoniae  -Recently diagnosed based on blood cultures done at [facility] 4/5, pansensitive  -Suspect bacteremia may be cause of Endogenous bacterial endophthalmitis, repeat blood cultures ordered  - Consult ID        HTN (hypertension)  -Continue home coreg, hydralazine, and isosorbide    CAD (coronary artery disease)  -No complaints of chest pain, baseline EKG ordered. TTE ordered to rule out vegetetation in setting of bacteremia with concern for endogenous bacterial endophthalmitis--> so far blood cx negative   -Continue home statin  - holding asa and ticagrelor secondary to GI bleed         VTE Risk Mitigation (From admission, onward)         Ordered     heparin (porcine) injection 1,000 Units  As needed (PRN)         04/15/22 0757     IP VTE HIGH RISK PATIENT  Once         04/14/22 1942     Place sequential compression device  Until discontinued         04/14/22 1942                Discharge Planning   ROM: 4/21/2022     Code Status: Full Code   Is the patient  medically ready for discharge?: No    Reason for patient still in hospital (select all that apply): Treatment  Discharge Plan A: Home with family, Home Health   Discharge Delays: None known at this time              Ila Garcia MD  Department of Hospital Medicine   [facility] - Intensive Care ([place]-[facility])

## 2022-04-20 NOTE — ASSESSMENT & PLAN NOTE
- secondary to sulfonylurea and active infection   - SQ octreotide, D10, Glucagon, regular diet    - transferring to higher level of care for q2hr accuchecks   - 4/17- finally improving   - 4/18- no longer on D10 gtt; transfer to regular med/surg floor   -4/19- resolved

## 2022-04-20 NOTE — ASSESSMENT & PLAN NOTE
- patient went to [facility] for pedicure two weeks ago and developed ulcer right first toe  - XRAY demonstrates evidence of osteomyelitis   - Continue abx coverage  - consult podiatry -- bedside debridement  - follow up cx and path--> negative so far     negative

## 2022-04-20 NOTE — ASSESSMENT & PLAN NOTE
- patient went to [facility] for pedicure two weeks ago and developed ulcer right first toe  - XRAY demonstrates evidence of osteomyelitis   - Continue abx coverage  - consult podiatry -- bedside debridement  - follow up cx and path--> negative so far

## 2022-04-20 NOTE — SUBJECTIVE & OBJECTIVE
Interval History: The patients left eye is still causing her pain and nausea.  She is afebrile.  Blood glucose levels stable.  Hb stable.  No sign of GI bleed.  No acute events overnights.  HD today.      Review of Systems   Constitutional:  Positive for activity change, appetite change and fatigue. Negative for fever.   Eyes:  Positive for photophobia, pain, discharge and visual disturbance.   Respiratory:  Negative for cough and shortness of breath.    Cardiovascular:  Positive for leg swelling. Negative for chest pain.   Gastrointestinal:  Positive for nausea. Negative for abdominal pain and vomiting.   Genitourinary:         - anuric    Musculoskeletal:  Negative for back pain.   Neurological:  Positive for headaches.   Objective:     Vital Signs (Most Recent):  Temp: 98 °F (36.7 °C) (04/19/22 1529)  Pulse: 68 (04/19/22 1835)  Resp: 11 (04/19/22 1605)  BP: (!) 149/74 (04/19/22 1529)  SpO2: 95 % (04/19/22 1829)   Vital Signs (24h Range):  Temp:  [97.7 °F (36.5 °C)-98.8 °F (37.1 °C)] 98 °F (36.7 °C)  Pulse:  [54-71] 68  Resp:  [11-32] 11  SpO2:  [64 %-96 %] 95 %  BP: (112-171)/(50-76) 149/74     Weight: 81.6 kg (180 lb)  Body mass index is 29.95 kg/m².    Intake/Output Summary (Last 24 hours) at 4/19/2022 1900  Last data filed at 4/19/2022 1800  Gross per 24 hour   Intake 920 ml   Output 2802 ml   Net -1882 ml      Physical Exam  Constitutional:       General: She is not in acute distress.     Appearance: Normal appearance.   HENT:      Head: Normocephalic and atraumatic.      Nose: Nose normal.      Mouth/Throat:      Mouth: Mucous membranes are moist.      Pharynx: Oropharynx is clear.   Eyes:      General:         Left eye: Discharge present.     Conjunctiva/sclera:      Left eye: Exudate present.   Cardiovascular:      Rate and Rhythm: Normal rate and regular rhythm.      Heart sounds: No murmur heard.  Pulmonary:      Effort: Pulmonary effort is normal. No respiratory distress.      Breath sounds: Normal  breath sounds.   Abdominal:      General: Bowel sounds are normal. There is no distension.      Palpations: Abdomen is soft.   Musculoskeletal:         General: No deformity. Normal range of motion.      Right lower leg: Edema present.      Left lower leg: Edema present.   Skin:     General: Skin is warm and dry.      Capillary Refill: Capillary refill takes less than 2 seconds.   Neurological:      General: No focal deficit present.      Mental Status: She is alert and oriented to person, place, and time.   Psychiatric:         Mood and Affect: Mood normal.         Behavior: Behavior normal.       Significant Labs: All pertinent labs within the past 24 hours have been reviewed.    Significant Imaging: I have reviewed all pertinent imaging results/findings within the past 24 hours.

## 2022-04-20 NOTE — NURSING
Patient returned to floor. Per pre-op nurse, it is not sure when patient will actually have surgery.  Therefore, patient sent back to floor to wait.

## 2022-04-20 NOTE — SUBJECTIVE & OBJECTIVE
Interval History: No acute events overnight.  Afebrile.  Still having eye pain.  No nausea today.  Afebrile.      Review of Systems   Constitutional:  Positive for appetite change. Negative for fever.   Eyes:  Positive for photophobia, pain and visual disturbance.   Respiratory:  Negative for cough and shortness of breath.    Cardiovascular:  Negative for chest pain and leg swelling.   Gastrointestinal:  Negative for abdominal pain, nausea and vomiting.   Genitourinary:         Anuric    Musculoskeletal:  Negative for back pain and myalgias.   Neurological:  Negative for headaches.   Objective:     Vital Signs (Most Recent):  Temp: 98.8 °F (37.1 °C) (04/20/22 1411)  Pulse: 60 (04/20/22 1149)  Resp: (!) 24 (04/20/22 1411)  BP: (!) 173/96 (04/20/22 1411)  SpO2: 97 % (04/20/22 1627)   Vital Signs (24h Range):  Temp:  [98.3 °F (36.8 °C)-99.2 °F (37.3 °C)] 98.8 °F (37.1 °C)  Pulse:  [57-73] 60  Resp:  [15-27] 24  SpO2:  [93 %-98 %] 97 %  BP: (138-173)/(60-96) 173/96     Weight: 81.6 kg (180 lb)  Body mass index is 29.95 kg/m².  No intake or output data in the 24 hours ending 04/20/22 5273   Physical Exam  Constitutional:       Appearance: Normal appearance.   HENT:      Head: Normocephalic and atraumatic.   Eyes:      General:         Left eye: Discharge present.     Conjunctiva/sclera:      Left eye: Exudate present.   Cardiovascular:      Rate and Rhythm: Normal rate and regular rhythm.   Pulmonary:      Effort: Pulmonary effort is normal.      Breath sounds: Normal breath sounds.   Abdominal:      General: Bowel sounds are normal.      Palpations: Abdomen is soft.   Musculoskeletal:         General: No deformity.      Right lower leg: No edema.      Left lower leg: No edema.   Skin:     General: Skin is warm and dry.   Neurological:      General: No focal deficit present.      Mental Status: She is alert and oriented to person, place, and time.   Psychiatric:         Mood and Affect: Mood normal.         Behavior:  Behavior normal.       Significant Labs: All pertinent labs within the past 24 hours have been reviewed.    Significant Imaging: I have reviewed all pertinent imaging results/findings within the past 24 hours.

## 2022-04-20 NOTE — SUBJECTIVE & OBJECTIVE
Interval History: Afebrile. Denies HA. Endorses left eye blindness.       Review of Systems   Constitutional:  Positive for activity change. Negative for appetite change, chills and fever.   HENT:  Negative for congestion.    Eyes:  Positive for pain and visual disturbance (left eye). Negative for itching.   Respiratory:  Negative for cough, chest tightness and shortness of breath.    Cardiovascular:  Negative for palpitations and leg swelling.   Gastrointestinal:  Positive for nausea. Negative for abdominal pain and diarrhea.   Endocrine: Negative for polyphagia and polyuria.   Genitourinary:  Negative for dysuria and frequency.   Musculoskeletal:  Negative for back pain.   Neurological:  Negative for numbness and headaches.   Psychiatric/Behavioral:  Negative for agitation and behavioral problems.    Objective:     Vital Signs (Most Recent):  Temp: 98.8 °F (37.1 °C) (04/20/22 1411)  Pulse: 60 (04/20/22 1149)  Resp: (!) 24 (04/20/22 1411)  BP: (!) 173/96 (04/20/22 1411)  SpO2: 96 % (04/20/22 1411)   Vital Signs (24h Range):  Temp:  [98.3 °F (36.8 °C)-99.2 °F (37.3 °C)] 98.8 °F (37.1 °C)  Pulse:  [57-73] 60  Resp:  [11-27] 24  SpO2:  [93 %-98 %] 96 %  BP: (138-173)/(60-96) 173/96     Weight: 81.6 kg (180 lb)  Body mass index is 29.95 kg/m².    Estimated Creatinine Clearance: 9.7 mL/min (A) (based on SCr of 4.7 mg/dL (H)).    Physical Exam  Constitutional:       Appearance: She is well-developed.   HENT:      Head: Normocephalic.   Eyes:      General:         Left eye: Discharge present.     Conjunctiva/sclera:      Left eye: Chemosis present.   Cardiovascular:      Rate and Rhythm: Normal rate and regular rhythm.      Heart sounds: Normal heart sounds. No murmur heard.  Pulmonary:      Effort: Pulmonary effort is normal.      Breath sounds: Normal breath sounds.   Abdominal:      General: Bowel sounds are normal. There is no distension.      Palpations: Abdomen is soft.      Tenderness: There is no abdominal  tenderness.   Musculoskeletal:         General: Normal range of motion.   Neurological:      Mental Status: She is alert and oriented to person, place, and time.   Psychiatric:         Behavior: Behavior normal.       Significant Labs:   Microbiology Results (last 7 days)       Procedure Component Value Units Date/Time    Culture, Anaerobe [[id number]] Collected: 04/16/22 1112    Order Status: Completed Specimen: Bone from Toe, Right Foot Updated: 04/20/22 0932     Anaerobic Culture Culture in progress    Blood culture #1 **CANNOT BE ORDERED STAT** [[id number]] Collected: 04/14/22 1728    Order Status: Completed Specimen: Blood from Peripheral, Forearm, Right Updated: 04/19/22 2012     Blood Culture, Routine No growth after 5 days.    Blood culture #2 **CANNOT BE ORDERED STAT** [[id number]] Collected: 04/14/22 1729    Order Status: Completed Specimen: Blood from Peripheral, Forearm, Right Updated: 04/19/22 2012     Blood Culture, Routine No growth after 5 days.    Aerobic culture [[id number]] Collected: 04/16/22 1112    Order Status: Completed Specimen: Bone from Toe, Right Foot Updated: 04/19/22 0848     Aerobic Bacterial Culture No growth            Significant Imaging: I have reviewed all pertinent imaging results/findings within the past 24 hours.

## 2022-04-20 NOTE — ASSESSMENT & PLAN NOTE
81 y/o female with a hx of ESRD on HD MWF vis AV LUE, CAD, HF, HTN, DM2, and recent admission to OSH 4/5-4/8 for Klebsiella pneumoniae bacteremia thought to be 2/2 UTI. Culture results show pansensitive klebsiella, and the patient was discharged on PO ciprofloxacin. Now with eye swelling and vision changes for 1-2 weeks.  Seen by ophthalmology with concern for endophthalmitis - CT orbit with minimal asymmetric enhancement at the periphery of the left globe compared to the right could represent a mild inflammatory or infectious process. Due to lack of trauma and wounds to the eye, expect hematogenous.  Source of her bacteremia could be urinary vs osteomyelitis of toe.  Klebsiella pneumoniae is also known to cause liver abscesses as well.  U/S abdomen with ?right adrenal mass    Recommendations:  -Continue ceftriaxone 2 g IV q 12.  -Follow up Bcx.  -Appreciate ophthalmology input - Plan for OR on 4/20 (evisceration vs enucleation).

## 2022-04-20 NOTE — PLAN OF CARE
Glucose at goal mostly in last 12 hours, previously with some mild elevations within reasonable range for her age and comorbidities.   If glucose is persistently elevated, would recommend Tradjenta 5 mg on discharge.

## 2022-04-20 NOTE — PROGRESS NOTES
[facility] - [facility] ([address] Medicine  Progress Note    Patient Name: Erika Perera  MRN: [id number]  Patient Class: IP- Inpatient   Admission Date: 4/14/2022  Length of Stay: 5 days  Attending Physician: Ila Garcia MD  Primary Care Provider: Minor Bennett MD        Subjective:     Principal Problem:Endophthalmitis        HPI:  81 yo F with PMHx ESRD, CAD, chronic diastolic heart failure, HTN, DM2, and HLD who presents to the ED from ophthalmology clinic for L eye pain and swelling x 1 week. Pt. Reports that she has had progressively worsening L eye pain and vision loss over the course of the last week. Pt. Reports developing floaters and slowly worsening vision loss with swelling of her upper and lower eyelids. Pt. States that she has developed worsening pain in addition to the swelling over the last couple of days which prompted her to schedule an appointment with optometry. Pt. Seen in optometry clinic and was referred to ophthalmology clinic today over concerns for endophthalmitis. She was then sent to the ED for CT orbits and admit for likely need for enucleation and evisceration due to the severity of the disease. Of note, patient was recently admitted to [facility] 4/5-/48 for Klebsiella pneumoniae bacteremia thought to be 2/2 UTI. Culture results show pansensitive klebsiella, and the patient was discharged on PO ciprofloxacin and reports compliance with the regimen. Pt. Denies any fevers, chills, eye discharge, or redness.      Overview/Hospital Course:  Patient admitted to hospital service.  Ophthalmology, ID, nephrology consulted.  Started on ceftriaxone. Blood cx collected.  CT scan demonstrated bilateral exophthalmos and inflammatory changes involving left globe.  During admission, patient noted hypoglycemic.  She has had issues at home with the same.  She takes sulfonylurea at home and is a HD patient.  Started on D10gtt, SQ octreotide, glucagon, regular renal diet,  q2hr accuchecks.  Also noted to be anemic on admission thought to be secondary to ESRD and acute infection.  Nurse noted maroon stools.  Patient received total of 3U PRBC.  GI consulted and recommended outpatient colonoscopy as well as stool softner and increase fiber diet.  Placed on IV PPI with serial Hb.  Stopped asa and ticagrelor.  Patient noted to have new right first toe ulcer.  Xray indicated osteomyelitis.  Podiatry consulted and performed bedside debridement.  Specimen sent to micro and path.  Incidental adrenal mass found on abdominal US.  Follow up with CT scan recommended at some point in the future.        Interval History: The patients left eye is still causing her pain and nausea.  She is afebrile.  Blood glucose levels stable.  Hb stable.  No sign of GI bleed.  No acute events overnights.  HD today.      Review of Systems   Constitutional:  Positive for activity change, appetite change and fatigue. Negative for fever.   Eyes:  Positive for photophobia, pain, discharge and visual disturbance.   Respiratory:  Negative for cough and shortness of breath.    Cardiovascular:  Positive for leg swelling. Negative for chest pain.   Gastrointestinal:  Positive for nausea. Negative for abdominal pain and vomiting.   Genitourinary:         - anuric    Musculoskeletal:  Negative for back pain.   Neurological:  Positive for headaches.   Objective:     Vital Signs (Most Recent):  Temp: 98 °F (36.7 °C) (04/19/22 1529)  Pulse: 68 (04/19/22 1835)  Resp: 11 (04/19/22 1605)  BP: (!) 149/74 (04/19/22 1529)  SpO2: 95 % (04/19/22 1829)   Vital Signs (24h Range):  Temp:  [97.7 °F (36.5 °C)-98.8 °F (37.1 °C)] 98 °F (36.7 °C)  Pulse:  [54-71] 68  Resp:  [11-32] 11  SpO2:  [64 %-96 %] 95 %  BP: (112-171)/(50-76) 149/74     Weight: 81.6 kg (180 lb)  Body mass index is 29.95 kg/m².    Intake/Output Summary (Last 24 hours) at 4/19/2022 1900  Last data filed at 4/19/2022 1800  Gross per 24 hour   Intake 920 ml   Output 2802 ml    Net -1882 ml      Physical Exam  Constitutional:       General: She is not in acute distress.     Appearance: Normal appearance.   HENT:      Head: Normocephalic and atraumatic.      Nose: Nose normal.      Mouth/Throat:      Mouth: Mucous membranes are moist.      Pharynx: Oropharynx is clear.   Eyes:      General:         Left eye: Discharge present.     Conjunctiva/sclera:      Left eye: Exudate present.   Cardiovascular:      Rate and Rhythm: Normal rate and regular rhythm.      Heart sounds: No murmur heard.  Pulmonary:      Effort: Pulmonary effort is normal. No respiratory distress.      Breath sounds: Normal breath sounds.   Abdominal:      General: Bowel sounds are normal. There is no distension.      Palpations: Abdomen is soft.   Musculoskeletal:         General: No deformity. Normal range of motion.      Right lower leg: Edema present.      Left lower leg: Edema present.   Skin:     General: Skin is warm and dry.      Capillary Refill: Capillary refill takes less than 2 seconds.   Neurological:      General: No focal deficit present.      Mental Status: She is alert and oriented to person, place, and time.   Psychiatric:         Mood and Affect: Mood normal.         Behavior: Behavior normal.       Significant Labs: All pertinent labs within the past 24 hours have been reviewed.    Significant Imaging: I have reviewed all pertinent imaging results/findings within the past 24 hours.      Assessment/Plan:      * Endophthalmitis  -- Opthalmology consulted and has seen patient; plan for surgical management on Wednesday  -- CT orbits shows minimal asymmetric enhancement at the periphery of the left globe compared to the right could represent a mild inflammatory or infectious process  - Noted to have recent Klebsiella pneumoniae bacteremia treated with cipro, potential source?. Repeat blood cultures ordered.   - Abx per ID; currently receiving ceftriaxone     Hypoglycemia  - secondary to sulfonylurea and  active infection   - SQ octreotide, D10, Glucagon, regular diet    - transferring to higher level of care for q2hr accuchecks   - 4/17- finally improving   - 4/18- no longer on D10 gtt; transfer to regular med/surg floor   -4/19- resolved     Gastrointestinal bleeding  - IV PPI bid-->change to PO PPI  - serial Hb/Hct-->stable  - transfuse total 3U PRBC  - stop ASA and prophylactic heparin   - GI consulted-- recommended stool softner, high fiber, outpatient colon  - NOTE patient was recently started on ticagrelor at another hospital after a type II MI secondary to sepsis. This is being held as well.     Subacute osteomyelitis of right foot  - patient went to [facility] for pedicure two weeks ago and developed ulcer right first toe  - XRAY demonstrates evidence of osteomyelitis   - Continue abx coverage  - consult podiatry -- bedside debridement  - follow up cx and path--> negative so far      Adrenal mass, right  - Incidentally found on abdominal US.  - will need further imaging at some point in the future.     ESRD (end stage renal disease)  - No acue issues or need for urgent HD, nephrology consulted for regular HD while admitted      DM2 (diabetes mellitus, type 2)  -A1c ordered and pending  - Hold diabetes medication secondary to hypoglycemia  - discontinue glimepiride indefinitely   - blood glucose levels well controlled without medication at this time       Secondary hypothyroidism  - restart home dose levothyroxine  - TSH WNL    Bacteremia due to Klebsiella pneumoniae  -Recently diagnosed based on blood cultures done at [facility] 4/5, pansensitive  -Suspect bacteremia may be cause of Endogenous bacterial endophthalmitis, repeat blood cultures ordered  - Consult ID        HTN (hypertension)  -Continue home coreg, hydralazine, and isosorbide    CAD (coronary artery disease)  -No complaints of chest pain, baseline EKG ordered. TTE ordered to rule out vegetetation in setting of bacteremia with concern for endogenous  bacterial endophthalmitis--> so far blood cx negative   -Continue home statin  - holding asa and ticagrelor secondary to GI bleed         VTE Risk Mitigation (From admission, onward)         Ordered     heparin (porcine) injection 1,000 Units  As needed (PRN)         04/15/22 0757     IP VTE HIGH RISK PATIENT  Once         04/14/22 1942     Place sequential compression device  Until discontinued         04/14/22 1942                Discharge Planning   ROM: 4/21/2022     Code Status: Full Code   Is the patient medically ready for discharge?: No    Reason for patient still in hospital (select all that apply): Treatment  Discharge Plan A: Home with family, Home Health   Discharge Delays: None known at this time              Ila Garcia MD  Department of Hospital Medicine   [facility] - Intensive Care ([place] [address])

## 2022-04-20 NOTE — PLAN OF CARE
BSG monitoring every 4 hours.  Turn q 2 hrs.   Problem: Diabetes Comorbidity  Goal: Blood Glucose Level Within Targeted Range  Outcome: Ongoing, Progressing     Problem: Skin Injury Risk Increased  Goal: Skin Health and Integrity  Outcome: Ongoing, Progressing

## 2022-04-20 NOTE — PLAN OF CARE
AOx4. NPO since 0000. Blood glucose monitoring. Dentures and jewelry taken home by son. Plan of care reviewed with patient.     Problem: Adult Inpatient Plan of Care  Goal: Plan of Care Review  Outcome: Ongoing, Progressing  Goal: Optimal Comfort and Wellbeing  Outcome: Ongoing, Progressing     Problem: Diabetes Comorbidity  Goal: Blood Glucose Level Within Targeted Range  Outcome: Ongoing, Progressing

## 2022-04-20 NOTE — SUBJECTIVE & OBJECTIVE
Subjective:     Interval History:   No acute events overnight. Remains afebrile, no leukocytosis. Bone cultures with no growth to date thus far. Patient denies any foot pain. Right great toe healing well. Family at bedside.     Scheduled Meds:   [START ON 4/21/2022] sodium chloride 0.9%   Intravenous Once    BUPivacaine (PF) 0.5% (5 mg/mL)        carvediloL  3.125 mg Oral BID WM    cefTRIAXone (ROCEPHIN) IVPB  2 g Intravenous Q12H    clindamycin        dehydrated (ethyl alcohol)  5 mL INTRANEURAL Once    dexamethasone        epoetin maximilian-epbx  50 Units/kg Intravenous Every Mon, Wed, Fri    fluticasone furoate-vilanteroL  1 puff Inhalation Daily    gelatin adsorbable 12-7 mm top sponge  1 each Topical (Top) Once in dialysis    hydrALAZINE  25 mg Oral TID    isosorbide mononitrate  20 mg Oral BID    levothyroxine  137 mcg Oral Daily    LIDOcaine-EPINEPHrine (PF) 2%-1:200,000        neomycin-polymyxin-dexamethasone        pantoprozole (PROTONIX) IV  40 mg Intravenous Q12H    polyethylene glycol  17 g Oral Daily    tobramycin-dexamethasone 0.3-0.1%         Continuous Infusions:  PRN Meds:sodium chloride, sodium chloride 0.9%, acetaminophen, albuterol-ipratropium, BUPivacaine (PF) 0.5% (5 mg/mL), cadexomer iodine, clindamycin, dextrose 10%, dextrose 10%, heparin (porcine), insulin aspart U-100, LIDOcaine-EPINEPHrine (PF) 2%-1:200,000, melatonin, naloxone, ondansetron, oxyCODONE, oxyCODONE, prochlorperazine, sodium chloride 0.9%, sodium chloride 0.9%, TETRAcaine HCl (PF)    Review of Systems   Constitutional:  Positive for activity change. Negative for appetite change, chills and fever.   HENT:  Negative for congestion.    Eyes:  Positive for pain and visual disturbance (left eye). Negative for itching.   Respiratory:  Negative for cough, chest tightness and shortness of breath.    Cardiovascular:  Negative for palpitations and leg swelling.   Gastrointestinal:  Positive for nausea. Negative for abdominal pain and  diarrhea.   Endocrine: Negative for polyphagia and polyuria.   Genitourinary:  Negative for dysuria and frequency.   Musculoskeletal:  Negative for back pain.   Neurological:  Negative for numbness and headaches.   Psychiatric/Behavioral:  Negative for agitation and behavioral problems.    Objective:     Vital Signs (Most Recent):  Temp: 98.8 °F (37.1 °C) (04/20/22 1411)  Pulse: 60 (04/20/22 1149)  Resp: (!) 24 (04/20/22 1411)  BP: (!) 173/96 (04/20/22 1411)  SpO2: 97 % (04/20/22 1627)   Vital Signs (24h Range):  Temp:  [98.3 °F (36.8 °C)-99.2 °F (37.3 °C)] 98.8 °F (37.1 °C)  Pulse:  [57-73] 60  Resp:  [15-27] 24  SpO2:  [93 %-98 %] 97 %  BP: (138-173)/(60-96) 173/96     Weight: 81.6 kg (180 lb)  Body mass index is 29.95 kg/m².    Foot Exam    General  Orientation: alert and oriented to person, place, and time       Right Foot/Ankle     Inspection and Palpation  Tenderness: none   Swelling: none   Skin Exam: skin intact; no drainage, no cellulitis, no abnormal color, no ulcer and no erythema     Neurovascular  Dorsalis pedis: 2+  Posterior tibial: 1+      Left Foot/Ankle      Inspection and Palpation  Tenderness: none   Swelling: none   Skin Exam: skin intact; no drainage, no cellulitis, no abnormal color, no ulcer and no erythema     Neurovascular  Dorsalis pedis: 2+  Posterior tibial: 1+        Laboratory:  CBC:   Recent Labs   Lab 04/20/22  0349   WBC 9.26   RBC 3.36*   HGB 9.9*   HCT 31.2*      MCV 93   MCH 29.5   MCHC 31.7*     CRP: No results for input(s): CRP in the last 168 hours.  ESR: No results for input(s): SEDRATE in the last 168 hours.  Wound Cultures:   Recent Labs   Lab 04/16/22  1112   LABAERO No growth     Microbiology Results (last 7 days)       Procedure Component Value Units Date/Time    Culture, Anaerobe [[id number]] Collected: 04/20/22 1847    Order Status: Sent Specimen: Wound from Eyelid, Left Updated: 04/20/22 1847    Aerobic culture [[id number]] Collected: 04/20/22 1847    Order  Status: Sent Specimen: Wound from Eyelid, Left Updated: 04/20/22 1847    Gram stain [[id number]] Collected: 04/20/22 1847    Order Status: Sent Specimen: Wound from Eyelid, Left Updated: 04/20/22 1847    Culture, Anaerobe [[id number]] Collected: 04/16/22 1112    Order Status: Completed Specimen: Bone from Toe, Right Foot Updated: 04/20/22 0932     Anaerobic Culture Culture in progress    Blood culture #1 **CANNOT BE ORDERED STAT** [[id number]] Collected: 04/14/22 1728    Order Status: Completed Specimen: Blood from Peripheral, Forearm, Right Updated: 04/19/22 2012     Blood Culture, Routine No growth after 5 days.    Blood culture #2 **CANNOT BE ORDERED STAT** [[id number]] Collected: 04/14/22 1729    Order Status: Completed Specimen: Blood from Peripheral, Forearm, Right Updated: 04/19/22 2012     Blood Culture, Routine No growth after 5 days.    Aerobic culture [[id number]] Collected: 04/16/22 1112    Order Status: Completed Specimen: Bone from Toe, Right Foot Updated: 04/19/22 0848     Aerobic Bacterial Culture No growth          Specimen (24h ago, onward)                None            Diagnostic Results:  I have reviewed all pertinent imaging results/findings within the past 24 hours.      Clinical Findings:  Right distal hallux wound with new epithelial tissue. No erythema, no drainage, no crepitus or fluctuance. No probe to bone.     4/20/2022 4/16/2022

## 2022-04-20 NOTE — ASSESSMENT & PLAN NOTE
-A1c ordered and pending  - Hold diabetes medication secondary to hypoglycemia  - discontinue glimepiride indefinitely   - blood glucose levels well controlled without medication at this time

## 2022-04-20 NOTE — PROGRESS NOTES
[facility] - Surgery (1st Fl)  Infectious Disease  Progress Note    Patient Name: Erika Perera  MRN: [id number]  Admission Date: 4/14/2022  Length of Stay: 6 days  Attending Physician: Ila Garcia MD  Primary Care Provider: Minor Bennett MD    Isolation Status: No active isolations  Assessment/Plan:      * Endophthalmitis  81 y/o female with a hx of ESRD on HD MWF vis AV LUE, CAD, HF, HTN, DM2, and recent admission to OSH 4/5-4/8 for Klebsiella pneumoniae bacteremia thought to be 2/2 UTI. Culture results show pansensitive klebsiella, and the patient was discharged on PO ciprofloxacin. Now with eye swelling and vision changes for 1-2 weeks.  Seen by ophthalmology with concern for endophthalmitis - CT orbit with minimal asymmetric enhancement at the periphery of the left globe compared to the right could represent a mild inflammatory or infectious process. Due to lack of trauma and wounds to the eye, expect hematogenous.  Source of her bacteremia could be urinary vs osteomyelitis of toe.  Klebsiella pneumoniae is also known to cause liver abscesses as well.  U/S abdomen with ?right adrenal mass    Recommendations:  -Continue ceftriaxone 2 g IV q 12.  -Follow up Bcx.  -Appreciate ophthalmology input - Plan for OR on 4/20 (evisceration vs enucleation).       Subacute osteomyelitis of right foot  Patient with x-ray evidence of right foot osteomyelitis s/p bedside I&D by podiatry- bone cultures sent- no path.     Bone cx 4/16 NGT      Recommendation:  -Continue ceftriaxone 2 g IV q 12.   -Appreciate podiatry input  -Will follow up on cultures from right toe.                Anticipated Disposition: defer to primary team     Thank you for your consult. I will follow-up with patient. Please contact us if you have any additional questions.    Poncho Benavidez MD  Infectious Disease  [facility] - Surgery (1st Fl)    Subjective:     Principal Problem:Endophthalmitis    HPI: Erika Perera is an 81 y/o female with a  hx of ESRD on HD MWF vis AV LUE, CAD, HF, HTN, DM2, and HLD who presents to the ED from ophthalmology clinic for L eye pain and swelling x 1 week.     Patient was recently admitted to OSH 4/5-4/8 for Klebsiella pneumoniae bacteremia thought to be 2/2 UTI. Culture results show pansensitive klebsiella, and the patient was discharged on PO ciprofloxacin and reports compliance with the regimen. She was Seen in optometry clinic and was referred to ophthalmology clinic 04/14 over concerns for endophthalmitis. She was then sent to the ED for CT orbits and admit for likely need for enucleation and evisceration due to the severity of the disease.    She has been having worsening vision and eye swelling for 1 week, denies any fever chills, N/V abdominal pain or diarrhea. She denies any eye truama.      Interval History: Afebrile. Denies HA. Endorses left eye blindness.       Review of Systems   Constitutional:  Positive for activity change. Negative for appetite change, chills and fever.   HENT:  Negative for congestion.    Eyes:  Positive for pain and visual disturbance (left eye). Negative for itching.   Respiratory:  Negative for cough, chest tightness and shortness of breath.    Cardiovascular:  Negative for palpitations and leg swelling.   Gastrointestinal:  Positive for nausea. Negative for abdominal pain and diarrhea.   Endocrine: Negative for polyphagia and polyuria.   Genitourinary:  Negative for dysuria and frequency.   Musculoskeletal:  Negative for back pain.   Neurological:  Negative for numbness and headaches.   Psychiatric/Behavioral:  Negative for agitation and behavioral problems.    Objective:     Vital Signs (Most Recent):  Temp: 98.8 °F (37.1 °C) (04/20/22 1411)  Pulse: 60 (04/20/22 1149)  Resp: (!) 24 (04/20/22 1411)  BP: (!) 173/96 (04/20/22 1411)  SpO2: 96 % (04/20/22 1411)   Vital Signs (24h Range):  Temp:  [98.3 °F (36.8 °C)-99.2 °F (37.3 °C)] 98.8 °F (37.1 °C)  Pulse:  [57-73] 60  Resp:  [11-27]  24  SpO2:  [93 %-98 %] 96 %  BP: (138-173)/(60-96) 173/96     Weight: 81.6 kg (180 lb)  Body mass index is 29.95 kg/m².    Estimated Creatinine Clearance: 9.7 mL/min (A) (based on SCr of 4.7 mg/dL (H)).    Physical Exam  Constitutional:       Appearance: She is well-developed.   HENT:      Head: Normocephalic.   Eyes:      General:         Left eye: Discharge present.     Conjunctiva/sclera:      Left eye: Chemosis present.   Cardiovascular:      Rate and Rhythm: Normal rate and regular rhythm.      Heart sounds: Normal heart sounds. No murmur heard.  Pulmonary:      Effort: Pulmonary effort is normal.      Breath sounds: Normal breath sounds.   Abdominal:      General: Bowel sounds are normal. There is no distension.      Palpations: Abdomen is soft.      Tenderness: There is no abdominal tenderness.   Musculoskeletal:         General: Normal range of motion.   Neurological:      Mental Status: She is alert and oriented to person, place, and time.   Psychiatric:         Behavior: Behavior normal.       Significant Labs:   Microbiology Results (last 7 days)       Procedure Component Value Units Date/Time    Culture, Anaerobe [[id number]] Collected: 04/16/22 1112    Order Status: Completed Specimen: Bone from Toe, Right Foot Updated: 04/20/22 0932     Anaerobic Culture Culture in progress    Blood culture #1 **CANNOT BE ORDERED STAT** [[id number]] Collected: 04/14/22 1728    Order Status: Completed Specimen: Blood from Peripheral, Forearm, Right Updated: 04/19/22 2012     Blood Culture, Routine No growth after 5 days.    Blood culture #2 **CANNOT BE ORDERED STAT** [[id number]] Collected: 04/14/22 1729    Order Status: Completed Specimen: Blood from Peripheral, Forearm, Right Updated: 04/19/22 2012     Blood Culture, Routine No growth after 5 days.    Aerobic culture [[id number]] Collected: 04/16/22 1112    Order Status: Completed Specimen: Bone from Toe, Right Foot Updated: 04/19/22 0848     Aerobic Bacterial Culture No  growth            Significant Imaging: I have reviewed all pertinent imaging results/findings within the past 24 hours.

## 2022-04-20 NOTE — NURSING
Report received per Yelitza MICHELLE.  Patient in bed resting quietly. No apparent distress noted.  Son at bedside.  No needs noted. Bed low, side rails up x3 and bed alarm activated. Care assumed.

## 2022-04-21 LAB
ALBUMIN SERPL BCP-MCNC: 2.1 G/DL (ref 3.5–5.2)
ALP SERPL-CCNC: 99 U/L (ref 55–135)
ALT SERPL W/O P-5'-P-CCNC: <5 U/L (ref 10–44)
ANION GAP SERPL CALC-SCNC: 16 MMOL/L (ref 8–16)
AST SERPL-CCNC: 21 U/L (ref 10–40)
BACTERIA SPEC ANAEROBE CULT: NORMAL
BILIRUB DIRECT SERPL-MCNC: 0.6 MG/DL (ref 0.1–0.3)
BILIRUB SERPL-MCNC: 0.9 MG/DL (ref 0.1–1)
BUN SERPL-MCNC: 44 MG/DL (ref 8–23)
CALCIUM SERPL-MCNC: 9.1 MG/DL (ref 8.7–10.5)
CHLORIDE SERPL-SCNC: 93 MMOL/L (ref 95–110)
CHLORPROPAMIDE SERPL-MCNC: NEGATIVE UG/ML
CO2 SERPL-SCNC: 23 MMOL/L (ref 23–29)
CREAT SERPL-MCNC: 5.6 MG/DL (ref 0.5–1.4)
ERYTHROCYTE [DISTWIDTH] IN BLOOD BY AUTOMATED COUNT: 14.5 % (ref 11.5–14.5)
EST. GFR  (AFRICAN AMERICAN): 7.6 ML/MIN/1.73 M^2
EST. GFR  (NON AFRICAN AMERICAN): 6.6 ML/MIN/1.73 M^2
GLIMEPIRIDE SERPL-MCNC: NEGATIVE NG/ML
GLIPIZIDE SERPL-MCNC: NEGATIVE UG/ML
GLUCOSE SERPL-MCNC: 93 MG/DL (ref 70–110)
GLYBURIDE SERPL-MCNC: NEGATIVE UG/ML
GRAM STN SPEC: NORMAL
HCT VFR BLD AUTO: 31.8 % (ref 37–48.5)
HGB BLD-MCNC: 10 G/DL (ref 12–16)
MCH RBC QN AUTO: 29.9 PG (ref 27–31)
MCHC RBC AUTO-ENTMCNC: 31.4 G/DL (ref 32–36)
MCV RBC AUTO: 95 FL (ref 82–98)
NATEGLINIDE SERPL QL: NEGATIVE
PIOGLITAZONE: NEGATIVE
PLATELET # BLD AUTO: 285 K/UL (ref 150–450)
PMV BLD AUTO: 9 FL (ref 9.2–12.9)
POCT GLUCOSE: 114 MG/DL (ref 70–110)
POCT GLUCOSE: 137 MG/DL (ref 70–110)
POCT GLUCOSE: 140 MG/DL (ref 70–110)
POCT GLUCOSE: 186 MG/DL (ref 70–110)
POTASSIUM SERPL-SCNC: 4.5 MMOL/L (ref 3.5–5.1)
PROT SERPL-MCNC: 6.4 G/DL (ref 6–8.4)
RBC # BLD AUTO: 3.34 M/UL (ref 4–5.4)
REPAGLINIDE SERPL-MCNC: NEGATIVE NG/ML
ROSIGLITAZONE: NEGATIVE
SODIUM SERPL-SCNC: 132 MMOL/L (ref 136–145)
TOLAZAMIDE SERPL-MCNC: NEGATIVE UG/ML
TOLBUTAMIDE SERPL-MCNC: NEGATIVE UG/ML
WBC # BLD AUTO: 12.98 K/UL (ref 3.9–12.7)

## 2022-04-21 PROCEDURE — 20600001 HC STEP DOWN PRIVATE ROOM

## 2022-04-21 PROCEDURE — 90935 HEMODIALYSIS ONE EVALUATION: CPT

## 2022-04-21 PROCEDURE — 99233 SBSQ HOSP IP/OBS HIGH 50: CPT | Mod: ,,, | Performed by: PODIATRIST

## 2022-04-21 PROCEDURE — 85027 COMPLETE CBC AUTOMATED: CPT | Performed by: HOSPITALIST

## 2022-04-21 PROCEDURE — 90935 HEMODIALYSIS ONE EVALUATION: CPT | Mod: ,,, | Performed by: NURSE PRACTITIONER

## 2022-04-21 PROCEDURE — 90935 PR HEMODIALYSIS, ONE EVALUATION: ICD-10-PCS | Mod: ,,, | Performed by: NURSE PRACTITIONER

## 2022-04-21 PROCEDURE — 27000221 HC OXYGEN, UP TO 24 HOURS

## 2022-04-21 PROCEDURE — 25000003 PHARM REV CODE 250: Performed by: STUDENT IN AN ORGANIZED HEALTH CARE EDUCATION/TRAINING PROGRAM

## 2022-04-21 PROCEDURE — 63600175 PHARM REV CODE 636 W HCPCS: Performed by: INTERNAL MEDICINE

## 2022-04-21 PROCEDURE — 80048 BASIC METABOLIC PNL TOTAL CA: CPT | Performed by: HOSPITALIST

## 2022-04-21 PROCEDURE — 63600175 PHARM REV CODE 636 W HCPCS: Performed by: HOSPITALIST

## 2022-04-21 PROCEDURE — 99233 PR SUBSEQUENT HOSPITAL CARE,LEVL III: ICD-10-PCS | Mod: ,,, | Performed by: PODIATRIST

## 2022-04-21 PROCEDURE — 94761 N-INVAS EAR/PLS OXIMETRY MLT: CPT

## 2022-04-21 PROCEDURE — 25000003 PHARM REV CODE 250: Performed by: HOSPITALIST

## 2022-04-21 PROCEDURE — 99900035 HC TECH TIME PER 15 MIN (STAT)

## 2022-04-21 PROCEDURE — C9113 INJ PANTOPRAZOLE SODIUM, VIA: HCPCS | Performed by: HOSPITALIST

## 2022-04-21 PROCEDURE — 36415 COLL VENOUS BLD VENIPUNCTURE: CPT | Performed by: HOSPITALIST

## 2022-04-21 PROCEDURE — 80076 HEPATIC FUNCTION PANEL: CPT | Performed by: HOSPITALIST

## 2022-04-21 RX ORDER — LISINOPRIL 5 MG/1
5 TABLET ORAL DAILY
Status: DISCONTINUED | OUTPATIENT
Start: 2022-04-21 | End: 2022-04-22

## 2022-04-21 RX ORDER — HYDRALAZINE HYDROCHLORIDE 50 MG/1
100 TABLET, FILM COATED ORAL 3 TIMES DAILY
Status: DISCONTINUED | OUTPATIENT
Start: 2022-04-21 | End: 2022-04-22 | Stop reason: HOSPADM

## 2022-04-21 RX ORDER — HYDRALAZINE HYDROCHLORIDE 25 MG/1
25 TABLET, FILM COATED ORAL EVERY 8 HOURS PRN
Status: DISCONTINUED | OUTPATIENT
Start: 2022-04-21 | End: 2022-04-22 | Stop reason: HOSPADM

## 2022-04-21 RX ORDER — ACETAMINOPHEN 500 MG
1000 TABLET ORAL EVERY 8 HOURS
Status: DISCONTINUED | OUTPATIENT
Start: 2022-04-21 | End: 2022-04-22 | Stop reason: HOSPADM

## 2022-04-21 RX ADMIN — CEFTRIAXONE 2 G: 2 INJECTION, SOLUTION INTRAVENOUS at 04:04

## 2022-04-21 RX ADMIN — ACETAMINOPHEN 1000 MG: 500 TABLET ORAL at 08:04

## 2022-04-21 RX ADMIN — HYDRALAZINE HYDROCHLORIDE 100 MG: 50 TABLET ORAL at 08:04

## 2022-04-21 RX ADMIN — ISOSORBIDE MONONITRATE 20 MG: 20 TABLET ORAL at 08:04

## 2022-04-21 RX ADMIN — CEFTRIAXONE 2 G: 2 INJECTION, SOLUTION INTRAVENOUS at 05:04

## 2022-04-21 RX ADMIN — PANTOPRAZOLE SODIUM 40 MG: 40 INJECTION, POWDER, FOR SOLUTION INTRAVENOUS at 08:04

## 2022-04-21 RX ADMIN — HYDRALAZINE HYDROCHLORIDE 100 MG: 50 TABLET ORAL at 05:04

## 2022-04-21 RX ADMIN — LEVOTHYROXINE SODIUM 137 MCG: 25 TABLET ORAL at 08:04

## 2022-04-21 RX ADMIN — CARVEDILOL 3.12 MG: 3.12 TABLET, FILM COATED ORAL at 05:04

## 2022-04-21 RX ADMIN — ACETAMINOPHEN 1000 MG: 500 TABLET ORAL at 05:04

## 2022-04-21 RX ADMIN — CARVEDILOL 3.12 MG: 3.12 TABLET, FILM COATED ORAL at 08:04

## 2022-04-21 RX ADMIN — POLYETHYLENE GLYCOL 3350 17 G: 17 POWDER, FOR SOLUTION ORAL at 08:04

## 2022-04-21 NOTE — NURSING
Report received from Clementina MICHELLE.  Patient in bed resting quietly.  No apparent distress noted. Patient requested med for headache.  No other needs noted. Bed low, side rails up x3 and call light within reach.  Care assumed.

## 2022-04-21 NOTE — ANESTHESIA POSTPROCEDURE EVALUATION
Anesthesia Post Evaluation    Patient: Erika Perera    Procedure(s) Performed: Procedure(s) (LRB):  EVISCERATION, OCULAR CONTENTS (Left)  BLEPHARORRHAPHY (Left)  INJECTION, MEDICATION, RETROBULBAR (Left)    Final Anesthesia Type: general      Patient location during evaluation: PACU  Patient participation: Yes- Able to Participate  Level of consciousness: awake  Post-procedure vital signs: reviewed and stable  Pain management: adequate  Airway patency: patent    PONV status at discharge: No PONV  Anesthetic complications: no      Cardiovascular status: blood pressure returned to baseline  Respiratory status: unassisted  Hydration status: euvolemic  Follow-up not needed.          Vitals Value Taken Time   /72 04/21/22 1801   Temp 36.8 °C (98.3 °F) 04/21/22 1745   Pulse 66 04/21/22 1829   Resp 14 04/21/22 1829   SpO2 99 % 04/21/22 1829   Vitals shown include unvalidated device data.      Event Time   Out of Recovery 04/20/2022 22:15:00         Pain/Gabe Score: Pain Rating Prior to Med Admin: 0 (4/21/2022  5:47 PM)  Pain Rating Post Med Admin: 2 (4/21/2022  8:55 AM)  Gabe Score: 8 (4/20/2022 10:15 PM)

## 2022-04-21 NOTE — PROGRESS NOTES
04/21/22 1330   Post-Hemodialysis Assessment   Rinseback Volume (mL) 300 mL   Blood Volume Processed (Liters) 47.2 L   Dialyzer Clearance Moderately streaked   Duration of Treatment (minutes) 210 minutes   Additional Fluid Intake (mL) 300 mL   Total UF (mL) 2610 mL   Net Fluid Removal 2010   Patient Response to Treatment tolerated   Arterial bleeding stop time (min) 7 min   Venous bleeding stop time (min) 7 min   Post-Hemodialysis Comments see note     HD complete. Net removal 2010 ml. NAD, VSS.

## 2022-04-21 NOTE — PROGRESS NOTES
Pt arrived via bed. Pt awake, alert, VSS. HD started to left arm graft site. Some swelling noted to side to graft site prior to TX start. Will monitor. NAD

## 2022-04-21 NOTE — PLAN OF CARE
04/21/22 0856   Discharge Reassessment   Assessment Type Discharge Planning Reassessment   Did the patient's condition or plan change since previous assessment? No   Discharge Plan discussed with: Adult children;Patient   Communicated ROM with patient/caregiver Yes   Discharge Plan A Home with family;Home Health   Discharge Plan B Home Health;Home with family   DME Needed Upon Discharge  other (see comments)  (TBD)   Discharge Barriers Identified None   Why the patient remains in the hospital Requires continued medical care   Post-Acute Status   Post-Acute Authorization Home Health   Home Health Status Referrals Sent   Coverage [employer]   Discharge Delays None known at this time     Patient may discharge today. Patient was previously receiving HH services from [facility].   Contacted [facility]  and advised them that the patient may discharge today. They reported that they would advise Darlene, the nurse.   Contacted [facility] in [place] ([phone]). They reported that the patient is scheduled for dialysis tomorrow morning at 7:20AM.   SW left  for patient's son, Vincent, [phone].     Adwoa Saldana, [facility] [place]  [phone]

## 2022-04-21 NOTE — PLAN OF CARE
Problem: Hemodynamic Instability (Hemodialysis)  Goal: Effective Tissue Perfusion  4/21/2022 1132 by Judie Cruz RN  Outcome: Ongoing, Progressing  4/21/2022 1131 by Judie Cruz RN  Outcome: Ongoing, Progressing

## 2022-04-21 NOTE — PROGRESS NOTES
[facility] H[facility]ODIAL[facility]IS NOTE    Erika Perera is a 82 y.o. female currently on hemodialysis for removal of uremic toxins and volume.     Patient seen and evaluated on hemodialysis, tolerating treatment, see HD flowsheet for vitals and assessments.    No Hypotension, chest pain, shortness of breath, cramping, nausea or vomiting.      Labs have been reviewed and the dialysate bath has been adjusted.     Labs:     Recent Labs   Lab 04/15/22  0238 04/16/22  0357 04/19/22  0416 04/20/22 0349 04/21/22  0327   *   < > 131* 135* 132*   K 4.2   < > 4.9 4.1 4.5   CL 95   < > 95 97 93*   CO2 26   < > 20* 26 23   BUN 38*   < > 55* 36* 44*   CREATININE 4.7*   < > 6.2* 4.7* 5.6*   CALCIUM 8.7   < > 8.6* 8.4* 9.1   PHOS 4.1  --   --   --   --     < > = values in this interval not displayed.     Recent Labs   Lab 04/19/22  0416 04/20/22  0349 04/21/22  0327   WBC 10.87 9.26 12.98*   HGB 9.6* 9.9* 10.0*   HCT 31.3* 31.2* 31.8*    268 285     No results found for: FESATURATED, FERRITIN     Assessment/Plan    Ultrafiltration goal: 2 L as tolerated, keep MAP >65.  - Seen on dialysis today, tolerating session with current UFR, no complications.    - s/p procedure L evisceration with orbital implant placement on yesterday.   - No lab stick/BP intake on access site  - Continue to monitor intake and output, daily weights   - Please avoid gadolinium, fleets, phos-based laxatives, NSAIDs  - Will continue dialysis treatments while in-patient    Anemia  - Hgb 10.0   - EPO can be administered and dosed per his OP unit upon discharge.  - Continue MOHAMUD therapy while inpatient     BMM  - Renal diet with protein intake goal 1.5 g/kg/d  - Novasource with meals  - F/U PO4, Mg, Calcium. And albumin levels. *      Annmarie Rg, COLT, APRN, FNP-C  Nephrology Department  Pager:  [phone]

## 2022-04-21 NOTE — PROGRESS NOTES
[facility] - [facility] ([address] Medicine  Progress Note    Patient Name: Erika Perera  MRN: [id number]  Patient Class: IP- Inpatient   Admission Date: 4/14/2022  Length of Stay: 7 days  Attending Physician: Nba Carmichael MD  Primary Care Provider: Minor Bennett MD        Subjective:     Principal Problem:Endophthalmitis        HPI:  81 yo F with PMHx ESRD, CAD, chronic diastolic heart failure, HTN, DM2, and HLD who presents to the ED from ophthalmology clinic for L eye pain and swelling x 1 week. Pt. Reports that she has had progressively worsening L eye pain and vision loss over the course of the last week. Pt. Reports developing floaters and slowly worsening vision loss with swelling of her upper and lower eyelids. Pt. States that she has developed worsening pain in addition to the swelling over the last couple of days which prompted her to schedule an appointment with optometry. Pt. Seen in optometry clinic and was referred to ophthalmology clinic today over concerns for endophthalmitis. She was then sent to the ED for CT orbits and admit for likely need for enucleation and evisceration due to the severity of the disease. Of note, patient was recently admitted to [facility] 4/5-/48 for Klebsiella pneumoniae bacteremia thought to be 2/2 UTI. Culture results show pansensitive klebsiella, and the patient was discharged on PO ciprofloxacin and reports compliance with the regimen. Pt. Denies any fevers, chills, eye discharge, or redness.      Overview/Hospital Course:  Patient admitted to hospital service.  Ophthalmology, ID, nephrology consulted.  Started on ceftriaxone. Blood cx collected.  CT scan demonstrated bilateral exophthalmos and inflammatory changes involving left globe.  During admission, patient noted hypoglycemic.  She has had issues at home with the same.  She takes sulfonylurea at home and is a HD patient.  Started on D10gtt, SQ octreotide, glucagon, regular renal diet, q2hr  accuchecks.  Also noted to be anemic on admission thought to be secondary to ESRD and acute infection.  Nurse noted maroon stools.  Patient received total of 3U PRBC.  GI consulted and recommended outpatient colonoscopy as well as stool softner and increase fiber diet.  Placed on IV PPI with serial Hb.  Stopped asa and ticagrelor.  Patient noted to have new right first toe ulcer.  Xray indicated osteomyelitis.  Podiatry consulted and performed bedside debridement.  Specimen sent to micro and path.  Incidental adrenal mass found on abdominal US.  Follow up with CT scan recommended at some point in the future. Patient to OR on Wed 4/20 for surgery to left eye. Awaiting to see if eye culture from yesterday 4/20 reveals anything, likely d/c tomorrow with IV ceftriaxone.       Interval History: No acute events overnight.  Afebrile.  Still having eye pain. +HA    Review of Systems   Constitutional:  Positive for appetite change. Negative for fever.   Eyes:  Positive for photophobia, pain and visual disturbance.   Respiratory:  Negative for cough and shortness of breath.    Cardiovascular:  Negative for chest pain and leg swelling.   Gastrointestinal:  Negative for abdominal pain, blood in stool, nausea and vomiting.   Genitourinary:         Anuric    Musculoskeletal:  Negative for back pain and myalgias.   Neurological:  Positive for headaches.   Objective:     Vital Signs (Most Recent):  Temp: 98.4 °F (36.9 °C) (04/21/22 0735)  Pulse: (!) 58 (04/21/22 1300)  Resp: 16 (04/21/22 1045)  BP: (!) 154/70 (04/21/22 1300)  SpO2: 98 % (04/21/22 0855)   Vital Signs (24h Range):  Temp:  [97.2 °F (36.2 °C)-98.4 °F (36.9 °C)] 98.4 °F (36.9 °C)  Pulse:  [57-80] 58  Resp:  [11-20] 16  SpO2:  [95 %-100 %] 98 %  BP: (123-209)/(56-83) 154/70     Weight: 81.6 kg (180 lb)  Body mass index is 29.95 kg/m².    Intake/Output Summary (Last 24 hours) at 4/21/2022 1459  Last data filed at 4/20/2022 2108  Gross per 24 hour   Intake 700 ml   Output  --   Net 700 ml        Physical Exam  Constitutional:       Appearance: Normal appearance.   HENT:      Head: Normocephalic and atraumatic.   Eyes:      General:         Left eye: Discharge present.     Conjunctiva/sclera:      Left eye: Exudate present.   Cardiovascular:      Rate and Rhythm: Normal rate and regular rhythm.   Pulmonary:      Effort: Pulmonary effort is normal.      Breath sounds: Normal breath sounds.   Abdominal:      General: Bowel sounds are normal.      Palpations: Abdomen is soft.   Musculoskeletal:         General: No deformity.      Right lower leg: No edema.      Left lower leg: No edema.   Skin:     General: Skin is warm and dry.   Neurological:      General: No focal deficit present.      Mental Status: She is alert and oriented to person, place, and time.   Psychiatric:         Mood and Affect: Mood normal.         Behavior: Behavior normal.       Significant Labs: All pertinent labs within the past 24 hours have been reviewed.    Significant Imaging: I have reviewed all pertinent imaging results/findings within the past 24 hours.      Assessment/Plan:      * Endophthalmitis  -- Opthalmology consulted and has seen patient; plan for surgical management on Wednesday 4/20  -- CT orbits shows minimal asymmetric enhancement at the periphery of the left globe compared to the right could represent a mild inflammatory or infectious process  - Noted to have recent Klebsiella pneumoniae bacteremia treated with cipro, potential source?. Repeat blood cultures ordered.   - Abx per ID; currently receiving ceftriaxone     Adrenal mass, right  - Incidentally found on abdominal US.  - will need further imaging at some point in the future.     Secondary hypothyroidism  - restart home dose levothyroxine  - TSH WNL    Subacute osteomyelitis of right foot  - patient went to [facility] for pedicure two weeks ago and developed ulcer right first toe  - XRAY demonstrates evidence of osteomyelitis   - Continue  abx coverage  - consult podiatry -- bedside debridement  - follow up cx and path--> negative so far      Gastrointestinal bleeding  - IV PPI bid-->change to PO PPI  - serial Hb/Hct-->stable  - transfuse total 3U PRBC  - stop ASA and prophylactic heparin   - GI consulted-- recommended stool softner, high fiber, outpatient colon  - NOTE patient was recently started on ticagrelor at another hospital after a type II MI secondary to sepsis. This is being held as well.     Hypoglycemia  - secondary to sulfonylurea and active infection   - SQ octreotide, D10, Glucagon, regular diet    - transferring to higher level of care for q2hr accuchecks   - 4/17- finally improving   - 4/18- no longer on D10 gtt; transfer to regular med/surg floor   -4/19- resolved     Bacteremia due to Klebsiella pneumoniae  -Recently diagnosed based on blood cultures done at [facility] 4/5, pansensitive  -Suspect bacteremia may be cause of Endogenous bacterial endophthalmitis, repeat blood cultures ordered  - Consult ID        HTN (hypertension)  -Continue home coreg, hydralazine, and isosorbide    DM2 (diabetes mellitus, type 2)  -A1c ordered and pending  - Hold diabetes medication secondary to hypoglycemia  - discontinue glimepiride indefinitely   - blood glucose levels well controlled without medication at this time       CAD (coronary artery disease)  -No complaints of chest pain, baseline EKG ordered. TTE ordered to rule out vegetetation in setting of bacteremia with concern for endogenous bacterial endophthalmitis--> so far blood cx negative   -Continue home statin  - holding asa and ticagrelor secondary to GI bleed       ESRD (end stage renal disease)  - No acue issues or need for urgent HD, nephrology consulted for regular HD while admitted        VTE Risk Mitigation (From admission, onward)         Ordered     heparin (porcine) injection 1,000 Units  As needed (PRN)         04/15/22 7722     IP VTE HIGH RISK PATIENT  Once         04/14/22 7683      Place sequential compression device  Until discontinued         04/14/22 1942                Discharge Planning   ROM: 4/22/2022     Code Status: Full Code   Is the patient medically ready for discharge?: No    Reason for patient still in hospital (select all that apply): Patient trending condition, Treatment and Consult recommendations  Discharge Plan A: Home with family, Home Health   Discharge Delays: None known at this time              Nba Carmichael MD  Department of Hospital Medicine   [facility] - Intensive Care ([place]-[facility])

## 2022-04-21 NOTE — PROGRESS NOTES
Blayne colored stool noted while cleaning patient, sample collect. Notified Dr. Yanira MD states no need for stool sample will add hepatic panel in to AM labs.

## 2022-04-21 NOTE — SUBJECTIVE & OBJECTIVE
Interval History: No acute events overnight.  Afebrile.  Still having eye pain. +HA    Review of Systems   Constitutional:  Positive for appetite change. Negative for fever.   Eyes:  Positive for photophobia, pain and visual disturbance.   Respiratory:  Negative for cough and shortness of breath.    Cardiovascular:  Negative for chest pain and leg swelling.   Gastrointestinal:  Negative for abdominal pain, blood in stool, nausea and vomiting.   Genitourinary:         Anuric    Musculoskeletal:  Negative for back pain and myalgias.   Neurological:  Positive for headaches.   Objective:     Vital Signs (Most Recent):  Temp: 98.4 °F (36.9 °C) (04/21/22 0735)  Pulse: (!) 58 (04/21/22 1300)  Resp: 16 (04/21/22 1045)  BP: (!) 154/70 (04/21/22 1300)  SpO2: 98 % (04/21/22 0855)   Vital Signs (24h Range):  Temp:  [97.2 °F (36.2 °C)-98.4 °F (36.9 °C)] 98.4 °F (36.9 °C)  Pulse:  [57-80] 58  Resp:  [11-20] 16  SpO2:  [95 %-100 %] 98 %  BP: (123-209)/(56-83) 154/70     Weight: 81.6 kg (180 lb)  Body mass index is 29.95 kg/m².    Intake/Output Summary (Last 24 hours) at 4/21/2022 1459  Last data filed at 4/20/2022 2108  Gross per 24 hour   Intake 700 ml   Output --   Net 700 ml        Physical Exam  Constitutional:       Appearance: Normal appearance.   HENT:      Head: Normocephalic and atraumatic.   Eyes:      General:         Left eye: Discharge present.     Conjunctiva/sclera:      Left eye: Exudate present.   Cardiovascular:      Rate and Rhythm: Normal rate and regular rhythm.   Pulmonary:      Effort: Pulmonary effort is normal.      Breath sounds: Normal breath sounds.   Abdominal:      General: Bowel sounds are normal.      Palpations: Abdomen is soft.   Musculoskeletal:         General: No deformity.      Right lower leg: No edema.      Left lower leg: No edema.   Skin:     General: Skin is warm and dry.   Neurological:      General: No focal deficit present.      Mental Status: She is alert and oriented to person,  place, and time.   Psychiatric:         Mood and Affect: Mood normal.         Behavior: Behavior normal.       Significant Labs: All pertinent labs within the past 24 hours have been reviewed.    Significant Imaging: I have reviewed all pertinent imaging results/findings within the past 24 hours.

## 2022-04-21 NOTE — ASSESSMENT & PLAN NOTE
Right foot xray suggestive of osteomyelitis of the distal phalanx at the right great toe.     -Bone cultures right 1st distal phalanx with no growth to date thus far.  - Continue IV ABx per ID  - Right great toe now with newly formed epithelial tissue, healing well.   - No weightbearing restrictions  - Continue local wound care with iodosorb, mepilex border. Nursing orders placed.  - Podiatry will follow at a distance    DC Instructions:  Patient is to follow up with podiatry within 10-14 days of discharge.  Podiatry will arrange an appointment. Wound care instructions: Q2 days:  Cleanse right great toe wound with saline or wound cleanser, apply iodosorb to wound, cover with mepilex border.

## 2022-04-21 NOTE — TRANSFER OF CARE
"Anesthesia Transfer of Care Note    Patient: Erika Perera    Procedure(s) Performed: Procedure(s) (LRB):  EVISCERATION, OCULAR CONTENTS (Left)  BLEPHARORRHAPHY (Left)  INJECTION, MEDICATION, RETROBULBAR (Left)    Patient location: PACU    Anesthesia Type: general    Transport from OR: Transported from OR on 6-10 L/min O2 by face mask with adequate spontaneous ventilation    Post pain: adequate analgesia    Post assessment: tolerated procedure well and no apparent anesthetic complications    Post vital signs: stable    Level of consciousness: lethargic    Nausea/Vomiting: no nausea/vomiting    Complications: none    Transfer of care protocol was followed      Last vitals:   Visit Vitals  BP (!) 201/83   Pulse 68   Temp 36.9 °C (98.4 °F) (Oral)   Resp 14   Ht 5' 5" (1.651 m)   Wt 81.6 kg (180 lb)   SpO2 98%   BMI 29.95 kg/m²     "

## 2022-04-21 NOTE — PLAN OF CARE
Problem: Adult Inpatient Plan of Care  Goal: Plan of Care Review  Outcome: Ongoing, Progressing  Goal: Patient-Specific Goal (Individualized)  Outcome: Ongoing, Progressing  Goal: Absence of Hospital-Acquired Illness or Injury  Outcome: Ongoing, Progressing  Goal: Optimal Comfort and Wellbeing  Outcome: Ongoing, Progressing  Goal: Readiness for Transition of Care  Outcome: Ongoing, Progressing     Problem: Diabetes Comorbidity  Goal: Blood Glucose Level Within Targeted Range  Outcome: Ongoing, Progressing     Problem: Skin Injury Risk Increased  Goal: Skin Health and Integrity  Outcome: Ongoing, Progressing     Problem: Device-Related Complication Risk (Hemodialysis)  Goal: Safe, Effective Therapy Delivery  Outcome: Ongoing, Progressing     Problem: Hemodynamic Instability (Hemodialysis)  Goal: Effective Tissue Perfusion  Outcome: Ongoing, Progressing     Problem: Infection (Hemodialysis)  Goal: Absence of Infection Signs and Symptoms  Outcome: Ongoing, Progressing     Problem: Impaired Wound Healing  Goal: Optimal Wound Healing  Outcome: Ongoing, Progressing     Problem: Infection  Goal: Absence of Infection Signs and Symptoms  Outcome: Ongoing, Progressing     Problem: Fall Injury Risk  Goal: Absence of Fall and Fall-Related Injury  Outcome: Ongoing, Progressing

## 2022-04-21 NOTE — ASSESSMENT & PLAN NOTE
81 y/o female with a hx of ESRD on HD MWF vis AV LUE, CAD, HF, HTN, DM2, and recent admission to OSH 4/5-4/8 for Klebsiella pneumoniae bacteremia thought to be 2/2 UTI. Culture results show pansensitive klebsiella, and the patient was discharged on PO ciprofloxacin. Now with eye swelling and vision changes for 1-2 weeks.  Seen by ophthalmology with concern for endophthalmitis - CT orbit with minimal asymmetric enhancement at the periphery of the left globe compared to the right could represent a mild inflammatory or infectious process. Due to lack of trauma and wounds to the eye, expect hematogenous.  Source of her bacteremia could be urinary vs osteomyelitis of toe.  Klebsiella pneumoniae is also known to cause liver abscesses as well.  U/S abdomen with ?right adrenal mass      Bcx 4/14 NGTD    Recommendations:  -Continue ceftriaxone 2 g IV q 12.  -Follow up Bcx.  -Follow up left eyelid would cultures obtained on 4/20  -Appreciate ophthalmology input - POD#1 left eye evisceration with orbital implant.

## 2022-04-21 NOTE — SUBJECTIVE & OBJECTIVE
Interval History: Afebrile. Denies HA. POD #1 Left eye evisceration. Tolerating IV antibiotics well.        Review of Systems   Constitutional:  Positive for activity change. Negative for appetite change, chills and fever.   HENT:  Negative for congestion.    Eyes:  Positive for pain and visual disturbance (left eye). Negative for itching.   Respiratory:  Negative for cough, chest tightness and shortness of breath.    Cardiovascular:  Negative for palpitations and leg swelling.   Gastrointestinal:  Positive for nausea. Negative for abdominal pain and diarrhea.   Endocrine: Negative for polyphagia and polyuria.   Genitourinary:  Negative for dysuria and frequency.   Musculoskeletal:  Negative for back pain.   Neurological:  Negative for numbness and headaches.   Psychiatric/Behavioral:  Negative for agitation and behavioral problems.    Objective:     Vital Signs (Most Recent):  Temp: 98.4 °F (36.9 °C) (04/21/22 0735)  Pulse: (!) 58 (04/21/22 1300)  Resp: 16 (04/21/22 1045)  BP: (!) 154/70 (04/21/22 1300)  SpO2: 98 % (04/21/22 0855)   Vital Signs (24h Range):  Temp:  [97.2 °F (36.2 °C)-98.8 °F (37.1 °C)] 98.4 °F (36.9 °C)  Pulse:  [57-80] 58  Resp:  [11-24] 16  SpO2:  [95 %-100 %] 98 %  BP: (123-209)/(56-96) 154/70     Weight: 81.6 kg (180 lb)  Body mass index is 29.95 kg/m².    Estimated Creatinine Clearance: 8.2 mL/min (A) (based on SCr of 5.6 mg/dL (H)).    Physical Exam  Constitutional:       Appearance: She is well-developed.   HENT:      Head: Normocephalic.   Eyes:      General:         Left eye: Discharge present.     Conjunctiva/sclera:      Left eye: Chemosis present.   Cardiovascular:      Rate and Rhythm: Normal rate and regular rhythm.      Heart sounds: Normal heart sounds. No murmur heard.  Pulmonary:      Effort: Pulmonary effort is normal.      Breath sounds: Normal breath sounds.   Abdominal:      General: Bowel sounds are normal. There is no distension.      Palpations: Abdomen is soft.       Tenderness: There is no abdominal tenderness.   Musculoskeletal:         General: Normal range of motion.   Neurological:      Mental Status: She is alert and oriented to person, place, and time.   Psychiatric:         Behavior: Behavior normal.       Significant Labs:   Microbiology Results (last 7 days)       Procedure Component Value Units Date/Time    Culture, Anaerobe [[id number]] Collected: 04/16/22 1112    Order Status: Completed Specimen: Bone from Toe, Right Foot Updated: 04/21/22 1030     Anaerobic Culture No anaerobes isolated    Gram stain [[id number]] Collected: 04/20/22 1847    Order Status: Completed Specimen: Wound from Eyelid, Left Updated: 04/21/22 0205     Gram Stain Result Rare WBC's      No organisms seen    Narrative:      Left eye    Gram stain [[id number]] Collected: 04/20/22 1913    Order Status: Completed Specimen: Wound from Eyelid, Left Updated: 04/21/22 0010     Gram Stain Result Rare WBC's      No organisms seen    Narrative:      Venturous left eye    Culture, Anaerobe [[id number]] Collected: 04/20/22 1913    Order Status: Sent Specimen: Wound from Eyelid, Left Updated: 04/20/22 1921    Aerobic culture [[id number]] Collected: 04/20/22 1913    Order Status: Sent Specimen: Wound from Eyelid, Left Updated: 04/20/22 1921    Culture, Anaerobe [[id number]] Collected: 04/20/22 1847    Order Status: Sent Specimen: Wound from Eyelid, Left Updated: 04/20/22 1920    Aerobic culture [[id number]] Collected: 04/20/22 1847    Order Status: Sent Specimen: Wound from Eyelid, Left Updated: 04/20/22 1919    Blood culture #1 **CANNOT BE ORDERED STAT** [[id number]] Collected: 04/14/22 1728    Order Status: Completed Specimen: Blood from Peripheral, Forearm, Right Updated: 04/19/22 2012     Blood Culture, Routine No growth after 5 days.    Blood culture #2 **CANNOT BE ORDERED STAT** [[id number]] Collected: 04/14/22 1729    Order Status: Completed Specimen: Blood from Peripheral, Forearm, Right Updated: 04/19/22 2012      Blood Culture, Routine No growth after 5 days.    Aerobic culture [[id number]] Collected: 04/16/22 1112    Order Status: Completed Specimen: Bone from Toe, Right Foot Updated: 04/19/22 0848     Aerobic Bacterial Culture No growth            Significant Imaging: I have reviewed all pertinent imaging results/findings within the past 24 hours.

## 2022-04-21 NOTE — OP NOTE
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]  Orbital cellulitis, left [H05.012]  Left eye pain [H57.12]  Type 2 diabetes mellitus with chronic kidney disease on chronic dialysis, without long-term current use of insulin [E11.22, N18.6, Z99.2]    Post-Operative Diagnosis: Same as pre-operative diagnosis    Treatments/Procedures:  Evisceration with orbital implant placement, left eye   Frost suture  Retrobulbar Injection    Intraoperative Findings: Endophthalmitis left eye     Anesthesia: General with retrobulbar and perilimbal block (mixture of 2% lidocaine with epinephrine, 0.5% Marcaine)    Antisepsis: Betadine    Complications: None    Estimated Blood Loss: < 20 cc    Specimens: Intraocular contents - left eye     Implants: Yoder-Por surgical implant - Quadro-Port Tunnel Orbital Sphere with Enucleation Introducer - 18mm diameter     Indications for surgery:   This is a pleasant 82 y.o.-year-old female with a 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 The patient was recommended to undergo surgical correction with evisceration. The risks, benefits, and alternatives including but not limited to pain, bleeding, infection, loss of the fellow eye, asymmetry, need for revision in future, scarring were discussed with the patient. The patient was given the opportunity to ask questions. After discussion, the patient elected to proceed. A consent document was signed, witnessed, and placed into the patient's chart.        Procedure in detail:       The patient was brought to the operating room and placed in supine position.  A time out was performed including patient's name, date of birth, allergies, surgical site location, and anticipated procedure. A rigid shield was over the right eye to protect it  from inadvertent injury during the procedure. The patient was placed under general anesthesia. After adequate anesthesia was achieved, approximately 2.5 cc of a mixture of 2% lidocaine with 1:200,000 epinephrine, 0.5% bupivacaine was injected into the retrobulbar space. An additional 0.5 cc of local was injected into the subconjunctival space 360 degrees around the limbus. The full face was prepped and draped in the usual sterile fashion for oculoplastic surgery using topical Betadine.     A speculum was placed to hold open the eyelids. A 360 degree conjunctival peritomy was performed using a blunt westcotts scissors. A stab incision was made at the limbus with an 11 blade. The cornea was removed with wescotts. A large amount of purulent material was seen in the eye which was sent for culture. An evisceration spoon was used to remove the intraocular contents including all of the uvea. A swab was used to scrape the posterior surface of the cornea to send for culture. The anterior ciliary vessels were cauterized on the inner aspect of the sclera. The optic nerve was cauterized with bipolar cautery. Absolute alcohol was used on cotton tip applicators on the inside of the scleral shell. The shell was then irrigated with multiple bottles of BSS. Posterior sclerotomies were performed with monopolar cautery. Relaxing anterior sclerotomies were performed at 3 and 9 o'clock with patel. The implant was placed into the scleral shell. The sclera was closed with 5-0 vicryl in a buried interrupted fashion. Tenons was found to be friable and difficult to manipulate, but was able to be closed with 5-0 vicryl in a buried interrupted fashion. Conjunctiva was then closed with 6-0 vicryl in a running fashion. Tobradex eyedrops and ointment was placed into the socket. A second retrobulbar injection of local anesthetic was performed with 2cc for post op pain control. The eyelids were closed with a temporary tarsorrhaphy using 4-0 silk.  A pressure patch with mastisol was placed. The patient tolerated the procedure well.     The eye patch will stay in place for roughly 1 week until it is removed in clinic at her 1 week outpatient follow up.

## 2022-04-21 NOTE — PROGRESS NOTES
Dr. Nba Carmichael at bedside to witness hypertensive episode.  Hydralazine 100 mg ordered. Monitoring.    04/21/22 0735   Vital Signs   Temp 98.4 °F (36.9 °C)   Temp src Oral   Pulse 69   Resp 14   SpO2 99 %   BP (!) 201/83   MAP (mmHg) 119

## 2022-04-21 NOTE — PROGRESS NOTES
[facility] ([place] [address])  Podiatry  Progress Note    Patient Name: Erika Perera  MRN: [id number]  Admission Date: 4/14/2022  Hospital Length of Stay: 6 days  Attending Physician: Ila Garcia MD  Primary Care Provider: Minor Bennett MD     Subjective:     Interval History:   No acute events overnight. Remains afebrile, no leukocytosis. Bone cultures with no growth to date thus far. Patient denies any foot pain. Right great toe healing well. Family at bedside.     Scheduled Meds:   [START ON 4/21/2022] sodium chloride 0.9%   Intravenous Once    BUPivacaine (PF) 0.5% (5 mg/mL)        carvediloL  3.125 mg Oral BID WM    cefTRIAXone (ROCEPHIN) IVPB  2 g Intravenous Q12H    clindamycin        dehydrated (ethyl alcohol)  5 mL INTRANEURAL Once    dexamethasone        epoetin maximilian-epbx  50 Units/kg Intravenous Every Mon, Wed, Fri    fluticasone furoate-vilanteroL  1 puff Inhalation Daily    gelatin adsorbable 12-7 mm top sponge  1 each Topical (Top) Once in dialysis    hydrALAZINE  25 mg Oral TID    isosorbide mononitrate  20 mg Oral BID    levothyroxine  137 mcg Oral Daily    LIDOcaine-EPINEPHrine (PF) 2%-1:200,000        neomycin-polymyxin-dexamethasone        pantoprozole (PROTONIX) IV  40 mg Intravenous Q12H    polyethylene glycol  17 g Oral Daily    tobramycin-dexamethasone 0.3-0.1%         Continuous Infusions:  PRN Meds:sodium chloride, sodium chloride 0.9%, acetaminophen, albuterol-ipratropium, BUPivacaine (PF) 0.5% (5 mg/mL), cadexomer iodine, clindamycin, dextrose 10%, dextrose 10%, heparin (porcine), insulin aspart U-100, LIDOcaine-EPINEPHrine (PF) 2%-1:200,000, melatonin, naloxone, ondansetron, oxyCODONE, oxyCODONE, prochlorperazine, sodium chloride 0.9%, sodium chloride 0.9%, TETRAcaine HCl (PF)    Review of Systems   Constitutional:  Positive for activity change. Negative for appetite change, chills and fever.   HENT:  Negative for congestion.    Eyes:   Positive for pain and visual disturbance (left eye). Negative for itching.   Respiratory:  Negative for cough, chest tightness and shortness of breath.    Cardiovascular:  Negative for palpitations and leg swelling.   Gastrointestinal:  Positive for nausea. Negative for abdominal pain and diarrhea.   Endocrine: Negative for polyphagia and polyuria.   Genitourinary:  Negative for dysuria and frequency.   Musculoskeletal:  Negative for back pain.   Neurological:  Negative for numbness and headaches.   Psychiatric/Behavioral:  Negative for agitation and behavioral problems.    Objective:     Vital Signs (Most Recent):  Temp: 98.8 °F (37.1 °C) (04/20/22 1411)  Pulse: 60 (04/20/22 1149)  Resp: (!) 24 (04/20/22 1411)  BP: (!) 173/96 (04/20/22 1411)  SpO2: 97 % (04/20/22 1627)   Vital Signs (24h Range):  Temp:  [98.3 °F (36.8 °C)-99.2 °F (37.3 °C)] 98.8 °F (37.1 °C)  Pulse:  [57-73] 60  Resp:  [15-27] 24  SpO2:  [93 %-98 %] 97 %  BP: (138-173)/(60-96) 173/96     Weight: 81.6 kg (180 lb)  Body mass index is 29.95 kg/m².    Foot Exam    General  Orientation: alert and oriented to person, place, and time       Right Foot/Ankle     Inspection and Palpation  Tenderness: none   Swelling: none   Skin Exam: skin intact; no drainage, no cellulitis, no abnormal color, no ulcer and no erythema     Neurovascular  Dorsalis pedis: 2+  Posterior tibial: 1+      Left Foot/Ankle      Inspection and Palpation  Tenderness: none   Swelling: none   Skin Exam: skin intact; no drainage, no cellulitis, no abnormal color, no ulcer and no erythema     Neurovascular  Dorsalis pedis: 2+  Posterior tibial: 1+        Laboratory:  CBC:   Recent Labs   Lab 04/20/22  0349   WBC 9.26   RBC 3.36*   HGB 9.9*   HCT 31.2*      MCV 93   MCH 29.5   MCHC 31.7*     CRP: No results for input(s): CRP in the last 168 hours.  ESR: No results for input(s): SEDRATE in the last 168 hours.  Wound Cultures:   Recent Labs   Lab 04/16/22  1112   LABAERO No growth      Microbiology Results (last 7 days)       Procedure Component Value Units Date/Time    Culture, Anaerobe [[id number]] Collected: 04/20/22 1847    Order Status: Sent Specimen: Wound from Eyelid, Left Updated: 04/20/22 1847    Aerobic culture [[id number]] Collected: 04/20/22 1847    Order Status: Sent Specimen: Wound from Eyelid, Left Updated: 04/20/22 1847    Gram stain [[id number]] Collected: 04/20/22 1847    Order Status: Sent Specimen: Wound from Eyelid, Left Updated: 04/20/22 1847    Culture, Anaerobe [[id number]] Collected: 04/16/22 1112    Order Status: Completed Specimen: Bone from Toe, Right Foot Updated: 04/20/22 0932     Anaerobic Culture Culture in progress    Blood culture #1 **CANNOT BE ORDERED STAT** [[id number]] Collected: 04/14/22 1728    Order Status: Completed Specimen: Blood from Peripheral, Forearm, Right Updated: 04/19/22 2012     Blood Culture, Routine No growth after 5 days.    Blood culture #2 **CANNOT BE ORDERED STAT** [[id number]] Collected: 04/14/22 1729    Order Status: Completed Specimen: Blood from Peripheral, Forearm, Right Updated: 04/19/22 2012     Blood Culture, Routine No growth after 5 days.    Aerobic culture [[id number]] Collected: 04/16/22 1112    Order Status: Completed Specimen: Bone from Toe, Right Foot Updated: 04/19/22 0848     Aerobic Bacterial Culture No growth          Specimen (24h ago, onward)                None            Diagnostic Results:  I have reviewed all pertinent imaging results/findings within the past 24 hours.      Clinical Findings:  Right distal hallux wound with new epithelial tissue. No erythema, no drainage, no crepitus or fluctuance. No probe to bone.     4/20/2022 4/16/2022        Assessment/Plan:     Subacute osteomyelitis of right foot   Right foot xray suggestive of osteomyelitis of the distal phalanx at the right great toe.     -Bone cultures right 1st distal phalanx with no growth to date thus far.  - Continue IV ABx per ID  - Right great toe  now with newly formed epithelial tissue, healing well.   - No weightbearing restrictions  - Continue local wound care with iodosorb, mepilex border. Nursing orders placed.  - Podiatry will follow at a distance    DC Instructions:  Patient is to follow up with podiatry within 10-14 days of discharge.  Podiatry will arrange an appointment. Wound care instructions: Q2 days:  Cleanse right great toe wound with saline or wound cleanser, apply iodosorb to wound, cover with mepilex border.           Holly Mon DPM  [facility]  Podiatry PGY3  Pager: [phone]  Spectra:[phone]

## 2022-04-21 NOTE — ASSESSMENT & PLAN NOTE
Patient with x-ray evidence of right foot osteomyelitis s/p bedside I&D by podiatry- bone cultures sent- no path.     Bone cx 4/16 NGTD      Recommendation:  -Continue ceftriaxone 2 g IV q 12.   -Appreciate podiatry input  -Will follow up on cultures from right toe.

## 2022-04-21 NOTE — NURSING TRANSFER
Nursing Transfer Note      4/20/2022     Reason patient is being transferred: s/p Left ocular evisceration    Transfer To: [id number]    Transfer via bed    Transfer with 2L NC    Transported by transport techs    Medicines sent: eye drops    Any special needs or follow-up needed: routine    Chart send with patient: Yes    Notified: Son    Patient reassessed at: 4/20/22 @9595

## 2022-04-21 NOTE — PROGRESS NOTES
[facility] ([place] [address])  Infectious Disease  Progress Note    Patient Name: Erika Perera  MRN: [id number]  Admission Date: 4/14/2022  Length of Stay: 7 days  Attending Physician: Nba Carmichael MD  Primary Care Provider: Minor Bennett MD    Isolation Status: No active isolations  Assessment/Plan:      * Endophthalmitis  83 y/o female with a hx of ESRD on HD MWF vis AV LUE, CAD, HF, HTN, DM2, and recent admission to OSH 4/5-4/8 for Klebsiella pneumoniae bacteremia thought to be 2/2 UTI. Culture results show pansensitive klebsiella, and the patient was discharged on PO ciprofloxacin. Now with eye swelling and vision changes for 1-2 weeks.  Seen by ophthalmology with concern for endophthalmitis - CT orbit with minimal asymmetric enhancement at the periphery of the left globe compared to the right could represent a mild inflammatory or infectious process. Due to lack of trauma and wounds to the eye, expect hematogenous.  Source of her bacteremia could be urinary vs osteomyelitis of toe.  Klebsiella pneumoniae is also known to cause liver abscesses as well.  U/S abdomen with ?right adrenal mass      Bcx 4/14 NGTD    Recommendations:  -Continue ceftriaxone 2 g IV q 12.  -Follow up Bcx.  -Follow up left eyelid would cultures obtained on 4/20  -Appreciate ophthalmology input - POD#1 left eye evisceration with orbital implant.       Subacute osteomyelitis of right foot  Patient with x-ray evidence of right foot osteomyelitis s/p bedside I&D by podiatry- bone cultures sent- no path.     Bone cx 4/16 NGTD      Recommendation:  -Continue ceftriaxone 2 g IV q 12.   -Appreciate podiatry input  -Will follow up on cultures from right toe.                Anticipated Disposition: defer to primary team     Thank you for your consult. I will follow-up with patient. Please contact us if you have any additional questions.    Poncho Benavidez MD  Infectious Disease  [facility] ([place]  [address])    Subjective:     Principal Problem:Endophthalmitis    HPI: Erika Perera is an 81 y/o female with a hx of ESRD on HD MWF vis AV LUE, CAD, HF, HTN, DM2, and HLD who presents to the ED from ophthalmology clinic for L eye pain and swelling x 1 week.     Patient was recently admitted to OSH 4/5-4/8 for Klebsiella pneumoniae bacteremia thought to be 2/2 UTI. Culture results show pansensitive klebsiella, and the patient was discharged on PO ciprofloxacin and reports compliance with the regimen. She was Seen in optometry clinic and was referred to ophthalmology clinic 04/14 over concerns for endophthalmitis. She was then sent to the ED for CT orbits and admit for likely need for enucleation and evisceration due to the severity of the disease.    She has been having worsening vision and eye swelling for 1 week, denies any fever chills, N/V abdominal pain or diarrhea. She denies any eye truama.      Interval History: Afebrile. Denies HA. POD #1 Left eye evisceration. Tolerating IV antibiotics well.        Review of Systems   Constitutional:  Positive for activity change. Negative for appetite change, chills and fever.   HENT:  Negative for congestion.    Eyes:  Positive for pain and visual disturbance (left eye). Negative for itching.   Respiratory:  Negative for cough, chest tightness and shortness of breath.    Cardiovascular:  Negative for palpitations and leg swelling.   Gastrointestinal:  Positive for nausea. Negative for abdominal pain and diarrhea.   Endocrine: Negative for polyphagia and polyuria.   Genitourinary:  Negative for dysuria and frequency.   Musculoskeletal:  Negative for back pain.   Neurological:  Negative for numbness and headaches.   Psychiatric/Behavioral:  Negative for agitation and behavioral problems.    Objective:     Vital Signs (Most Recent):  Temp: 98.4 °F (36.9 °C) (04/21/22 0735)  Pulse: (!) 58 (04/21/22 1300)  Resp: 16 (04/21/22 1045)  BP: (!) 154/70 (04/21/22 1300)  SpO2: 98  % (04/21/22 0855)   Vital Signs (24h Range):  Temp:  [97.2 °F (36.2 °C)-98.8 °F (37.1 °C)] 98.4 °F (36.9 °C)  Pulse:  [57-80] 58  Resp:  [11-24] 16  SpO2:  [95 %-100 %] 98 %  BP: (123-209)/(56-96) 154/70     Weight: 81.6 kg (180 lb)  Body mass index is 29.95 kg/m².    Estimated Creatinine Clearance: 8.2 mL/min (A) (based on SCr of 5.6 mg/dL (H)).    Physical Exam  Constitutional:       Appearance: She is well-developed.   HENT:      Head: Normocephalic.   Eyes:      General:         Left eye: Discharge present.     Conjunctiva/sclera:      Left eye: Chemosis present.   Cardiovascular:      Rate and Rhythm: Normal rate and regular rhythm.      Heart sounds: Normal heart sounds. No murmur heard.  Pulmonary:      Effort: Pulmonary effort is normal.      Breath sounds: Normal breath sounds.   Abdominal:      General: Bowel sounds are normal. There is no distension.      Palpations: Abdomen is soft.      Tenderness: There is no abdominal tenderness.   Musculoskeletal:         General: Normal range of motion.   Neurological:      Mental Status: She is alert and oriented to person, place, and time.   Psychiatric:         Behavior: Behavior normal.       Significant Labs:   Microbiology Results (last 7 days)       Procedure Component Value Units Date/Time    Culture, Anaerobe [[id number]] Collected: 04/16/22 1112    Order Status: Completed Specimen: Bone from Toe, Right Foot Updated: 04/21/22 1030     Anaerobic Culture No anaerobes isolated    Gram stain [[id number]] Collected: 04/20/22 1847    Order Status: Completed Specimen: Wound from Eyelid, Left Updated: 04/21/22 0205     Gram Stain Result Rare WBC's      No organisms seen    Narrative:      Left eye    Gram stain [[id number]] Collected: 04/20/22 1913    Order Status: Completed Specimen: Wound from Eyelid, Left Updated: 04/21/22 0010     Gram Stain Result Rare WBC's      No organisms seen    Narrative:      Venturous left eye    Culture, Anaerobe [[id number]] Collected:  04/20/22 1913    Order Status: Sent Specimen: Wound from Eyelid, Left Updated: 04/20/22 1921    Aerobic culture [[id number]] Collected: 04/20/22 1913    Order Status: Sent Specimen: Wound from Eyelid, Left Updated: 04/20/22 1921    Culture, Anaerobe [[id number]] Collected: 04/20/22 1847    Order Status: Sent Specimen: Wound from Eyelid, Left Updated: 04/20/22 1920    Aerobic culture [[id number]] Collected: 04/20/22 1847    Order Status: Sent Specimen: Wound from Eyelid, Left Updated: 04/20/22 1919    Blood culture #1 **CANNOT BE ORDERED STAT** [[id number]] Collected: 04/14/22 1728    Order Status: Completed Specimen: Blood from Peripheral, Forearm, Right Updated: 04/19/22 2012     Blood Culture, Routine No growth after 5 days.    Blood culture #2 **CANNOT BE ORDERED STAT** [[id number]] Collected: 04/14/22 1729    Order Status: Completed Specimen: Blood from Peripheral, Forearm, Right Updated: 04/19/22 2012     Blood Culture, Routine No growth after 5 days.    Aerobic culture [[id number]] Collected: 04/16/22 1112    Order Status: Completed Specimen: Bone from Toe, Right Foot Updated: 04/19/22 0848     Aerobic Bacterial Culture No growth            Significant Imaging: I have reviewed all pertinent imaging results/findings within the past 24 hours.

## 2022-04-21 NOTE — PLAN OF CARE
Problem: Diabetes Comorbidity  Goal: Blood Glucose Level Within Targeted Range  Outcome: Ongoing, Progressing     Problem: Skin Injury Risk Increased  Goal: Skin Health and Integrity  Outcome: Ongoing, Progressing     Problem: Fall Injury Risk  Goal: Absence of Fall and Fall-Related Injury  Outcome: Ongoing, Progressing

## 2022-04-22 VITALS
SYSTOLIC BLOOD PRESSURE: 147 MMHG | TEMPERATURE: 99 F | RESPIRATION RATE: 37 BRPM | HEART RATE: 60 BPM | BODY MASS INDEX: 29.99 KG/M2 | DIASTOLIC BLOOD PRESSURE: 66 MMHG | WEIGHT: 180 LBS | HEIGHT: 65 IN | OXYGEN SATURATION: 95 %

## 2022-04-22 LAB
ALBUMIN SERPL BCP-MCNC: 2 G/DL (ref 3.5–5.2)
ALP SERPL-CCNC: 107 U/L (ref 55–135)
ALT SERPL W/O P-5'-P-CCNC: 6 U/L (ref 10–44)
ANION GAP SERPL CALC-SCNC: 12 MMOL/L (ref 8–16)
ANION GAP SERPL CALC-SCNC: 13 MMOL/L (ref 8–16)
AST SERPL-CCNC: 16 U/L (ref 10–40)
BASOPHILS # BLD AUTO: 0.02 K/UL (ref 0–0.2)
BASOPHILS NFR BLD: 0.2 % (ref 0–1.9)
BILIRUB SERPL-MCNC: 0.7 MG/DL (ref 0.1–1)
BUN SERPL-MCNC: 34 MG/DL (ref 8–23)
BUN SERPL-MCNC: 34 MG/DL (ref 8–23)
CALCIUM SERPL-MCNC: 8.7 MG/DL (ref 8.7–10.5)
CALCIUM SERPL-MCNC: 9 MG/DL (ref 8.7–10.5)
CHLORIDE SERPL-SCNC: 96 MMOL/L (ref 95–110)
CHLORIDE SERPL-SCNC: 97 MMOL/L (ref 95–110)
CO2 SERPL-SCNC: 23 MMOL/L (ref 23–29)
CO2 SERPL-SCNC: 25 MMOL/L (ref 23–29)
CREAT SERPL-MCNC: 4.9 MG/DL (ref 0.5–1.4)
CREAT SERPL-MCNC: 5 MG/DL (ref 0.5–1.4)
DIFFERENTIAL METHOD: ABNORMAL
EOSINOPHIL # BLD AUTO: 0 K/UL (ref 0–0.5)
EOSINOPHIL NFR BLD: 0.3 % (ref 0–8)
ERYTHROCYTE [DISTWIDTH] IN BLOOD BY AUTOMATED COUNT: 14.5 % (ref 11.5–14.5)
EST. GFR  (AFRICAN AMERICAN): 8.7 ML/MIN/1.73 M^2
EST. GFR  (AFRICAN AMERICAN): 8.9 ML/MIN/1.73 M^2
EST. GFR  (NON AFRICAN AMERICAN): 7.5 ML/MIN/1.73 M^2
EST. GFR  (NON AFRICAN AMERICAN): 7.7 ML/MIN/1.73 M^2
GLUCOSE SERPL-MCNC: 164 MG/DL (ref 70–110)
GLUCOSE SERPL-MCNC: 168 MG/DL (ref 70–110)
HCT VFR BLD AUTO: 32.5 % (ref 37–48.5)
HGB BLD-MCNC: 9.9 G/DL (ref 12–16)
IMM GRANULOCYTES # BLD AUTO: 0.05 K/UL (ref 0–0.04)
IMM GRANULOCYTES NFR BLD AUTO: 0.4 % (ref 0–0.5)
LYMPHOCYTES # BLD AUTO: 0.9 K/UL (ref 1–4.8)
LYMPHOCYTES NFR BLD: 7.4 % (ref 18–48)
MAGNESIUM SERPL-MCNC: 1.8 MG/DL (ref 1.6–2.6)
MCH RBC QN AUTO: 28.7 PG (ref 27–31)
MCHC RBC AUTO-ENTMCNC: 30.5 G/DL (ref 32–36)
MCV RBC AUTO: 94 FL (ref 82–98)
MONOCYTES # BLD AUTO: 0.6 K/UL (ref 0.3–1)
MONOCYTES NFR BLD: 5 % (ref 4–15)
NEUTROPHILS # BLD AUTO: 11 K/UL (ref 1.8–7.7)
NEUTROPHILS NFR BLD: 86.7 % (ref 38–73)
NRBC BLD-RTO: 0 /100 WBC
PHOSPHATE SERPL-MCNC: 4 MG/DL (ref 2.7–4.5)
PLATELET # BLD AUTO: 290 K/UL (ref 150–450)
PMV BLD AUTO: 9 FL (ref 9.2–12.9)
POCT GLUCOSE: 101 MG/DL (ref 70–110)
POCT GLUCOSE: 120 MG/DL (ref 70–110)
POCT GLUCOSE: 155 MG/DL (ref 70–110)
POTASSIUM SERPL-SCNC: 3.6 MMOL/L (ref 3.5–5.1)
POTASSIUM SERPL-SCNC: 3.6 MMOL/L (ref 3.5–5.1)
PROINSULIN SERPL-SCNC: 58 PMOL/L (ref 3.6–22)
PROT SERPL-MCNC: 6.2 G/DL (ref 6–8.4)
RBC # BLD AUTO: 3.45 M/UL (ref 4–5.4)
SODIUM SERPL-SCNC: 133 MMOL/L (ref 136–145)
SODIUM SERPL-SCNC: 133 MMOL/L (ref 136–145)
WBC # BLD AUTO: 12.69 K/UL (ref 3.9–12.7)

## 2022-04-22 PROCEDURE — 63600175 PHARM REV CODE 636 W HCPCS: Performed by: HOSPITALIST

## 2022-04-22 PROCEDURE — 25000003 PHARM REV CODE 250: Performed by: STUDENT IN AN ORGANIZED HEALTH CARE EDUCATION/TRAINING PROGRAM

## 2022-04-22 PROCEDURE — 99233 PR SUBSEQUENT HOSPITAL CARE,LEVL III: ICD-10-PCS | Mod: ,,, | Performed by: INTERNAL MEDICINE

## 2022-04-22 PROCEDURE — 80053 COMPREHEN METABOLIC PANEL: CPT | Performed by: STUDENT IN AN ORGANIZED HEALTH CARE EDUCATION/TRAINING PROGRAM

## 2022-04-22 PROCEDURE — 94761 N-INVAS EAR/PLS OXIMETRY MLT: CPT

## 2022-04-22 PROCEDURE — 63600175 PHARM REV CODE 636 W HCPCS: Performed by: INTERNAL MEDICINE

## 2022-04-22 PROCEDURE — C9113 INJ PANTOPRAZOLE SODIUM, VIA: HCPCS | Performed by: HOSPITALIST

## 2022-04-22 PROCEDURE — 97116 GAIT TRAINING THERAPY: CPT

## 2022-04-22 PROCEDURE — 84100 ASSAY OF PHOSPHORUS: CPT | Performed by: STUDENT IN AN ORGANIZED HEALTH CARE EDUCATION/TRAINING PROGRAM

## 2022-04-22 PROCEDURE — 85025 COMPLETE CBC W/AUTO DIFF WBC: CPT | Performed by: STUDENT IN AN ORGANIZED HEALTH CARE EDUCATION/TRAINING PROGRAM

## 2022-04-22 PROCEDURE — 25000003 PHARM REV CODE 250: Performed by: HOSPITALIST

## 2022-04-22 PROCEDURE — 99233 SBSQ HOSP IP/OBS HIGH 50: CPT | Mod: ,,, | Performed by: INTERNAL MEDICINE

## 2022-04-22 PROCEDURE — 97161 PT EVAL LOW COMPLEX 20 MIN: CPT

## 2022-04-22 PROCEDURE — 99900035 HC TECH TIME PER 15 MIN (STAT)

## 2022-04-22 PROCEDURE — 83735 ASSAY OF MAGNESIUM: CPT | Performed by: STUDENT IN AN ORGANIZED HEALTH CARE EDUCATION/TRAINING PROGRAM

## 2022-04-22 PROCEDURE — 80048 BASIC METABOLIC PNL TOTAL CA: CPT | Mod: XB | Performed by: STUDENT IN AN ORGANIZED HEALTH CARE EDUCATION/TRAINING PROGRAM

## 2022-04-22 PROCEDURE — 94640 AIRWAY INHALATION TREATMENT: CPT

## 2022-04-22 RX ORDER — OXYCODONE HYDROCHLORIDE 5 MG/1
5 TABLET ORAL EVERY 4 HOURS PRN
Qty: 40 TABLET | Refills: 0 | Status: SHIPPED | OUTPATIENT
Start: 2022-04-22

## 2022-04-22 RX ORDER — LISINOPRIL 10 MG/1
10 TABLET ORAL DAILY
Status: DISCONTINUED | OUTPATIENT
Start: 2022-04-22 | End: 2022-04-22 | Stop reason: HOSPADM

## 2022-04-22 RX ORDER — LISINOPRIL 10 MG/1
10 TABLET ORAL DAILY
Qty: 90 TABLET | Refills: 3 | Status: SHIPPED | OUTPATIENT
Start: 2022-04-23 | End: 2023-04-23

## 2022-04-22 RX ADMIN — PANTOPRAZOLE SODIUM 40 MG: 40 INJECTION, POWDER, FOR SOLUTION INTRAVENOUS at 08:04

## 2022-04-22 RX ADMIN — CEFTRIAXONE 2 G: 2 INJECTION, SOLUTION INTRAVENOUS at 06:04

## 2022-04-22 RX ADMIN — HYDRALAZINE HYDROCHLORIDE 100 MG: 50 TABLET ORAL at 08:04

## 2022-04-22 RX ADMIN — LEVOTHYROXINE SODIUM 137 MCG: 25 TABLET ORAL at 06:04

## 2022-04-22 RX ADMIN — LISINOPRIL 10 MG: 10 TABLET ORAL at 08:04

## 2022-04-22 RX ADMIN — ISOSORBIDE MONONITRATE 20 MG: 20 TABLET ORAL at 08:04

## 2022-04-22 RX ADMIN — POLYETHYLENE GLYCOL 3350 17 G: 17 POWDER, FOR SOLUTION ORAL at 08:04

## 2022-04-22 RX ADMIN — ACETAMINOPHEN 1000 MG: 500 TABLET ORAL at 02:04

## 2022-04-22 RX ADMIN — ACETAMINOPHEN 1000 MG: 500 TABLET ORAL at 06:04

## 2022-04-22 NOTE — PT/OT/SLP EVAL
Physical Therapy Evaluation    Patient Name:  Erika Perera   MRN:  [id number]    Recommendations:     Discharge Recommendations:  nursing facility, skilled   Discharge Equipment Recommendations: none   Barriers to discharge: Decreased caregiver support (Due to her current LOF)    Assessment:     Erika Perera is a 82 y.o. female admitted with a medical diagnosis of Endophthalmitis.  She presents with the following impairments/functional limitations:  weakness, impaired endurance, impaired self care skills, impaired functional mobilty, gait instability, impaired balance, visual deficits, decreased upper extremity function, decreased lower extremity function .     Pt christina session well w/ good participation. PTA she was Tori w/ mobility but she is currently limited by the above listed deficits requiring Mod/Zaria for transfers and short distance gait. She is appropriate for acute PT services and will begin PT POC.    Rehab Prognosis: Good; patient would benefit from acute skilled PT services to address these deficits and reach maximum level of function.    Recent Surgery: Procedure(s) (LRB):  EVISCERATION, OCULAR CONTENTS (Left)  BLEPHARORRHAPHY (Left)  INJECTION, MEDICATION, RETROBULBAR (Left) 2 Days Post-Op    Plan:     During this hospitalization, patient to be seen 3 x/week to address the identified rehab impairments via gait training, therapeutic activities, therapeutic exercises, neuromuscular re-education and progress toward the following goals:    · Plan of Care Expires:  05/20/22    Subjective     Chief Complaint: weakness  Patient/Family Comments/goals: to get stronger  Pain/Comfort:  · Pain Rating 1: 0/10  · Pain Rating Post-Intervention 1: 0/10    Patients cultural, spiritual, Quaker conflicts given the current situation: no    Living Environment:  Pt lives w/ her son in a 1SH w/ 2 GERMAN and a LHR. She has a tub/shower combo.  Prior to admission, patients level of function was Tori w/ HH mobility  using a RW as needed. She would use a w/c in the community.  Equipment used at home: bedside commode, walker, rolling, wheelchair, shower chair.  DME owned (not currently used): none.  Upon discharge, patient will have assistance from her son but he works.    Objective:     Communicated with nursing prior to session.  Patient found supine with telemetry, pulse ox (continuous) (eye dressing)  upon PT entry to room.    General Precautions: Standard, fall   Orthopedic Precautions:N/A   Braces: N/A  Respiratory Status: Room air    Exams:  · Cognitive Exam:  Patient is oriented to Person, Place, Time and Situation  · Fine Motor Coordination:    · -       Intact  Left hand thumb/finger opposition skills and Right hand thumb/finger opposition skills  · Gross Motor Coordination:  WFL  · Postural Exam:  Patient presented with the following abnormalities:    · -       Rounded shoulders  · Sensation:    · -       Intact  light/touch hands/feet  · Skin Integrity/Edema:      · -       Skin integrity: Visible skin intact  · RUE ROM: WFL  · RUE Strength: grossly 4/5  · LUE ROM: WFL  · LUE Strength: grossly 4/5  · RLE ROM: WFL  · RLE Strength: grossly 4/5  · LLE ROM: WFL  · LLE Strength: grossly 4/5    Functional Mobility:  · Bed Mobility:    · Supine>sit on bed w/ SBA  · Cues and inc'd time required  · Transfers:   · Sit>stand from EOB w/ RW And ModA to rise  · Stand pivot to bedside chair w/ RW and Zaria to pivot and control descent  · Cues for hand placement and general safety  · Gait:   · 10ft w/ RW and Zaria for stability  · Cues for posture and to remain inside RW   · Limited in distance by fatigue/weakness  · Balance:   · Static stand w/ RW and Min/CGA    Therapeutic Activities and Exercises:   Pt was educated on PT role and POC. She verb understanding.     AM-PAC 6 CLICK MOBILITY  Total Score:13     Patient left up in chair with all lines intact and call button in reach.    GOALS:   Multidisciplinary Problems     Physical  Therapy Goals        Problem: Physical Therapy    Goal Priority Disciplines Outcome Goal Variances Interventions   Physical Therapy Goal     PT, PT/OT Ongoing, Progressing     Description: Goals to be met by: 22     Patient will increase functional independence with mobility by performin. Sit to stand transfer with Contact Guard Assistance  2. Bed to chair transfer with Contact Guard Assistance using Rolling Walker  3. Gait  x 50 feet with Stand-by Assistance using Rolling Walker.                      History:     Past Medical History:   Diagnosis Date    DM2 (diabetes mellitus, type 2) 2022    HTN (hypertension) 2022       Past Surgical History:   Procedure Laterality Date    CATARACT EXTRACTION Bilateral     EVISCERATION OF OCULAR CONTENTS Left 2022    Procedure: EVISCERATION, OCULAR CONTENTS;  Surgeon: Dorcas Reyna MD;  Location: [facility] OR [address] [place] [address];  Service: Ophthalmology;  Laterality: Left;    RETROBULBAR INJECTION OF MEDICATION Left 2022    Procedure: INJECTION, MEDICATION, RETROBULBAR;  Surgeon: Dorcas Reyna MD;  Location: [facility] OR [address] [place] [address];  Service: Ophthalmology;  Laterality: Left;    RETROBULBAR INJECTION OF MEDICATION  2022    Procedure: ;  Surgeon: Dorcas Reyna MD;  Location: [facility] OR [address] [place] [address];  Service: Ophthalmology;;    TARSORRHAPHY Left 2022    Procedure: BLEPHARORRHAPHY;  Surgeon: Dorcas Reyna MD;  Location: [address];  Service: Ophthalmology;  Laterality: Left;       Time Tracking:     PT Received On: 22  PT Start Time: 1014     PT Stop Time: 1036  PT Total Time (min): 22 min     Billable Minutes: Evaluation 12, Gait Training 10 and Total Time 2022

## 2022-04-22 NOTE — PROGRESS NOTES
[facility] ([place] [address])  Infectious Disease  Progress Note    Patient Name: Erika Perera  MRN: [id number]  Admission Date: 4/14/2022  Length of Stay: 8 days  Attending Physician: Nba Carmichael MD  Primary Care Provider: Minor Bennett MD    Isolation Status: No active isolations  Assessment/Plan:      * Endophthalmitis  83 y/o female with a hx of ESRD on HD MWF vis AV LUE, CAD, HF, HTN, DM2, and recent admission to OSH 4/5-4/8 for Klebsiella pneumoniae bacteremia thought to be 2/2 UTI. Culture results show pansensitive klebsiella, and the patient was discharged on PO ciprofloxacin. Now with eye swelling and vision changes for 1-2 weeks.  Seen by ophthalmology with concern for endophthalmitis - CT orbit with minimal asymmetric enhancement at the periphery of the left globe compared to the right could represent a mild inflammatory or infectious process. Due to lack of trauma and wounds to the eye, expect hematogenous.Now s/p implant and     Bcx 4/14 NGTD    Recommendations:  - continue ceftriaxone 2 g IV q 12 x 2 weeks  - see OPAT note   - at f/u, will discuss po options for osteomyelitis        Minor Silva MD  Infectious Disease    Subjective:     Principal Problem:Endophthalmitis    HPI: Erika Perera is an 83 y/o female with a hx of ESRD on HD MWF vis AV LUE, CAD, HF, HTN, DM2, and HLD who presents to the ED from ophthalmology clinic for L eye pain and swelling x 1 week.     Patient was recently admitted to OSH 4/5-4/8 for Klebsiella pneumoniae bacteremia thought to be 2/2 UTI. Culture results show pansensitive klebsiella, and the patient was discharged on PO ciprofloxacin and reports compliance with the regimen. She was Seen in optometry clinic and was referred to ophthalmology clinic 04/14 over concerns for endophthalmitis. She was then sent to the ED for CT orbits and admit for likely need for enucleation and evisceration due to the severity of the disease.    She has been  having worsening vision and eye swelling for 1 week, denies any fever chills, N/V abdominal pain or diarrhea. She denies any eye truama.      Interval History: Events noted. Pain resolved. No complaints.    Review of Systems   Constitutional:  Negative for fever.   Eyes:  Negative for pain.   All other systems reviewed and are negative.  Objective:     Vital Signs (Most Recent):  Temp: 98.7 °F (37.1 °C) (04/22/22 0803)  Pulse: (!) 58 (04/22/22 0808)  Resp: 20 (04/22/22 0803)  BP: (!) 129/58 (04/22/22 0808)  SpO2: 97 % (04/22/22 1019)   Vital Signs (24h Range):  Temp:  [98.1 °F (36.7 °C)-98.7 °F (37.1 °C)] 98.7 °F (37.1 °C)  Pulse:  [55-68] 58  Resp:  [18-27] 20  SpO2:  [94 %-100 %] 97 %  BP: (129-176)/(56-72) 129/58     Weight: 81.6 kg (180 lb)  Body mass index is 29.95 kg/m².    Estimated Creatinine Clearance: 8.2 mL/min (A) (based on SCr of 5.6 mg/dL (H)).    Physical Exam  Vitals and nursing note reviewed.   Constitutional:       Appearance: Normal appearance.   HENT:      Head: Normocephalic and atraumatic.   Cardiovascular:      Rate and Rhythm: Normal rate.   Neurological:      General: No focal deficit present.      Mental Status: She is alert and oriented to person, place, and time. Mental status is at baseline.   Psychiatric:         Mood and Affect: Mood normal.         Behavior: Behavior normal.         Thought Content: Thought content normal.         Judgment: Judgment normal.       Significant Labs: Blood Culture:   Recent Labs   Lab 04/14/22  1728 04/14/22  1729   LABBLOO No growth after 5 days. No growth after 5 days.     CBC:   Recent Labs   Lab 04/21/22 0327   WBC 12.98*   HGB 10.0*   HCT 31.8*        CMP:   Recent Labs   Lab 04/21/22 0327   *   K 4.5   CL 93*   CO2 23   GLU 93   BUN 44*   CREATININE 5.6*   CALCIUM 9.1   PROT 6.4   ALBUMIN 2.1*   BILITOT 0.9   ALKPHOS 99   AST 21   ALT <5*   ANIONGAP 16   EGFRNONAA 6.6*     Wound Culture:   Recent Labs   Lab 04/16/22  111  04/20/22  1847 04/20/22  1913   LABAERO No growth No growth No growth       Significant Imaging: I have reviewed all pertinent imaging results/findings within the past 24 hours.

## 2022-04-22 NOTE — PLAN OF CARE
[facility] educator met with Vincent Perera  son [phone] discussed discharge plan for home IVABX. Erika Perera will dc home with family support. Son will infuse medication via Elastomeric Pump. son educated on S.A.S.H procedure. S.A.S.H mat provided.  Patient education checklist reviewed and acknowledged by above person(s) above and are agreeable to discharge with home infusion plan of care. IV administration process using aspetic technique was reviewed with successful return demonstration. Son feels comfortable with infusion. Patient will dc home with ceftriaxone 2 GM IV q 12  hours for estimated end of therapy date 5/5/22. Dosing schedule times will be 12 am and 12 pm due to dialysis schedule. Son called dialysis center to see if they would help on administration but was advised to give after dialysis. He is aware patient will go to dialysis tomorrow. No extensions needed   [facility] will follow patient  for weekly dressing changes and lab draws. Time allotted for questions. Patients nurse and case management team notified teaching has been completed.     Medication delivery will be made to home    Patient accepted to care by Shweta LÓPEZ. Report Maddi Mejia.         [facility]  Lilia Lau Rn, Clinical Educator  Cell [phone]  Office [phone]  Fax [phone]

## 2022-04-22 NOTE — DISCHARGE SUMMARY
[facility] - Intensive Care ([place] [id number])  [facility]  Discharge Summary      Patient Name: Erika Perera  MRN: [id number]  Patient Class: IP- Inpatient  Admission Date: 4/14/2022  Hospital Length of Stay: 8 days  Discharge Date and Time:  04/22/2022 11:36 AM  Attending Physician: Nba Carmichael MD   Discharging Provider: Nba Carmichael MD  Primary Care Provider: Minor Bennett MD  [facility] Team: [facility] Nba Carmichael MD    HPI:   81 yo F with PMHx ESRD, CAD, chronic diastolic heart failure, HTN, DM2, and HLD who presents to the ED from ophthalmology clinic for L eye pain and swelling x 1 week. Pt. Reports that she has had progressively worsening L eye pain and vision loss over the course of the last week. Pt. Reports developing floaters and slowly worsening vision loss with swelling of her upper and lower eyelids. Pt. States that she has developed worsening pain in addition to the swelling over the last couple of days which prompted her to schedule an appointment with optometry. Pt. Seen in optometry clinic and was referred to ophthalmology clinic today over concerns for endophthalmitis. She was then sent to the ED for CT orbits and admit for likely need for enucleation and evisceration due to the severity of the disease. Of note, patient was recently admitted to [facility] 4/5-/48 for Klebsiella pneumoniae bacteremia thought to be 2/2 UTI. Culture results show pansensitive klebsiella, and the patient was discharged on PO ciprofloxacin and reports compliance with the regimen. Pt. Denies any fevers, chills, eye discharge, or redness.      Procedure(s) (LRB):  EVISCERATION, OCULAR CONTENTS (Left)  BLEPHARORRHAPHY (Left)  INJECTION, MEDICATION, RETROBULBAR (Left)      Hospital Course:   Patient admitted to hospital service.  Ophthalmology, ID, nephrology consulted.  Started on ceftriaxone. Blood cx collected.  CT scan demonstrated bilateral exophthalmos and inflammatory changes involving left  globe.  During admission, patient noted hypoglycemic.  She has had issues at home with the same.  She takes sulfonylurea at home and is a HD patient.  Started on D10gtt, SQ octreotide, glucagon, regular renal diet, q2hr accuchecks.  Also noted to be anemic on admission thought to be secondary to ESRD and acute infection.  Nurse noted maroon stools.  Patient received total of 3U PRBC.  GI consulted and recommended outpatient colonoscopy as well as stool softner and increase fiber diet.  Placed on IV PPI with serial Hb.  Stopped asa and ticagrelor.  Patient noted to have new right first toe ulcer.  Xray indicated osteomyelitis.  Podiatry consulted and performed bedside debridement.  Specimen sent to micro and path.  Incidental adrenal mass found on abdominal US.  Follow up with CT scan recommended at some point in the future. Patient to OR on Wed 4/20 for surgery to left eye. Awaiting to see if eye culture from yesterday 4/20 reveals anything, likely d/c tomorrow with IV ceftriaxone.      HA much better controlled with BP controlled. BP meds changed for discharge.   Will deliver to bedside.   F/u requested for PCP, Neph, Ophth, and ID  2 more weeks IV ceft 2 q q12h.     Review of Systems   Constitutional:  Positive for appetite change. Negative for fever.   Eyes:  Positive for photophobia, pain and visual disturbance.   Respiratory:  Negative for cough and shortness of breath.    Cardiovascular:  Negative for chest pain and leg swelling.   Gastrointestinal:  Negative for abdominal pain, blood in stool, nausea and vomiting.   Genitourinary:         Anuric    Musculoskeletal:  Negative for back pain and myalgias.   Neurological:  Positive for headaches.       Objective:      Vitals:    04/22/22 0808 04/22/22 0823 04/22/22 1019 04/22/22 1100   BP: (!) 129/58      BP Location:       Patient Position:       Pulse: (!) 58   62   Resp:       Temp:       TempSrc:       SpO2:  97% 97% 95%   Weight:       Height:                Physical Exam  Constitutional:       Appearance: Normal appearance.   HENT:      Head: Normocephalic and atraumatic.   Eyes:      General:         Left eye: Discharge present.     Conjunctiva/sclera:      Left eye: Exudate present.   Cardiovascular:      Rate and Rhythm: Normal rate and regular rhythm.   Pulmonary:      Effort: Pulmonary effort is normal.      Breath sounds: Normal breath sounds.   Abdominal:      General: Bowel sounds are normal.      Palpations: Abdomen is soft.   Musculoskeletal:         General: No deformity.      Right lower leg: No edema.      Left lower leg: No edema.   Skin:     General: Skin is warm and dry.   Neurological:      General: No focal deficit present.      Mental Status: She is alert and oriented to person, place, and time.   Psychiatric:         Mood and Affect: Mood normal.         Behavior: Behavior normal.          Goals of Care Treatment Preferences:  Code Status: Full Code      Consults:   Consults (From admission, onward)        Status Ordering Provider     Inpatient consult to Midline team  Once        Provider:  (Not yet assigned)    Completed WAYNE SAM     Inpatient consult to Podiatry  Once        Provider:  William Baron DPM    Completed WAYNE SAM     Inpatient consult to Gastroenterology  Once        Provider:  Myrna Day MD    Completed WAYNE SAM     Inpatient consult to Endocrinology  Once        Provider:  (Not yet assigned)    Completed LIAM TAY     Inpatient consult to Infectious Diseases  Once        Provider:  Fareed Valladares MD    Completed WAYNE SAM     Inpatient consult to Nephrology  Once        Provider:  (Not yet assigned)    Completed KWAN RUDD     Inpatient consult to Ophthalmology  Once        Provider:  (Not yet assigned)    Completed KWAN RUDD          No new Assessment & Plan notes have been filed under this hospital service since the last note was generated.  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 Go on 5/3/2022.    Specialty: Family Medicine  Why: Hospital follow up, at 9:00AM. Bring discharge paperwork.  Contact information:  [address] [id number] [phone]             ROZ Moreira. Go on 4/26/2022.    Why: Cardiology Appt., at 3:45 PM.  Contact information:  [address] [place] [address]. [address]  [place] [address]  [phone]           [facility] Follow up.    Specialty: Home Health Services  Why: For Home Health Services. Please contact [facility]  if they have not contacted you within 24 hours of discharge.  Contact information:  [address] [facility] [place] [address] [phone]             [facility]. Go on 4/25/2022.    Why: Dialysis chair time, Monday, at 7:20AM. Call [phone], if needed.  Contact information:  [address]  [address] [place] [id number] [phone]                     Patient Instructions:      Ambulatory referral/consult to Internal Medicine   Standing Status: Future   Referral Priority: Routine  Referral Type: Consultation   Referral Reason: Specialty Services Required   Requested Specialty: Internal Medicine   Number of Visits Requested: 1     Ambulatory referral/consult to Ophthalmology   Standing Status: Future   Referral Priority: Urgent Referral Type: Consultation   Referral Reason: Specialty Services Required   Requested Specialty: Ophthalmology     Ambulatory referral/consult to Infectious Disease   Standing Status: Future   Referral Priority: Routine Referral Type: Consultation   Referral Reason: Specialty Services Required   Requested Specialty: Infectious Diseases   Number of Visits Requested: 1       Significant Diagnostic Studies:       Recent Results (from the past 100 hour(s))   Echo    Collection Time: 04/18/22  8:02 AM   Result Value Ref Range    BSA 1.93 m2    TDI SEPTAL 0.06 m/s    LV LATERAL E/E' RATIO 21.17 m/s    LV SEPTAL E/E' RATIO 21.17 m/s    LA WIDTH 4.62 cm    Right Atrial Pressure (from IVC) 15 mmHg    EF 60 %    TDI LATERAL 0.06 m/s    LVIDd 4.15 3.5 - 6.0 cm    IVS 0.90 0.6 - 1.1 cm    Posterior Wall 0.90 0.6 - 1.1 cm    LVIDs 2.99 2.1 - 4.0 cm    FS 28 28 - 44 %    LA volume 99.96 cm3    Sinus 2.90 cm    STJ 2.75 cm    Ascending aorta 2.43 cm    LV mass 116.39 g    LA size 4.10 cm    RVDD 4.24 cm    TAPSE 1.28 cm    Left Ventricle Relative Wall Thickness 0.43 cm    AV mean gradient 4 mmHg    AV valve area 2.28 cm2    AV Velocity Ratio 0.67     AV index (prosthetic) 0.69     MV mean gradient 1 mmHg    MV valve area p 1/2 method 3.36 cm2    MV valve area by continuity eq 1.74 cm2    E/A ratio 1.87     Mean e' 0.06 m/s    E wave deceleration time 225.47 msec    LVOT diameter 2.05 cm    LVOT area 3.3 cm2    LVOT peak iliana 0.91 m/s    LVOT peak VTI 22.60 cm    Ao peak iliana 1.36 m/s    Ao VTI 32.73 cm    Mr max iliana 0.05 m/s    LVOT stroke volume 74.56 cm3    AV peak gradient 7 mmHg    MV peak gradient 9 mmHg    TV rest pulmonary artery pressure 65 mmHg    E/E' ratio 21.17 m/s    MV  Peak E Don 1.27 m/s    TR Max Don 3.55 m/s    MV VTI 42.82 cm    MV stenosis pressure 1/2 time 65.39 ms    MV Peak A Don 0.68 m/s    LV Systolic Volume 34.84 mL    LV Systolic Volume Index 18.4 mL/m2    LV Diastolic Volume 76.59 mL    LV Diastolic Volume Index 40.52 mL/m2    LA Volume Index 52.9 mL/m2    LV Mass Index 62 g/m2    RA Major Axis 5.76 cm    Left Atrium Minor Axis 6.31 cm    Left Atrium Major Axis 6.11 cm    Triscuspid Valve Regurgitation Peak Gradient 50 mmHg    LA Volume Index (Mod) 35.5 mL/m2    LA volume (mod) 67.06 cm3    RA Width 4.11 cm   POCT glucose    Collection Time: 04/18/22  8:17 AM   Result Value Ref Range    POCT Glucose 90 70 - 110 mg/dL   POCT glucose    Collection Time: 04/18/22 11:33 AM   Result Value Ref Range    POCT Glucose 141 (H) 70 - 110 mg/dL   POCT glucose    Collection Time: 04/18/22 12:59 PM   Result Value Ref Range    POCT Glucose 146 (H) 70 - 110 mg/dL   POCT glucose    Collection Time: 04/18/22  3:44 PM   Result Value Ref Range    POCT Glucose 102 70 - 110 mg/dL   POCT glucose    Collection Time: 04/18/22 11:33 PM   Result Value Ref Range    POCT Glucose 105 70 - 110 mg/dL   Basic Metabolic Panel    Collection Time: 04/19/22  4:16 AM   Result Value Ref Range    Sodium 131 (L) 136 - 145 mmol/L    Potassium 4.9 3.5 - 5.1 mmol/L    Chloride 95 95 - 110 mmol/L    CO2 20 (L) 23 - 29 mmol/L    Glucose 116 (H) 70 - 110 mg/dL    BUN 55 (H) 8 - 23 mg/dL    Creatinine 6.2 (H) 0.5 - 1.4 mg/dL    Calcium 8.6 (L) 8.7 - 10.5 mg/dL    Anion Gap 16 8 - 16 mmol/L    eGFR if African American 6.7 (A) >60 mL/min/1.73 m^2    eGFR if non African American 5.8 (A) >60 mL/min/1.73 m^2   CBC Without Differential    Collection Time: 04/19/22  4:16 AM   Result Value Ref Range    WBC 10.87 3.90 - 12.70 K/uL    RBC 3.26 (L) 4.00 - 5.40 M/uL    Hemoglobin 9.6 (L) 12.0 - 16.0 g/dL    Hematocrit 31.3 (L) 37.0 - 48.5 %    MCV 96 82 - 98 fL    MCH 29.4 27.0 - 31.0 pg    MCHC 30.7 (L) 32.0 - 36.0 g/dL    RDW  14.6 (H) 11.5 - 14.5 %    Platelets 266 150 - 450 K/uL    MPV 9.3 9.2 - 12.9 fL   POCT glucose    Collection Time: 04/19/22  5:39 AM   Result Value Ref Range    POCT Glucose 121 (H) 70 - 110 mg/dL   POCT glucose    Collection Time: 04/19/22  7:37 AM   Result Value Ref Range    POCT Glucose 113 (H) 70 - 110 mg/dL   Hepatitis panel, acute    Collection Time: 04/19/22  7:59 AM   Result Value Ref Range    Hepatitis B Surface Ag Negative Negative    Hep B C IgM Negative Negative    Hep A IgM Negative Negative    Hepatitis C Ab Negative Negative   POCT glucose    Collection Time: 04/19/22 12:06 PM   Result Value Ref Range    POCT Glucose 96 70 - 110 mg/dL   POCT glucose    Collection Time: 04/19/22  3:28 PM   Result Value Ref Range    POCT Glucose 160 (H) 70 - 110 mg/dL   POCT glucose    Collection Time: 04/19/22  7:26 PM   Result Value Ref Range    POCT Glucose 206 (H) 70 - 110 mg/dL   Basic Metabolic Panel    Collection Time: 04/20/22  3:49 AM   Result Value Ref Range    Sodium 135 (L) 136 - 145 mmol/L    Potassium 4.1 3.5 - 5.1 mmol/L    Chloride 97 95 - 110 mmol/L    CO2 26 23 - 29 mmol/L    Glucose 95 70 - 110 mg/dL    BUN 36 (H) 8 - 23 mg/dL    Creatinine 4.7 (H) 0.5 - 1.4 mg/dL    Calcium 8.4 (L) 8.7 - 10.5 mg/dL    Anion Gap 12 8 - 16 mmol/L    eGFR if African American 9.3 (A) >60 mL/min/1.73 m^2    eGFR if non  8.1 (A) >60 mL/min/1.73 m^2   CBC Auto Differential    Collection Time: 04/20/22  3:49 AM   Result Value Ref Range    WBC 9.26 3.90 - 12.70 K/uL    RBC 3.36 (L) 4.00 - 5.40 M/uL    Hemoglobin 9.9 (L) 12.0 - 16.0 g/dL    Hematocrit 31.2 (L) 37.0 - 48.5 %    MCV 93 82 - 98 fL    MCH 29.5 27.0 - 31.0 pg    MCHC 31.7 (L) 32.0 - 36.0 g/dL    RDW 14.4 11.5 - 14.5 %    Platelets 268 150 - 450 K/uL    MPV 9.1 (L) 9.2 - 12.9 fL    Immature Granulocytes 0.5 0.0 - 0.5 %    Gran # (ANC) 7.7 1.8 - 7.7 K/uL    Immature Grans (Abs) 0.05 (H) 0.00 - 0.04 K/uL    Lymph # 0.9 (L) 1.0 - 4.8 K/uL    Mono # 0.5  0.3 - 1.0 K/uL    Eos # 0.1 0.0 - 0.5 K/uL    Baso # 0.02 0.00 - 0.20 K/uL    nRBC 0 0 /100 WBC    Gran % 83.6 (H) 38.0 - 73.0 %    Lymph % 9.4 (L) 18.0 - 48.0 %    Mono % 5.7 4.0 - 15.0 %    Eosinophil % 0.6 0.0 - 8.0 %    Basophil % 0.2 0.0 - 1.9 %    Differential Method Automated    POCT glucose    Collection Time: 04/20/22  4:18 AM   Result Value Ref Range    POCT Glucose 110 70 - 110 mg/dL   POCT glucose    Collection Time: 04/20/22  7:54 AM   Result Value Ref Range    POCT Glucose 101 70 - 110 mg/dL   POCT glucose    Collection Time: 04/20/22 11:47 AM   Result Value Ref Range    POCT Glucose 88 70 - 110 mg/dL   POCT glucose    Collection Time: 04/20/22  4:19 PM   Result Value Ref Range    POCT Glucose 89 70 - 110 mg/dL   Culture, Anaerobe    Collection Time: 04/20/22  6:47 PM    Specimen: Eyelid, Left; Wound   Result Value Ref Range    Anaerobic Culture Culture in progress    Aerobic culture    Collection Time: 04/20/22  6:47 PM    Specimen: Eyelid, Left; Wound   Result Value Ref Range    Aerobic Bacterial Culture No growth    Gram stain    Collection Time: 04/20/22  6:47 PM    Specimen: Eyelid, Left; Wound   Result Value Ref Range    Gram Stain Result Rare WBC's     Gram Stain Result No organisms seen    Culture, Anaerobe    Collection Time: 04/20/22  7:13 PM    Specimen: Eyelid, Left; Wound   Result Value Ref Range    Anaerobic Culture Culture in progress    Aerobic culture    Collection Time: 04/20/22  7:13 PM    Specimen: Eyelid, Left; Wound   Result Value Ref Range    Aerobic Bacterial Culture No growth    Gram stain    Collection Time: 04/20/22  7:13 PM    Specimen: Eyelid, Left; Wound   Result Value Ref Range    Gram Stain Result Rare WBC's     Gram Stain Result No organisms seen    POCT glucose    Collection Time: 04/20/22  9:18 PM   Result Value Ref Range    POCT Glucose 129 (H) 70 - 110 mg/dL   Basic Metabolic Panel    Collection Time: 04/21/22  3:27 AM   Result Value Ref Range    Sodium 132 (L) 136  - 145 mmol/L    Potassium 4.5 3.5 - 5.1 mmol/L    Chloride 93 (L) 95 - 110 mmol/L    CO2 23 23 - 29 mmol/L    Glucose 93 70 - 110 mg/dL    BUN 44 (H) 8 - 23 mg/dL    Creatinine 5.6 (H) 0.5 - 1.4 mg/dL    Calcium 9.1 8.7 - 10.5 mg/dL    Anion Gap 16 8 - 16 mmol/L    eGFR if African American 7.6 (A) >60 mL/min/1.73 m^2    eGFR if non African American 6.6 (A) >60 mL/min/1.73 m^2   CBC Without Differential    Collection Time: 04/21/22  3:27 AM   Result Value Ref Range    WBC 12.98 (H) 3.90 - 12.70 K/uL    RBC 3.34 (L) 4.00 - 5.40 M/uL    Hemoglobin 10.0 (L) 12.0 - 16.0 g/dL    Hematocrit 31.8 (L) 37.0 - 48.5 %    MCV 95 82 - 98 fL    MCH 29.9 27.0 - 31.0 pg    MCHC 31.4 (L) 32.0 - 36.0 g/dL    RDW 14.5 11.5 - 14.5 %    Platelets 285 150 - 450 K/uL    MPV 9.0 (L) 9.2 - 12.9 fL   Hepatic function panel    Collection Time: 04/21/22  3:27 AM   Result Value Ref Range    Total Protein 6.4 6.0 - 8.4 g/dL    Albumin 2.1 (L) 3.5 - 5.2 g/dL    Total Bilirubin 0.9 0.1 - 1.0 mg/dL    Bilirubin, Direct 0.6 (H) 0.1 - 0.3 mg/dL    AST 21 10 - 40 U/L    ALT <5 (L) 10 - 44 U/L    Alkaline Phosphatase 99 55 - 135 U/L   POCT glucose    Collection Time: 04/21/22  7:30 AM   Result Value Ref Range    POCT Glucose 114 (H) 70 - 110 mg/dL   POCT glucose    Collection Time: 04/21/22  6:06 PM   Result Value Ref Range    POCT Glucose 140 (H) 70 - 110 mg/dL   POCT glucose    Collection Time: 04/21/22  8:42 PM   Result Value Ref Range    POCT Glucose 186 (H) 70 - 110 mg/dL   POCT glucose    Collection Time: 04/21/22 11:33 PM   Result Value Ref Range    POCT Glucose 137 (H) 70 - 110 mg/dL   POCT glucose    Collection Time: 04/22/22  3:51 AM   Result Value Ref Range    POCT Glucose 101 70 - 110 mg/dL   POCT glucose    Collection Time: 04/22/22  8:01 AM   Result Value Ref Range    POCT Glucose 120 (H) 70 - 110 mg/dL       Microbiology Results (last 7 days)     Procedure Component Value Units Date/Time    Culture, Anaerobe [[id number]] Collected:  04/20/22 1847    Order Status: Completed Specimen: Wound from Eyelid, Left Updated: 04/22/22 1033     Anaerobic Culture Culture in progress    Narrative:      Left eye    Culture, Anaerobe [[id number]] Collected: 04/20/22 1913    Order Status: Completed Specimen: Wound from Eyelid, Left Updated: 04/22/22 1033     Anaerobic Culture Culture in progress    Narrative:      Venturous left eye    Aerobic culture [[id number]] Collected: 04/20/22 1913    Order Status: Completed Specimen: Wound from Eyelid, Left Updated: 04/22/22 0546     Aerobic Bacterial Culture No growth    Narrative:      Venturous left eye    Aerobic culture [[id number]] Collected: 04/20/22 1847    Order Status: Completed Specimen: Wound from Eyelid, Left Updated: 04/22/22 0546     Aerobic Bacterial Culture No growth    Narrative:      Left eye    Culture, Anaerobe [[id number]] Collected: 04/16/22 1112    Order Status: Completed Specimen: Bone from Toe, Right Foot Updated: 04/21/22 1030     Anaerobic Culture No anaerobes isolated    Gram stain [[id number]] Collected: 04/20/22 1847    Order Status: Completed Specimen: Wound from Eyelid, Left Updated: 04/21/22 0205     Gram Stain Result Rare WBC's      No organisms seen    Narrative:      Left eye    Gram stain [[id number]] Collected: 04/20/22 1913    Order Status: Completed Specimen: Wound from Eyelid, Left Updated: 04/21/22 0010     Gram Stain Result Rare WBC's      No organisms seen    Narrative:      Venturous left eye    Blood culture #1 **CANNOT BE ORDERED STAT** [[id number]] Collected: 04/14/22 1728    Order Status: Completed Specimen: Blood from Peripheral, Forearm, Right Updated: 04/19/22 2012     Blood Culture, Routine No growth after 5 days.    Blood culture #2 **CANNOT BE ORDERED STAT** [[id number]] Collected: 04/14/22 1729    Order Status: Completed Specimen: Blood from Peripheral, Forearm, Right Updated: 04/19/22 2012     Blood Culture, Routine No growth after 5 days.    Aerobic culture [[id number]]  Collected: 04/16/22 1112    Order Status: Completed Specimen: Bone from Toe, Right Foot Updated: 04/19/22 0848     Aerobic Bacterial Culture No growth          Imaging Results          X-Ray Chest 1 View (Final result)  Result time 04/14/22 21:01:21    Final result by Hong Hutton MD (04/14/22 21:01:21)                 Impression:      Suboptimal inspiration with probable mild cardiomegaly and mild bilateral infiltrates.  Findings could represent mild edema or pneumonitis.  Follow-up recommended.      Electronically signed by: Hong Hutton  Date:    04/14/2022  Time:    21:01             Narrative:    EXAMINATION:  XR CHEST 1 VIEW    CLINICAL HISTORY:  Septic work-up;    TECHNIQUE:  Single frontal view of the chest was performed.    COMPARISON:  None    FINDINGS:  Cardiac silhouette is minimally enlarged.    Suboptimal inspiration.    Mild bilateral interstitial and mild patchy infiltrates.    Findings could represent mild edema or pneumonitis.  Follow-up recommended.    No mass or consolidation is identified.  No effusion or pneumothorax.  No acute osseous abnormality.                               CT Orbits Sella Post Fossa With Contrast (Final result)  Result time 04/14/22 19:10:06    Final result by Hong Hutton MD (04/14/22 19:10:06)                 Impression:      Bilateral exophthalmos.  No orbital lesion causing mass effect.  Minimal asymmetric enhancement at the periphery of the left globe compared to the right could represent a mild inflammatory or infectious process.  Follow-up recommended.    Electronically signed by resident: Rodrigue Eddy  Date:    04/14/2022  Time:    18:49    Electronically signed by: Hong Hutton  Date:    04/14/2022  Time:    19:10             Narrative:    EXAMINATION:  CT ORBITS SELLA POST FOSSA WITH CONTRAST    CLINICAL HISTORY:  Ocular pain;Exophthalmos;hypopyon, concern for orbital cellulitis.;.    COMPARISON:  None.    TECHNIQUE:  Contiguous axial images of the  orbits were obtained with the administration of intravenous contrast. Coronal and sagittal reconstructions were performed.  Dose given 75 cc Omnipaque 350 intravenous contrast.    FINDINGS:  There is no fracture identified of the orbital walls or floor.    The extraocular muscles and optic nerves appear symmetrical. Pre-septal soft tissues are within normal limits. Optic nerves and complex show no abnormalities. Lacrimal apparatus is unremarkable.    The maxillofacial skeleton is intact. No bony abnormality is identified of the zygoma, maxilla, or mandible. The pterygoid plates are intact bilaterally.    The globes are bilaterally symmetric in size demonstrating exophthalmos.  No orbital lesion.  There is minimal asymmetric enhancement at the periphery of the left globe compared to the right could represent a mild inflammatory or infectious process.  Recommend ophthalmology follow-up.  No focal abnormality.    Extraocular muscles appear within normal limits.    The paranasal sinuses are well pneumatized. No fluid is identified within the paranasal cavities. The nasal septum approximates the midline.    There is no nasal fracture. The nasopharynx and oropharynx are normal in appearance.    No soft tissue swelling or defect is identified.    Other findings:    Brain:  Normal.    Cavernous sinuses: Unremarkable                               CT Head Without Contrast (Final result)  Result time 04/14/22 19:11:54    Final result by Hong Hutton MD (04/14/22 19:11:54)                 Impression:      No evidence of acute intracranial pathology.    Involutional changes with chronic microvascular ischemic changes.    Electronically signed by resident: Rodrigue Eddy  Date:    04/14/2022  Time:    18:41    Electronically signed by: Hong Hutton  Date:    04/14/2022  Time:    19:11             Narrative:    EXAMINATION:  CT HEAD WITHOUT CONTRAST    CLINICAL HISTORY:  Headache, new or worsening (Age >= 50y);L eye  endophyalmitis, L eye swelling and exophthalmos.;    TECHNIQUE:  Low dose axial CT images obtained throughout the head without the use of intravenous contrast.  Axial, sagittal and coronal reconstructions were performed.    COMPARISON:  None    FINDINGS:  Intracranial compartment:    No new extra-axial blood or fluid collections are identified.    Moderate involutional changes and chronic microvascular ischemic changes in the periventricular white matter..  Bilateral calcifications of the basal ganglia.  No new parenchymal mass, hemorrhage, edema, or major vascular distribution infarct.    Ventricles are normal in size without evidence of hydrocephalus.    Skull/extracranial compartment:    No displaced calvarial fracture.    Mastoid air cells and paranasal sinuses are essentially clear.    Bilateral exophthalmos.  See CT orbits report same date.                                    Pending Diagnostic Studies:     Procedure Component Value Units Date/Time    Proinsulin [[id number]] Collected: 04/15/22 2326    Order Status: Sent Lab Status: In process Updated: 04/15/22 2348    Specimen: Blood     Specimen to Pathology, Surgery ENT [[id number]] Collected: 04/20/22 1927    Order Status: Sent Lab Status: In process Updated: 04/21/22 1036    Specimen: Tissue          Medications:  Reconciled Home Medications:      Medication List      START taking these medications    cefTRIAXone 2 g/50 mL Pgbk IVPB  Commonly known as: ROCEPHIN  Inject 50 mLs (2 g total) into the vein every 12 (twelve) hours. for 14 days     lisinopriL 10 MG tablet  Take 1 tablet (10 mg total) by mouth once daily.  Start taking on: April 23, 2022     oxyCODONE 5 MG immediate release tablet  Commonly known as: ROXICODONE  Take 1 tablet (5 mg total) by mouth every 4 (four) hours as needed for Pain.        CHANGE how you take these medications    hydrALAZINE 100 MG tablet  Commonly known as: APRESOLINE  Take 1 tablet by mouth 2 (two) times daily.  What changed:  Another medication with the same name was removed. Continue taking this medication, and follow the directions you see here.        CONTINUE taking these medications    albuterol 90 mcg/actuation inhaler  Commonly known as: PROVENTIL/VENTOLIN HFA  Inhale 2 puffs by mouth every 4  to 6 hours as needed     aspirin 81 MG EC tablet  Commonly known as: ECOTRIN  Take 81 mg by mouth once daily.     blood sugar diagnostic Strp  by Misc.(Non-Drug; Combo Route) route     budesonide-formoterol 160-4.5 mcg 160-4.5 mcg/actuation Hfaa  Commonly known as: SYMBICORT  Inhale 2 puffs into the lungs every 12 (twelve) hours. Controller     calcitRIOL 0.25 MCG Cap  Commonly known as: ROCALTROL  Take 1 capsule by mouth once daily.     carvediloL 3.125 MG tablet  Commonly known as: COREG  Take 3.125 mg by mouth 2 (two) times daily with meals.     glimepiride 4 MG tablet  Commonly known as: AMARYL  Take 1 tablet by mouth every morning.     isosorbide mononitrate 20 MG Tab  Commonly known as: ISMO,MONOKET  Take 1 tablet by mouth 2 (two) times daily.     levothyroxine 150 MCG tablet  Commonly known as: SYNTHROID  Take 1 tablet by mouth 30 minutes before breakfast     montelukast 10 mg tablet  Commonly known as: SINGULAIR  Take 1 tablet by mouth nightly.        STOP taking these medications    CIPRO 250 MG tablet  Generic drug: ciprofloxacin HCl            Indwelling Lines/Drains at time of discharge:   Lines/Drains/Airways     Central Venous Catheter Line  Duration                Hemodialysis AV Graft Left upper arm -- days                Time spent on the discharge of patient: 35 minutes         Nba Carmichael MD  Department of Hospital Medicine  [facility] - Intensive Care ([place])

## 2022-04-22 NOTE — PLAN OF CARE
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]  Yes    Hypoglycemia [E16.2]  Yes    ESRD (end stage renal disease) [N18.6]  Yes    CAD (coronary artery disease) [I25.10]  Yes    DM2 (diabetes mellitus, type 2) [E11.9]  Yes    HTN (hypertension) [I10]  Yes      Resolved Hospital Problems    Diagnosis Date Resolved POA    Anemia due to chronic kidney disease, on chronic dialysis [N18.6, D63.1, Z99.2] 04/16/2022 Not Applicable       Follow Up Appointments:  Future Appointments   Date Time Provider Department Center   4/23/2022  7:30 AM DIALYSIS, [facility] DL[facility]   4/27/2022 10:10 AM Albert Roberts MD [facility] OPHTHAL [facility]   6/29/2022  1:00 PM Myrna Day MD [facility] GASTRO [facility]       Allergies:  Review of patient's allergies indicates:   Allergen Reactions    Amlodipine Other (See Comments)    Atorvastatin Other (See Comments)    Nebivolol Other (See Comments)       Medications: Review discharge medications with patient and family and provide education.    Current Facility-Administered Medications   Medication Dose Route Frequency Provider Last Rate Last Admin    0.9%  NaCl infusion (for blood administration)   Intravenous Q24H PRN Ila Garcia MD        0.9%  NaCl infusion   Intravenous PRN Ila Garcia MD        0.9%  NaCl infusion   Intravenous Once Annmarie Rg DNP, FNP-C        acetaminophen tablet 1,000 mg   1,000 mg Oral Q8H Nba Carmichael MD   1,000 mg at 04/22/22 0611    albuterol-ipratropium 2.5 mg-0.5 mg/3 mL nebulizer solution 3 mL  3 mL Nebulization Q6H PRN Esau Reyes MD        cadexomer iodine 0.9 % gel   Topical (Top) Daily PRN William Baron DPM        carvediloL tablet 3.125 mg  3.125 mg Oral BID WM Ila Garcia MD   3.125 mg at 04/21/22 1747    cefTRIAXone (ROCEPHIN) 2 g/50 mL D5W IVPB  2 g Intravenous Q12H Juan Pablo Mata MD   Stopped at 04/22/22 0642    dextrose 10% bolus 125 mL  12.5 g Intravenous PRN Ila Garcia MD   Stopped at 04/15/22 1727    dextrose 10% bolus 250 mL  25 g Intravenous PRN Ila Garcia MD   Stopped at 04/16/22 0816    epoetin maximilian-epbx injection 4,080 Units  50 Units/kg Intravenous Every Mon, Wed, Fri Pam Santacruz MD        ethyl alcohoL 99 % Soln 5 mL  5 mL INTRANEURAL Once Ila Garcia MD        fluticasone furoate-vilanteroL 100-25 mcg/dose diskus inhaler 1 puff  1 puff Inhalation Daily Esau Reyes MD   1 puff at 04/20/22 0845    gelatin adsorbable 12-7 mm top sponge sponge 1 applicator  1 each Topical (Top) Once in dialysis Spencer Giron, NP        heparin (porcine) injection 1,000 Units  1,000 Units Intra-Catheter PRN Oniel Freeman MD        hydrALAZINE tablet 100 mg  100 mg Oral TID Nba Carmichael MD   100 mg at 04/22/22 0808    hydrALAZINE tablet 25 mg  25 mg Oral Q8H PRN Sancho Price DO        insulin aspart U-100 pen 0-5 Units  0-5 Units Subcutaneous QID (AC + HS) PRN Esau Reyes MD        isosorbide mononitrate tablet 20 mg  20 mg Oral BID Esau Reyes MD   20 mg at 04/22/22 0808    levothyroxine tablet 137 mcg  137 mcg Oral Daily Dago Reinoso DO   137 mcg at 04/22/22 0611    lisinopriL tablet 10 mg  10 mg Oral Daily Nba Carmichael MD   10 mg at 04/22/22 0809    melatonin tablet 6 mg  6 mg Oral Nightly PRN Esau Reyes MD        naloxone 0.4 mg/mL injection 0.02 mg  0.02 mg Intravenous PRN Esau HILLMAN  MD Eric        ondansetron injection 4 mg  4 mg Intravenous Q8H PRN Esau Reyes MD   4 mg at 04/17/22 0837    oxyCODONE immediate release tablet 5 mg  5 mg Oral Q6H PRN Ila Garcia MD        oxyCODONE immediate release tablet Tab 10 mg  10 mg Oral Q6H PRN Ila Garcia MD   10 mg at 04/20/22 2328    pantoprazole injection 40 mg  40 mg Intravenous Q12H Ila Garcia MD   40 mg at 04/22/22 0808    polyethylene glycol packet 17 g  17 g Oral Daily Ila Garcia MD   17 g at 04/22/22 0811    prochlorperazine injection Soln 5 mg  5 mg Intravenous Q6H PRN Esau Reyes MD   5 mg at 04/17/22 1754    sodium chloride 0.9% bolus 250 mL  250 mL Intravenous PRN Oniel Freeman MD        sodium chloride 0.9% flush 10 mL  10 mL Intravenous PRN Esau Reyes MD         Current Discharge Medication List      START taking these medications    Details   cefTRIAXone (ROCEPHIN) 2 g/50 mL PgBk IVPB Inject 50 mLs (2 g total) into the vein every 12 (twelve) hours. for 14 days  Qty: 1400 mL, Refills: 0      lisinopriL 10 MG tablet Take 1 tablet (10 mg total) by mouth once daily.  Qty: 90 tablet, Refills: 3    Comments: .      oxyCODONE (ROXICODONE) 5 MG immediate release tablet Take 1 tablet (5 mg total) by mouth every 4 (four) hours as needed for Pain.  Qty: 40 tablet, Refills: 0    Comments: Quantity prescribed more than 7 day supply? No      [id number]         CONTINUE these medications which have NOT CHANGED    Details   albuterol (PROVENTIL/VENTOLIN HFA) 90 mcg/actuation inhaler Inhale 2 puffs by mouth every 4  to 6 hours as needed      aspirin (ECOTRIN) 81 MG EC tablet Take 81 mg by mouth once daily.      blood sugar diagnostic Strp by Misc.(Non-Drug; Combo Route) route      budesonide-formoterol 160-4.5 mcg (SYMBICORT) 160-4.5 mcg/actuation HFAA Inhale 2 puffs into the lungs every 12 (twelve) hours. Controller      calcitRIOL (ROCALTROL) 0.25 MCG Cap Take 1 capsule by mouth once daily.       carvediloL (COREG) 3.125 MG tablet Take 3.125 mg by mouth 2 (two) times daily with meals.      glimepiride (AMARYL) 4 MG tablet Take 1 tablet by mouth every morning.      hydrALAZINE (APRESOLINE) 100 MG tablet Take 1 tablet by mouth 2 (two) times daily.      isosorbide mononitrate (ISMO,MONOKET) 20 MG Tab Take 1 tablet by mouth 2 (two) times daily.      levothyroxine (SYNTHROID) 150 MCG tablet Take 1 tablet by mouth 30 minutes before breakfast      montelukast (SINGULAIR) 10 mg tablet Take 1 tablet by mouth nightly.         STOP taking these medications       CIPRO 250 mg tablet Comments:   Reason for Stopping:         hydrALAZINE (APRESOLINE) 25 MG tablet Comments:   Reason for Stopping:                 I have seen and examined this patient within the last 30 days. My clinical findings that support the need for the home health skilled services and home bound status are the following:no   Weakness/numbness causing balance and gait disturbance due to Infection and Weakness/Debility making it taxing to leave home.  Requiring assistive device to leave home due to unsteady gait caused by  Infection and Weakness/Debility.  Patient with medication mismanagement issues requiring home bound status as evidenced by  Unstable vital signs (blood pressure, heart rate).  Medical restrictions requiring assistance of another human to leave home due to  Unstable ambulation, Frequent Falls and Vision impairment, with eye infection.  Mental confusion making it unsafe for patient to leave home alone due to  age related decrease.     Diet:   cardiac diet, diabetic diet 2000 calorie and renal diet    Labs and ABx infusion:    Ceftriaxone 2g q12h last dose 4/22/22 evening  First dose needed 4/23/22 morning    END through 5/5/22      Outpatient Antibiotic Therapy Plan:     Please send referral to [facility].     1) Infection: Endophthalmitis  2) Discharge Antibiotics:     Intravenous  antibiotics:  · Ceftriaxone 2 g IV q 12 hours      3) Therapy Duration:  2 weeks     Estimated end date of IV antibiotics: 05/05/2022     4) Outpatient Weekly Labs:     Order the following labs to be drawn on Mondays:   · CBC  · CMP   · ESR      5) Fax Lab Results to Infectious Diseases Provider: [facility] Fax Number: [phone]     6) Outpatient Infectious Diseases Follow-up     · Follow-up appointment will be arranged by the ID clinic and will be found in the patient's appointments tab.     · Prior to discharge, please ensure the patient's follow-up has been scheduled.    · If there is still no follow-up scheduled prior to discharge, please send an EPIC message to Christianne Burns in Infectious Diseases.      Referrals/ Consults  Physical Therapy to evaluate and treat. Evaluate for home safety and equipment needs; Establish/upgrade home exercise program. Perform / instruct on therapeutic exercises, gait training, transfer training, and Range of Motion.  Occupational Therapy to evaluate and treat. Evaluate home environment for safety and equipment needs. Perform/Instruct on transfers, ADL training, ROM, and therapeutic exercises.   to evaluate for community resources/long-range planning.  Aide to provide assistance with personal care, ADLs, and vital signs.    Activities:   activity as tolerated    Nursing:   Agency to admit patient within 24 hours of hospital discharge unless specified on physician order or at patient request    SN to complete comprehensive assessment including routine vital signs. Instruct on disease process and s/s of complications to report to MD. Review/verify medication list sent home with the patient at time of discharge  and instruct patient/caregiver as needed. Frequency may be adjusted depending on start of care date.     Skilled nurse to perform up to 3 visits PRN for symptoms related to diagnosis    Notify MD if SBP > 160 or < 90; DBP > 90 or < 50; HR > 120 or  < 50; Temp > 101; O2 < 88%;     Ok to schedule additional visits based on staff availability and patient request on consecutive days within the home health episode.    When multiple disciplines ordered:    Start of Care occurs on Sunday - Wednesday schedule remaining discipline evaluations as ordered on separate consecutive days following the start of care.    Thursday SOC -schedule subsequent evaluations Friday and Monday the following week.     Friday - Saturday SOC - schedule subsequent discipline evaluations on consecutive days starting Monday of the following week.    For all post-discharge communication and subsequent orders please contact patient's primary care physician. If unable to reach primary care physician or do not receive response within 30 minutes, please contact nephrologist for clinical staff order clarification    Miscellaneous   Routine Skin for Bedridden Patients: Instruct patient/caregiver to apply moisture barrier cream to all skin folds and wet areas in perineal area daily and after baths and all bowel movements.  Diabetic Care:   SN to perform and educate Diabetic management with blood glucose monitoring:, Fingerstick blood sugar AC and HS and Report CBG < 60 or > 350 to physician.  Midline care  Infusion needs    Home Health Aide:  Nursing Twice weekly and Daily and Home Health Aide Daily for 2 weeks    Wound Care Orders  wound care consult for coccyx and toe: Coccyx - triad wound dressing BID   Blister on first toe - betadine BID   Waffle overlay and chair cushion     I certify that this patient is confined to her home and needs intermittent skilled nursing care.

## 2022-04-22 NOTE — ASSESSMENT & PLAN NOTE
81 y/o female with a hx of ESRD on HD MWF vis AV LUE, CAD, HF, HTN, DM2, and recent admission to OSH 4/5-4/8 for Klebsiella pneumoniae bacteremia thought to be 2/2 UTI. Culture results show pansensitive klebsiella, and the patient was discharged on PO ciprofloxacin. Now with eye swelling and vision changes for 1-2 weeks.  Seen by ophthalmology with concern for endophthalmitis - CT orbit with minimal asymmetric enhancement at the periphery of the left globe compared to the right could represent a mild inflammatory or infectious process. Due to lack of trauma and wounds to the eye, expect hematogenous.Now s/p implant and     Bcx 4/14 NGTD    Recommendations:  - continue ceftriaxone 2 g IV q 12 x 2 weeks  - see OPAT note   - at f/u, will discuss po options for osteomyelitis

## 2022-04-22 NOTE — PLAN OF CARE
PT louis completed. Pt will begin PT POC.  Terri Quintanilla, PT  2022    Problem: Physical Therapy  Goal: Physical Therapy Goal  Description: Goals to be met by: 22     Patient will increase functional independence with mobility by performin. Sit to stand transfer with Contact Guard Assistance  2. Bed to chair transfer with Contact Guard Assistance using Rolling Walker  3. Gait  x 50 feet with Stand-by Assistance using Rolling Walker.     Outcome: Ongoing, Progressing

## 2022-04-22 NOTE — PLAN OF CARE
[facility] ([facility])  Discharge Final Note    Primary Care Provider: Minor Bennett MD  Expected Discharge Date: 4/22/2022    Patient medically ready for discharge to home with [facility].  Nurse can call report to n/a.  Transportation yes per son.   Is family/patient aware of discharge yes - patient and family.  Hospital follow up scheduled, yes, in AVS.  Final Discharge Note (most recent)       Final Note - 04/22/22 1559          Final Note    Assessment Type Final Discharge Note     Anticipated Discharge Disposition [place] [facility]     What phone number can be called within the next 1-3 days to see how you are doing after discharge? [phone] (P)      Hospital Resources/Appts/Education Provided Appointments scheduled and added to AVS (P)         Post-Acute Status    Post-Acute Authorization Home Health;IV Infusion (P)      Home Health Status Set-up Complete/Auth obtained (P)      Coverage [employer] (P)      IV Infusion Status Set-up Complete/Auth obtained (P)      Discharge Delays None known at this time (P)                      Important Message from Medicare         Referral Info (most recent)       Referral Info    No documentation.                 Contact Info       Minor Bennett MD   Specialty: Family Medicine   Relationship: PCP - General    [address] [address]   Phone: [phone]       Next Steps: Go on 5/3/2022    Instructions: Hospital follow up, at 9:00AM. Bring discharge paperwork.    RO[facility] Rosado [address]  [place] [address]  [phone]       Next Steps: Go on 4/26/2022    Instructions: Cardiology Appt., at 3:45 PM.    [facility] [place]   Specialty: Home Health Services    [address] [facility] [place] [address]   Phone: [phone]       Next Steps: Follow up    Instructions: For Home Health Services. Please contact Shweta LÓPEZ ([phone]) if they have not contacted you within  24 hours of discharge.    [facility]    [address]  [address] [place] [address]   Phone: [phone]       Next Steps: Go on 4/23/2022    Instructions: Please report to dialysis on Saturday, 4/23/2022, at 7:00 AM. Dialysis chair time, Monday, at 7:20AM. Call [phone], if needed.          Future Appointments   Date Time Provider Department Center   4/27/2022 10:10 AM Albert Roberts MD [facility] OPHTHAL [facility]   5/4/2022  2:30 PM Fam Alfonso MD [facility] ID [facility]   6/29/2022  1:00 PM Myrna Day MD [facility] GASTRO [facility]aver  Case Management  Ext: [phone]  04/22/2022

## 2022-04-22 NOTE — NURSING
END OF SHIFT NOTE  Pt rested well throughout shift, no distress at this time, no complaints, VSS, possible d/c today, awaiting eye culture results. bed in lowest position, side rails up x2. Bed alarm set, personal items within reach. JENNIFER ContrerasN RN

## 2022-04-22 NOTE — CONSULTS
[facility] ([place] [address])  Wound Care    Patient Name:  Erika Perera   MRN:  [id number]  Date: 4/22/2022  Diagnosis: Endophthalmitis    History:     Past Medical History:   Diagnosis Date    DM2 (diabetes mellitus, type 2) 4/14/2022    HTN (hypertension) 4/14/2022       Social History     Socioeconomic History    Marital status: Single   Tobacco Use    Smoking status: Never Smoker    Smokeless tobacco: Never Used       Precautions:     Allergies as of 04/14/2022 - Reviewed 04/14/2022   Allergen Reaction Noted    Amlodipine Other (See Comments) 12/11/2020    Atorvastatin Other (See Comments) 11/15/2018    Nebivolol Other (See Comments) 11/15/2018       WO Assessment Details/Treatment   Wound consult for coccyx    Mrs. Perera has a medial 0.7cm x 0.3 cm x 02 cm wound on her coccyx. She complains of tenderness to the site. She also has a ruptured blister on her right first toe. Patient states the blister appeared after a pedicure.     Plan:  Coccyx - triad wound dressing BID   Blister on first toe - betadine BID   Waffle overlay and chair cushion     Nursing to continue care and pressure prevention.   Recommendations made to primary team for above plan via secure chat. Orders placed.     Wound care will follow up PRN     coccyx      04/22/2022

## 2022-04-22 NOTE — CARE UPDATE
Outpatient Antibiotic Therapy Plan:    Please send referral to [facility].    1) Infection: Endophthalmitis  2) Discharge Antibiotics:    Intravenous antibiotics:   Ceftriaxone 2 g IV q 12 hours     3) Therapy Duration:  2 weeks    Estimated end date of IV antibiotics: 05/05/2022    4) Outpatient Weekly Labs:    Order the following labs to be drawn on Mondays:    CBC   CMP    ESR     5) Fax Lab Results to Infectious Diseases Provider: Shira    [facility] Fax Number: [phone]    6) Outpatient Infectious Diseases Follow-up     Follow-up appointment will be arranged by the ID clinic and will be found in the patient's appointments tab.     Prior to discharge, please ensure the patient's follow-up has been scheduled.     If there is still no follow-up scheduled prior to discharge, please send an EPIC message to Christianne Burns in Infectious Diseases.

## 2022-04-22 NOTE — PLAN OF CARE
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 Patient doing well, complaining of irritation from bandages, but improved eye pain.    A/P  - pressure patch changed to paper tape, patient aware patch should not be removed until follow up appointment.  - ABx per ID  - Patient scheduled follow up in ophthalmology for 4/27  - return precautions given    Ophthalmology will sign off. Patient okay for discharge. Has follow up scheduled. Please call on call resident if anything changes.

## 2022-04-22 NOTE — PLAN OF CARE
Problem: Adult Inpatient Plan of Care  Goal: Plan of Care Review  Outcome: Adequate for Care Transition  Goal: Patient-Specific Goal (Individualized)  Outcome: Adequate for Care Transition  Goal: Absence of Hospital-Acquired Illness or Injury  Outcome: Adequate for Care Transition  Goal: Optimal Comfort and Wellbeing  Outcome: Adequate for Care Transition  Goal: Readiness for Transition of Care  Outcome: Adequate for Care Transition     Problem: Diabetes Comorbidity  Goal: Blood Glucose Level Within Targeted Range  4/22/2022 1828 by Citlalli Barrios RN  Outcome: Adequate for Care Transition  4/22/2022 0733 by Citlalli Barrios RN  Outcome: Ongoing, Progressing     Problem: Skin Injury Risk Increased  Goal: Skin Health and Integrity  4/22/2022 1828 by Citlalli Barrios RN  Outcome: Adequate for Care Transition  4/22/2022 0733 by Citlalli Barrios RN  Outcome: Ongoing, Progressing     Problem: Device-Related Complication Risk (Hemodialysis)  Goal: Safe, Effective Therapy Delivery  Outcome: Adequate for Care Transition     Problem: Hemodynamic Instability (Hemodialysis)  Goal: Effective Tissue Perfusion  Outcome: Adequate for Care Transition     Problem: Infection (Hemodialysis)  Goal: Absence of Infection Signs and Symptoms  Outcome: Adequate for Care Transition     Problem: Impaired Wound Healing  Goal: Optimal Wound Healing  Outcome: Adequate for Care Transition     Problem: Infection  Goal: Absence of Infection Signs and Symptoms  Outcome: Adequate for Care Transition     Problem: Fall Injury Risk  Goal: Absence of Fall and Fall-Related Injury  4/22/2022 1828 by Citlalli Barrios RN  Outcome: Adequate for Care Transition  4/22/2022 0733 by Citlalli Barrios RN  Outcome: Ongoing, Progressing

## 2022-04-22 NOTE — SUBJECTIVE & OBJECTIVE
Interval History: Events noted. Pain resolved. No complaints.    Review of Systems   Constitutional:  Negative for fever.   Eyes:  Negative for pain.   All other systems reviewed and are negative.  Objective:     Vital Signs (Most Recent):  Temp: 98.7 °F (37.1 °C) (04/22/22 0803)  Pulse: (!) 58 (04/22/22 0808)  Resp: 20 (04/22/22 0803)  BP: (!) 129/58 (04/22/22 0808)  SpO2: 97 % (04/22/22 1019)   Vital Signs (24h Range):  Temp:  [98.1 °F (36.7 °C)-98.7 °F (37.1 °C)] 98.7 °F (37.1 °C)  Pulse:  [55-68] 58  Resp:  [18-27] 20  SpO2:  [94 %-100 %] 97 %  BP: (129-176)/(56-72) 129/58     Weight: 81.6 kg (180 lb)  Body mass index is 29.95 kg/m².    Estimated Creatinine Clearance: 8.2 mL/min (A) (based on SCr of 5.6 mg/dL (H)).    Physical Exam  Vitals and nursing note reviewed.   Constitutional:       Appearance: Normal appearance.   HENT:      Head: Normocephalic and atraumatic.   Cardiovascular:      Rate and Rhythm: Normal rate.   Neurological:      General: No focal deficit present.      Mental Status: She is alert and oriented to person, place, and time. Mental status is at baseline.   Psychiatric:         Mood and Affect: Mood normal.         Behavior: Behavior normal.         Thought Content: Thought content normal.         Judgment: Judgment normal.       Significant Labs: Blood Culture:   Recent Labs   Lab 04/14/22  1728 04/14/22  1729   LABBLOO No growth after 5 days. No growth after 5 days.     CBC:   Recent Labs   Lab 04/21/22  0327   WBC 12.98*   HGB 10.0*   HCT 31.8*        CMP:   Recent Labs   Lab 04/21/22  0327   *   K 4.5   CL 93*   CO2 23   GLU 93   BUN 44*   CREATININE 5.6*   CALCIUM 9.1   PROT 6.4   ALBUMIN 2.1*   BILITOT 0.9   ALKPHOS 99   AST 21   ALT <5*   ANIONGAP 16   EGFRNONAA 6.6*     Wound Culture:   Recent Labs   Lab 04/16/22  1112 04/20/22  1847 04/20/22 1913   LABAERO No growth No growth No growth       Significant Imaging: I have reviewed all pertinent imaging results/findings  within the past 24 hours.

## 2022-04-25 ENCOUNTER — TELEPHONE (OUTPATIENT)
Dept: OPHTHALMOLOGY | Facility: CLINIC | Age: 82
End: 2022-04-25
Payer: MEDICARE

## 2022-04-25 ENCOUNTER — TELEPHONE (OUTPATIENT)
Dept: PODIATRY | Facility: CLINIC | Age: 82
End: 2022-04-25
Payer: MEDICARE

## 2022-04-25 LAB
BACTERIA SPEC AEROBE CULT: ABNORMAL
BACTERIA SPEC AEROBE CULT: NO GROWTH

## 2022-04-25 NOTE — TELEPHONE ENCOUNTER
Called the pt and spoke with Vincent Perera and scheduled her appt.    Adam GOULD    ----- Message from Holly Mon MD sent at 4/25/2022  9:37 AM CDT -----  Regarding: Hospital follow up  Please make an appointment for the above patient with Dr. Hernadez (or Dr. Jackson if no availabilities for Dr. Hernadez) in 14 days for right great toe wound check.    Patient was seen as inpatient by podiatry and was recently discharged from [facility].  Family is requesting to be followed on the [facility]. If patient declines an appointment please let me know.    Thanks!      Holly Mon DPM  [facility]  Podiatry PGY3  Pager: [phone]  Spectra:[phone]

## 2022-04-25 NOTE — TELEPHONE ENCOUNTER
Returned pt call she was not home and at a Dr appt a msg was left ----- Message from Kourtney Triplett MA sent at 4/22/2022 12:23 PM CDT -----    ----- Message -----  From: Dustin Humphreys MD  Sent: 4/22/2022  12:11 PM CDT  To: [facility] Ophthalmology Triage    Patient needs to be seen by Dr. Reyna and not Dr. Roberts on 4/27. Theyhad an evisceration with Dr. Reyna on 4/20.

## 2022-04-26 ENCOUNTER — PATIENT MESSAGE (OUTPATIENT)
Dept: OPHTHALMOLOGY | Facility: CLINIC | Age: 82
End: 2022-04-26
Payer: MEDICARE

## 2022-04-27 ENCOUNTER — PATIENT MESSAGE (OUTPATIENT)
Dept: GASTROENTEROLOGY | Facility: CLINIC | Age: 82
End: 2022-04-27
Payer: MEDICARE

## 2022-04-28 ENCOUNTER — HOSPITAL ENCOUNTER (EMERGENCY)
Facility: HOSPITAL | Age: 82
Discharge: HOME | End: 2022-04-28
Attending: EMERGENCY MEDICINE
Payer: MEDICARE

## 2022-04-28 ENCOUNTER — PATIENT MESSAGE (OUTPATIENT)
Dept: OPHTHALMOLOGY | Facility: CLINIC | Age: 82
End: 2022-04-28
Payer: MEDICARE

## 2022-04-28 VITALS
SYSTOLIC BLOOD PRESSURE: 177 MMHG | HEART RATE: 70 BPM | BODY MASS INDEX: 27.29 KG/M2 | RESPIRATION RATE: 18 BRPM | DIASTOLIC BLOOD PRESSURE: 80 MMHG | TEMPERATURE: 98 F | OXYGEN SATURATION: 98 % | WEIGHT: 164 LBS

## 2022-04-28 DIAGNOSIS — Z51.89 VISIT FOR WOUND CHECK: Primary | ICD-10-CM

## 2022-04-28 LAB
BACTERIA SPEC ANAEROBE CULT: NORMAL
BACTERIA SPEC ANAEROBE CULT: NORMAL

## 2022-04-28 PROCEDURE — 99281 EMR DPT VST MAYX REQ PHY/QHP: CPT | Mod: ER

## 2022-04-28 RX ORDER — ERYTHROMYCIN 5 MG/G
OINTMENT OPHTHALMIC
Status: DISCONTINUED | OUTPATIENT
Start: 2022-04-28 | End: 2022-04-28 | Stop reason: HOSPADM

## 2022-04-28 NOTE — ED PROVIDER NOTES
"Encounter Date: 4/28/2022       History     Chief Complaint   Patient presents with    Eye Problem     Pt states," I had my left eye removed one week ago. I have bleeding now."     Cc:  Surgical site bleeding    HPI:  Pt is a 83yo female who reports having had evisceration of the left eye on 4/20/2022.  She reports that 2d ago having some bloody drainage.  She reports the site is very itchy.  Denies fever or any other concerns at this time.    The history is provided by the patient. No  was used.     Review of patient's allergies indicates:   Allergen Reactions    Amlodipine Other (See Comments)    Atorvastatin Other (See Comments)    Nebivolol Other (See Comments)     Past Medical History:   Diagnosis Date    DM2 (diabetes mellitus, type 2) 4/14/2022    HTN (hypertension) 4/14/2022     Past Surgical History:   Procedure Laterality Date    CATARACT EXTRACTION Bilateral     EVISCERATION OF OCULAR CONTENTS Left 4/20/2022    Procedure: EVISCERATION, OCULAR CONTENTS;  Surgeon: Dorcas Reyna MD;  Location: [address];  Service: Ophthalmology;  Laterality: Left;    RETROBULBAR INJECTION OF MEDICATION Left 4/20/2022    Procedure: INJECTION, MEDICATION, RETROBULBAR;  Surgeon: Dorcas Reyna MD;  Location: [address];  Service: Ophthalmology;  Laterality: Left;    RETROBULBAR INJECTION OF MEDICATION  4/20/2022    Procedure: ;  Surgeon: Dorcas Reyna MD;  Location: [address];  Service: Ophthalmology;;    TARSORRHAPHY Left 4/20/2022    Procedure: BLEPHARORRHAPHY;  Surgeon: Dorcas Reyna MD;  Location: [address];  Service: Ophthalmology;  Laterality: Left;     Family History   Problem Relation Age of Onset    No Known Problems Mother     No Known Problems Father     No Known Problems Sister     No Known Problems Brother     No Known Problems Maternal Aunt     No Known Problems Maternal Uncle     No Known Problems Paternal Aunt     No Known Problems Paternal Uncle  "    No Known Problems Maternal Grandmother     No Known Problems Maternal Grandfather     No Known Problems Paternal Grandmother     No Known Problems Paternal Grandfather     Amblyopia Neg Hx     Blindness Neg Hx     Cancer Neg Hx     Cataracts Neg Hx     Diabetes Neg Hx     Glaucoma Neg Hx     Hypertension Neg Hx     Macular degeneration Neg Hx     Retinal detachment Neg Hx     Strabismus Neg Hx     Stroke Neg Hx     Thyroid disease Neg Hx      Social History     Tobacco Use    Smoking status: Never Smoker    Smokeless tobacco: Never Used     Review of Systems   Constitutional: Negative for chills, fatigue and fever.   HENT: Negative for congestion, ear discharge, ear pain, postnasal drip, rhinorrhea, sinus pressure, sneezing, sore throat and voice change.    Eyes: Negative for discharge and itching.        Left eye site bleeding, itching   Respiratory: Negative for cough, shortness of breath and wheezing.    Cardiovascular: Negative for chest pain, palpitations and leg swelling.   Gastrointestinal: Negative for abdominal pain, constipation, diarrhea, nausea and vomiting.   Endocrine: Negative for polydipsia, polyphagia and polyuria.   Genitourinary: Negative for dysuria, frequency, hematuria, urgency, vaginal bleeding, vaginal discharge and vaginal pain.   Musculoskeletal: Negative for arthralgias and myalgias.   Skin: Negative for rash and wound.   Neurological: Negative for dizziness, seizures, syncope, weakness and numbness.   Hematological: Negative for adenopathy. Does not bruise/bleed easily.   Psychiatric/Behavioral: Negative for self-injury and suicidal ideas. The patient is not nervous/anxious.        Physical Exam     Initial Vitals [04/28/22 1419]   BP Pulse Resp Temp SpO2   (!) 188/78 67 16 98 °F (36.7 °C) 99 %      MAP       --         Physical Exam    Nursing note and vitals reviewed.  Constitutional: She appears well-developed and well-nourished.   HENT:   Head: Normocephalic and  atraumatic.   Right Ear: External ear normal.   Left Ear: External ear normal.   Nose: Nose normal.   Eyes: Conjunctivae and EOM are normal. Pupils are equal, round, and reactive to light. Right eye exhibits no discharge. Left eye exhibits no discharge.   Left eye in eyeshield.  Eyeshield removed to reveal closed eyelid with splint device in place.  Some coagulated blood present, removed, not reaccumulating.   Neck:   Normal range of motion.  Abdominal: She exhibits no distension.   Musculoskeletal:         General: Normal range of motion.      Cervical back: Normal range of motion.     Neurological: She is alert and oriented to person, place, and time.   Skin: Skin is dry. Capillary refill takes less than 2 seconds.         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  She presents today for bloody drainage.  Differential Diagnosis:   Surgical wound infection, cellulitis, encephalitis  ED Management:  Following my examination I spoke with Ophthalmology resident  who felt that the findings my physical assessment for within normal limits for this.  Of healing.  He agreed with the plan to use topical erythromycin ointment and have the patient follow-up as scheduled on Tuesday.             ED Course as of 04/28/22 1921 Thu Apr 28, 2022   1424 BP(!): 188/78 [VC]   1425 Temp: 98 °F (36.7 °C) [VC]   1425 Temp src: Oral [VC]   1425 Pulse: 67 [VC]   1425 Resp: 16 [VC]   1425 SpO2: 99 % [VC]      ED Course User Index  [VC] Hoang Trevizo DNP             Clinical Impression:   Final diagnoses:  [Z51.89] Visit for wound check (Primary)          ED Disposition Condition    Discharge Stable        ED Prescriptions     None        Follow-up Information     Follow up With  Specialties Details Why Contact Info    Dorcas Reyna MD Ophthalmology On 5/3/2022  [address] [facility] [address]  [phone]             Hoang Trevizo DNP  04/28/22 1921

## 2022-04-28 NOTE — DISCHARGE INSTRUCTIONS
Keep eye bandaged and shielded.  Return to the Emergency Department for any worsening, change in condition, or any emergent concerns.

## 2022-05-03 ENCOUNTER — TELEPHONE (OUTPATIENT)
Dept: OPHTHALMOLOGY | Facility: CLINIC | Age: 82
End: 2022-05-03

## 2022-05-03 ENCOUNTER — OFFICE VISIT (OUTPATIENT)
Dept: OPHTHALMOLOGY | Facility: CLINIC | Age: 82
End: 2022-05-03
Payer: MEDICARE

## 2022-05-03 ENCOUNTER — PATIENT MESSAGE (OUTPATIENT)
Dept: OPHTHALMOLOGY | Facility: CLINIC | Age: 82
End: 2022-05-03

## 2022-05-03 DIAGNOSIS — Z98.890 POST-OPERATIVE STATE: Primary | ICD-10-CM

## 2022-05-03 DIAGNOSIS — H44.002 ENDOPHTHALMITIS, LEFT EYE: ICD-10-CM

## 2022-05-03 PROCEDURE — 1126F AMNT PAIN NOTED NONE PRSNT: CPT | Mod: CPTII,S$GLB,, | Performed by: OPHTHALMOLOGY

## 2022-05-03 PROCEDURE — 1157F PR ADVANCE CARE PLAN OR EQUIV PRESENT IN MEDICAL RECORD: ICD-10-PCS | Mod: CPTII,S$GLB,, | Performed by: OPHTHALMOLOGY

## 2022-05-03 PROCEDURE — 1101F PT FALLS ASSESS-DOCD LE1/YR: CPT | Mod: CPTII,S$GLB,, | Performed by: OPHTHALMOLOGY

## 2022-05-03 PROCEDURE — 1159F MED LIST DOCD IN RCRD: CPT | Mod: CPTII,S$GLB,, | Performed by: OPHTHALMOLOGY

## 2022-05-03 PROCEDURE — 99999 PR PBB SHADOW E&M-EST. PATIENT-LVL II: CPT | Mod: PBBFAC,,, | Performed by: OPHTHALMOLOGY

## 2022-05-03 PROCEDURE — 3288F FALL RISK ASSESSMENT DOCD: CPT | Mod: CPTII,S$GLB,, | Performed by: OPHTHALMOLOGY

## 2022-05-03 PROCEDURE — 92285 EXTERNAL PHOTOGRAPHY - OU - BOTH EYES: ICD-10-PCS | Mod: S$GLB,,, | Performed by: OPHTHALMOLOGY

## 2022-05-03 PROCEDURE — 1157F ADVNC CARE PLAN IN RCRD: CPT | Mod: CPTII,S$GLB,, | Performed by: OPHTHALMOLOGY

## 2022-05-03 PROCEDURE — 1101F PR PT FALLS ASSESS DOC 0-1 FALLS W/OUT INJ PAST YR: ICD-10-PCS | Mod: CPTII,S$GLB,, | Performed by: OPHTHALMOLOGY

## 2022-05-03 PROCEDURE — 1159F PR MEDICATION LIST DOCUMENTED IN MEDICAL RECORD: ICD-10-PCS | Mod: CPTII,S$GLB,, | Performed by: OPHTHALMOLOGY

## 2022-05-03 PROCEDURE — 1126F PR PAIN SEVERITY QUANTIFIED, NO PAIN PRESENT: ICD-10-PCS | Mod: CPTII,S$GLB,, | Performed by: OPHTHALMOLOGY

## 2022-05-03 PROCEDURE — 92285 EXTERNAL OCULAR PHOTOGRAPHY: CPT | Mod: S$GLB,,, | Performed by: OPHTHALMOLOGY

## 2022-05-03 PROCEDURE — 99999 PR PBB SHADOW E&M-EST. PATIENT-LVL II: ICD-10-PCS | Mod: PBBFAC,,, | Performed by: OPHTHALMOLOGY

## 2022-05-03 PROCEDURE — 1160F PR REVIEW ALL MEDS BY PRESCRIBER/CLIN PHARMACIST DOCUMENTED: ICD-10-PCS | Mod: CPTII,S$GLB,, | Performed by: OPHTHALMOLOGY

## 2022-05-03 PROCEDURE — 3288F PR FALLS RISK ASSESSMENT DOCUMENTED: ICD-10-PCS | Mod: CPTII,S$GLB,, | Performed by: OPHTHALMOLOGY

## 2022-05-03 PROCEDURE — 99024 POSTOP FOLLOW-UP VISIT: CPT | Mod: S$GLB,,, | Performed by: OPHTHALMOLOGY

## 2022-05-03 PROCEDURE — 1160F RVW MEDS BY RX/DR IN RCRD: CPT | Mod: CPTII,S$GLB,, | Performed by: OPHTHALMOLOGY

## 2022-05-03 PROCEDURE — 99024 PR POST-OP FOLLOW-UP VISIT: ICD-10-PCS | Mod: S$GLB,,, | Performed by: OPHTHALMOLOGY

## 2022-05-03 NOTE — PROGRESS NOTES
DAMARIS     Erika Perera is a/an 82 y.o. female here for 1 week post-op. Pt had   some irritation and tearing after her sx.    Date of Procedure: 4/14/2022  Procedure: Evisceration   Oral antibiotics: None at this time tok Tramadol but not now   Eye meds: Maxitrol Oint TID        Last edited by Radha Reyes on 5/3/2022  2:01 PM. (History)            Assessment /Plan     For exam results, see Encounter Report.    Post-operative state    Endophthalmitis, left eye      Patient doing well! Post-operative instructions reviewed. Sutures removed. All questions answered.  Return in 5 weeks prn sooner any worsening of vision/symptoms or any concerns.

## 2022-05-04 ENCOUNTER — OFFICE VISIT (OUTPATIENT)
Dept: INFECTIOUS DISEASES | Facility: CLINIC | Age: 82
End: 2022-05-04
Payer: MEDICARE

## 2022-05-04 DIAGNOSIS — M86.271 SUBACUTE OSTEOMYELITIS OF RIGHT FOOT: ICD-10-CM

## 2022-05-04 DIAGNOSIS — B96.1 BACTEREMIA DUE TO KLEBSIELLA PNEUMONIAE: ICD-10-CM

## 2022-05-04 DIAGNOSIS — H44.002 LEFT ENDOPHTHALMIA: Primary | ICD-10-CM

## 2022-05-04 DIAGNOSIS — N18.6 ESRD (END STAGE RENAL DISEASE): ICD-10-CM

## 2022-05-04 DIAGNOSIS — R78.81 BACTEREMIA DUE TO KLEBSIELLA PNEUMONIAE: ICD-10-CM

## 2022-05-04 PROCEDURE — 99213 OFFICE O/P EST LOW 20 MIN: CPT | Mod: 95,,, | Performed by: INTERNAL MEDICINE

## 2022-05-04 PROCEDURE — 1111F DSCHRG MED/CURRENT MED MERGE: CPT | Mod: CPTII,95,, | Performed by: INTERNAL MEDICINE

## 2022-05-04 PROCEDURE — 3072F LOW RISK FOR RETINOPATHY: CPT | Mod: CPTII,95,, | Performed by: INTERNAL MEDICINE

## 2022-05-04 PROCEDURE — 1111F PR DISCHARGE MEDS RECONCILED W/ CURRENT OUTPATIENT MED LIST: ICD-10-PCS | Mod: CPTII,95,, | Performed by: INTERNAL MEDICINE

## 2022-05-04 PROCEDURE — 1157F ADVNC CARE PLAN IN RCRD: CPT | Mod: CPTII,95,, | Performed by: INTERNAL MEDICINE

## 2022-05-04 PROCEDURE — 99213 PR OFFICE/OUTPT VISIT, EST, LEVL III, 20-29 MIN: ICD-10-PCS | Mod: 95,,, | Performed by: INTERNAL MEDICINE

## 2022-05-04 PROCEDURE — 1157F PR ADVANCE CARE PLAN OR EQUIV PRESENT IN MEDICAL RECORD: ICD-10-PCS | Mod: CPTII,95,, | Performed by: INTERNAL MEDICINE

## 2022-05-04 PROCEDURE — 3072F PR LOW RISK FOR RETINOPATHY: ICD-10-PCS | Mod: CPTII,95,, | Performed by: INTERNAL MEDICINE

## 2022-05-04 NOTE — PROGRESS NOTES
The patient location is: bacteremia and endophthalmitis  The chief complaint leading to consultation is: IV antibiotics.    Visit type: audiovisual    Face to Face time with patient: 15 minutes  30 minutes of total time spent on the encounter, which includes face to face time and non-face to face time preparing to see the patient (eg, review of tests), Obtaining and/or reviewing separately obtained history, Documenting clinical information in the electronic or other health record, Independently interpreting results (not separately reported) and communicating results to the patient/family/caregiver, or Care coordination (not separately reported).         Each patient to whom he or she provides medical services by telemedicine is:  (1) informed of the relationship between the physician and patient and the respective role of any other health care provider with respect to management of the patient; and (2) notified that he or she may decline to receive medical services by telemedicine and may withdraw from such care at any time.    Notes:   Chart reviewed, patient and son interviewed by videochat.  83 yo female with ESRD, dialysis MWF, who is finishing her IV ceftriaxone for endophthalmitis (s/p left evisceration). She also has a foot ulcer - currently receiving wound care.Patient and her son were on the call, she has no complaints. PICC in right arm without complaints or problems. Will stop IV antibiotics and continue wound care for post-operative eye wound as well as foot wound. Patient and son instructed to contact team with any problems or concerns.

## 2022-05-05 ENCOUNTER — PATIENT MESSAGE (OUTPATIENT)
Dept: INFECTIOUS DISEASES | Facility: CLINIC | Age: 82
End: 2022-05-05
Payer: MEDICARE

## 2022-05-06 LAB
FINAL PATHOLOGIC DIAGNOSIS: NORMAL
GROSS: NORMAL
Lab: NORMAL
MICROSCOPIC EXAM: NORMAL

## 2022-05-16 ENCOUNTER — EXTERNAL HOME HEALTH (OUTPATIENT)
Dept: HOME HEALTH SERVICES | Facility: HOSPITAL | Age: 82
End: 2022-05-16
Payer: MEDICARE

## 2022-05-17 ENCOUNTER — TELEPHONE (OUTPATIENT)
Dept: GASTROENTEROLOGY | Facility: CLINIC | Age: 82
End: 2022-05-17
Payer: MEDICARE

## 2022-05-17 NOTE — TELEPHONE ENCOUNTER
----- Message from Sofiya Johnson MA sent at 4/25/2022  9:05 PM CDT -----  Regarding: FW: clinic f/u  Delaney,   This is a patient that need a follow up with Dr Shay Grider  ----- Message -----  From: Myrna Day MD  Sent: 4/16/2022   1:09 PM CDT  To: Shay JOHNSON Staff  Subject: clinic f/u                                       Can we please schedule the patient for follow up with me in within the next 3 months? thanks

## 2022-05-26 ENCOUNTER — PATIENT MESSAGE (OUTPATIENT)
Dept: PODIATRY | Facility: CLINIC | Age: 82
End: 2022-05-26
Payer: MEDICARE

## 2022-06-24 ENCOUNTER — DOCUMENT SCAN (OUTPATIENT)
Dept: HOME HEALTH SERVICES | Facility: HOSPITAL | Age: 82
End: 2022-06-24
Payer: MEDICARE

## 2022-06-28 ENCOUNTER — TELEPHONE (OUTPATIENT)
Dept: GASTROENTEROLOGY | Facility: CLINIC | Age: 82
End: 2022-06-28
Payer: MEDICARE

## 2022-06-28 NOTE — TELEPHONE ENCOUNTER
----- Message from Sofiya Johnson MA sent at 6/27/2022  9:06 PM CDT -----  Can you please get this patient in on a Tuesday or Thursday with any provider.  Marni  ----- Message -----  From: Sofiya Johnson MA  Sent: 6/27/2022   4:27 PM CDT  To: Sofiya Johnson MA

## 2022-06-30 ENCOUNTER — DOCUMENT SCAN (OUTPATIENT)
Dept: HOME HEALTH SERVICES | Facility: HOSPITAL | Age: 82
End: 2022-06-30
Payer: MEDICARE

## 2022-07-02 ENCOUNTER — PATIENT MESSAGE (OUTPATIENT)
Dept: GASTROENTEROLOGY | Facility: CLINIC | Age: 82
End: 2022-07-02
Payer: MEDICARE

## 2022-07-08 ENCOUNTER — PATIENT MESSAGE (OUTPATIENT)
Dept: OPHTHALMOLOGY | Facility: CLINIC | Age: 82
End: 2022-07-08
Payer: MEDICARE

## 2022-07-21 ENCOUNTER — OFFICE VISIT (OUTPATIENT)
Dept: OPHTHALMOLOGY | Facility: CLINIC | Age: 82
End: 2022-07-21
Payer: MEDICARE

## 2022-07-21 DIAGNOSIS — Z98.890 POST-OPERATIVE STATE: Primary | ICD-10-CM

## 2022-07-21 DIAGNOSIS — H44.002 ENDOPHTHALMITIS, LEFT EYE: ICD-10-CM

## 2022-07-21 DIAGNOSIS — Z96.1 PSEUDOPHAKIA OF BOTH EYES: ICD-10-CM

## 2022-07-21 PROCEDURE — 1126F AMNT PAIN NOTED NONE PRSNT: CPT | Mod: CPTII,S$GLB,, | Performed by: OPHTHALMOLOGY

## 2022-07-21 PROCEDURE — 1160F RVW MEDS BY RX/DR IN RCRD: CPT | Mod: CPTII,S$GLB,, | Performed by: OPHTHALMOLOGY

## 2022-07-21 PROCEDURE — 1126F PR PAIN SEVERITY QUANTIFIED, NO PAIN PRESENT: ICD-10-PCS | Mod: CPTII,S$GLB,, | Performed by: OPHTHALMOLOGY

## 2022-07-21 PROCEDURE — 1159F PR MEDICATION LIST DOCUMENTED IN MEDICAL RECORD: ICD-10-PCS | Mod: CPTII,S$GLB,, | Performed by: OPHTHALMOLOGY

## 2022-07-21 PROCEDURE — 1101F PR PT FALLS ASSESS DOC 0-1 FALLS W/OUT INJ PAST YR: ICD-10-PCS | Mod: CPTII,S$GLB,, | Performed by: OPHTHALMOLOGY

## 2022-07-21 PROCEDURE — 99024 PR POST-OP FOLLOW-UP VISIT: ICD-10-PCS | Mod: S$GLB,,, | Performed by: OPHTHALMOLOGY

## 2022-07-21 PROCEDURE — 1157F PR ADVANCE CARE PLAN OR EQUIV PRESENT IN MEDICAL RECORD: ICD-10-PCS | Mod: CPTII,S$GLB,, | Performed by: OPHTHALMOLOGY

## 2022-07-21 PROCEDURE — 3288F PR FALLS RISK ASSESSMENT DOCUMENTED: ICD-10-PCS | Mod: CPTII,S$GLB,, | Performed by: OPHTHALMOLOGY

## 2022-07-21 PROCEDURE — 1101F PT FALLS ASSESS-DOCD LE1/YR: CPT | Mod: CPTII,S$GLB,, | Performed by: OPHTHALMOLOGY

## 2022-07-21 PROCEDURE — 1160F PR REVIEW ALL MEDS BY PRESCRIBER/CLIN PHARMACIST DOCUMENTED: ICD-10-PCS | Mod: CPTII,S$GLB,, | Performed by: OPHTHALMOLOGY

## 2022-07-21 PROCEDURE — 99999 PR PBB SHADOW E&M-EST. PATIENT-LVL III: ICD-10-PCS | Mod: PBBFAC,,, | Performed by: OPHTHALMOLOGY

## 2022-07-21 PROCEDURE — 99024 POSTOP FOLLOW-UP VISIT: CPT | Mod: S$GLB,,, | Performed by: OPHTHALMOLOGY

## 2022-07-21 PROCEDURE — 99999 PR PBB SHADOW E&M-EST. PATIENT-LVL III: CPT | Mod: PBBFAC,,, | Performed by: OPHTHALMOLOGY

## 2022-07-21 PROCEDURE — 92285 EXTERNAL PHOTOGRAPHY - OU - BOTH EYES: ICD-10-PCS | Mod: S$GLB,,, | Performed by: OPHTHALMOLOGY

## 2022-07-21 PROCEDURE — 3288F FALL RISK ASSESSMENT DOCD: CPT | Mod: CPTII,S$GLB,, | Performed by: OPHTHALMOLOGY

## 2022-07-21 PROCEDURE — 1159F MED LIST DOCD IN RCRD: CPT | Mod: CPTII,S$GLB,, | Performed by: OPHTHALMOLOGY

## 2022-07-21 PROCEDURE — 92285 EXTERNAL OCULAR PHOTOGRAPHY: CPT | Mod: S$GLB,,, | Performed by: OPHTHALMOLOGY

## 2022-07-21 PROCEDURE — 1157F ADVNC CARE PLAN IN RCRD: CPT | Mod: CPTII,S$GLB,, | Performed by: OPHTHALMOLOGY

## 2022-07-21 NOTE — PROGRESS NOTES
DAMARIS     Erika Perera is a/an 82 y.o. female here for 5 week post-op. No   complaints    Date of Procedure: 4/14/2022  Procedure: Evisceration   Oral antibiotics: None  Eye meds: Maxitrol Oint TID        Last edited by Veena Griggs, PCT on 7/21/2022  1:13 PM. (History)            Assessment /Plan     For exam results, see Encounter Report.    Post-operative state    Endophthalmitis, left eye    Pseudophakia of both eyes      Healing well. Ok for prosthesis.     Return after prosthesis fitting.

## 2022-08-28 ENCOUNTER — PATIENT MESSAGE (OUTPATIENT)
Dept: OPHTHALMOLOGY | Facility: CLINIC | Age: 82
End: 2022-08-28
Payer: MEDICARE

## 2022-09-15 ENCOUNTER — TELEPHONE (OUTPATIENT)
Dept: OPHTHALMOLOGY | Facility: CLINIC | Age: 82
End: 2022-09-15
Payer: MEDICARE

## 2022-09-15 NOTE — TELEPHONE ENCOUNTER
----- Message from Nimco Lindsey sent at 9/15/2022  8:44 AM CDT -----  Contact: Erika @[phone]  Pt calling to schedule her appt for her 2nd procedure . She states she needs a Tues/Thurs appt date

## 2023-02-05 ENCOUNTER — PATIENT MESSAGE (OUTPATIENT)
Dept: OPHTHALMOLOGY | Facility: CLINIC | Age: 83
End: 2023-02-05
Payer: MEDICARE

## 2024-09-21 NOTE — CONSULTS
[facility] ([place] [address])  Infectious Disease  Consult Note    Patient Name: Erika Perera  MRN: [id number]  Admission Date: 4/14/2022  Hospital Length of Stay: 1 days  Attending Physician: Ila Garcia MD  Primary Care Provider: Minor Bennett MD     Isolation Status: No active isolations    Patient information was obtained from patient, past medical records and ER records.      Inpatient consult to Infectious Diseases  Consult performed by: Fareed Valladares MD  Consult ordered by: Ila Garcia MD        Assessment/Plan:     * Endophthalmitis  83 y/o female with a hx of ESRD on HD MWF vis AV LUE, CAD, HF, HTN, DM2, and recent admission to OSH 4/5-4/8 for Klebsiella pneumoniae bacteremia thought to be 2/2 UTI. Culture results show pansensitive klebsiella, and the patient was discharged on PO ciprofloxacin. Now with eye swelling and vision changes for one week- seen by ophthalmology with concern for endophthalmitis - CT orbit with minimal asymmetric enhancement at the periphery of the left globe compared to the right could represent a mild inflammatory or infectious process. Due to lack of trauma and wounds to the eye, expect hematogones spread possibly from recent bacteremia, unclear if source is urinary as patient rarely makes urine and denies any urinary symptoms - kleb pnemo tend too cause liver abscess.    Recommendations:    1. Continue IV CTX 2 g Q24.  2. Obtain U/S abdomen to r/o intraabdominal source (liver abscess).  3. Plan for eviscerated vs enucleated next week per ophthalmology if able please obtain cultures (gram stain / aerobic/anerobic/fungus and AFB cultures).  4. Will follow up blood cultures.        Thank you for your consult. I will follow-up with patient. Please contact us if you have any additional questions.    Fareed Valladares MD  Infectious Disease  [facility] - [facility] ([place] [address])    Subjective:     Principal Problem:  Endophthalmitis    HPI: Erika Perera is an 83 y/o female with a hx of ESRD on HD MWF vis AV LUE, CAD, HF, HTN, DM2, and HLD who presents to the ED from ophthalmology clinic for L eye pain and swelling x 1 week.     Patient was recently admitted to OSH 4/5-4/8 for Klebsiella pneumoniae bacteremia thought to be 2/2 UTI. Culture results show pansensitive klebsiella, and the patient was discharged on PO ciprofloxacin and reports compliance with the regimen. She was Seen in optometry clinic and was referred to ophthalmology clinic 04/14 over concerns for endophthalmitis. She was then sent to the ED for CT orbits and admit for likely need for enucleation and evisceration due to the severity of the disease.    She has been having worsening vision and eye swelling for 1 week, denies any fever chills, N/V abdominal pain or diarrhea. She denies any eye truama.        Past Medical History:   Diagnosis Date    DM2 (diabetes mellitus, type 2) 4/14/2022    HTN (hypertension) 4/14/2022       Past Surgical History:   Procedure Laterality Date    CATARACT EXTRACTION Bilateral        Review of patient's allergies indicates:   Allergen Reactions    Amlodipine Other (See Comments)    Atorvastatin Other (See Comments)    Nebivolol Other (See Comments)       Medications:  Medications Prior to Admission   Medication Sig    albuterol (PROVENTIL/VENTOLIN HFA) 90 mcg/actuation inhaler Inhale 2 puffs by mouth every 4  to 6 hours as needed    aspirin (ECOTRIN) 81 MG EC tablet Take 81 mg by mouth once daily.    blood sugar diagnostic Strp by Misc.(Non-Drug; Combo Route) route    budesonide-formoterol 160-4.5 mcg (SYMBICORT) 160-4.5 mcg/actuation HFAA Inhale 2 puffs into the lungs every 12 (twelve) hours. Controller    calcitRIOL (ROCALTROL) 0.25 MCG Cap Take 1 capsule by mouth once daily.    carvediloL (COREG) 3.125 MG tablet Take 3.125 mg by mouth 2 (two) times daily with meals.    CIPRO 250 mg tablet Take 250 mg by mouth once  daily.    glimepiride (AMARYL) 4 MG tablet Take 1 tablet by mouth every morning.    hydrALAZINE (APRESOLINE) 100 MG tablet Take 1 tablet by mouth 2 (two) times daily.    hydrALAZINE (APRESOLINE) 25 MG tablet Take 25 mg by mouth 3 (three) times daily.    isosorbide mononitrate (ISMO,MONOKET) 20 MG Tab Take 1 tablet by mouth 2 (two) times daily.    levothyroxine (SYNTHROID) 150 MCG tablet Take 1 tablet by mouth 30 minutes before breakfast    montelukast (SINGULAIR) 10 mg tablet Take 1 tablet by mouth nightly.     Antibiotics (From admission, onward)                Start     Stop Route Frequency Ordered    04/15/22 1800  cefTRIAXone (ROCEPHIN) 2 g/50 mL D5W IVPB         -- IV Every 24 hours (non-standard times) 04/14/22 2117    04/15/22 0900  mupirocin 2 % ointment         04/20 0859 Nasl 2 times daily 04/15/22 0756          Antifungals (From admission, onward)                None          Antivirals (From admission, onward)      None             Immunization History   Administered Date(s) Administered    COVID-19, MRNA, LN-S, PF (MODERNA FULL 0.5 ML DOSE) 02/15/2021, 03/15/2021       Family History       Problem Relation (Age of Onset)    No Known Problems Mother, Father, Sister, Brother, Maternal Aunt, Maternal Uncle, Paternal Aunt, Paternal Uncle, Maternal Grandmother, Maternal Grandfather, Paternal Grandmother, Paternal Grandfather          Social History     Socioeconomic History    Marital status: Single   Tobacco Use    Smoking status: Never Smoker    Smokeless tobacco: Never Used     Review of Systems   Constitutional:  Positive for activity change. Negative for appetite change, chills and fever.   HENT:  Negative for congestion.    Eyes:  Positive for pain and visual disturbance (left eye). Negative for itching.   Respiratory:  Negative for cough, chest tightness and shortness of breath.    Cardiovascular:  Negative for palpitations and leg swelling.   Gastrointestinal:  Negative for abdominal pain  and nausea.   Endocrine: Negative for polyphagia and polyuria.   Genitourinary:  Negative for dysuria and frequency.   Musculoskeletal:  Negative for back pain.   Neurological:  Negative for numbness and headaches.   Psychiatric/Behavioral:  Negative for agitation and behavioral problems.    Objective:     Vital Signs (Most Recent):  Temp: 99.7 °F (37.6 °C) (04/15/22 1635)  Pulse: 60 (04/15/22 1635)  Resp: 19 (04/15/22 1635)  BP: (!) 163/72 (04/15/22 1635)  SpO2: (!) 94 % (04/15/22 1635)   Vital Signs (24h Range):  Temp:  [97.5 °F (36.4 °C)-100 °F (37.8 °C)] 99.7 °F (37.6 °C)  Pulse:  [59-69] 60  Resp:  [17-20] 19  SpO2:  [94 %-100 %] 94 %  BP: (139-203)/(62-94) 163/72     Weight: 81.6 kg (180 lb)  Body mass index is 29.95 kg/m².    Estimated Creatinine Clearance: 9.7 mL/min (A) (based on SCr of 4.7 mg/dL (H)).    Physical Exam  Constitutional:       Appearance: She is well-developed.   HENT:      Head: Normocephalic.   Eyes:      General:         Left eye: Discharge present.     Conjunctiva/sclera:      Left eye: Chemosis present.   Cardiovascular:      Rate and Rhythm: Normal rate and regular rhythm.      Heart sounds: Normal heart sounds. No murmur heard.  Pulmonary:      Effort: Pulmonary effort is normal.      Breath sounds: Normal breath sounds.   Abdominal:      General: Bowel sounds are normal. There is no distension.      Palpations: Abdomen is soft.      Tenderness: There is no abdominal tenderness.   Musculoskeletal:         General: Normal range of motion.   Neurological:      Mental Status: She is alert and oriented to person, place, and time.   Psychiatric:         Behavior: Behavior normal.       Significant Labs: All pertinent labs within the past 24 hours have been reviewed.    Significant Imaging: I have reviewed all pertinent imaging results/findings within the past 24 hours.               Negative

## 2024-11-25 ENCOUNTER — PATIENT MESSAGE (OUTPATIENT)
Dept: OPTOMETRY | Facility: CLINIC | Age: 84
End: 2024-11-25
Payer: MEDICARE

## 2024-11-25 ENCOUNTER — PATIENT MESSAGE (OUTPATIENT)
Dept: INFECTIOUS DISEASES | Facility: CLINIC | Age: 84
End: 2024-11-25
Payer: MEDICARE

## 2024-11-25 ENCOUNTER — PATIENT MESSAGE (OUTPATIENT)
Dept: OPHTHALMOLOGY | Facility: CLINIC | Age: 84
End: 2024-11-25
Payer: MEDICARE

## (undated) DEVICE — SHEET EENT SPLIT

## (undated) DEVICE — NDL STRAIGHT 4CM LEIBINGER

## (undated) DEVICE — SOL WATER STRL IRR 1000ML

## (undated) DEVICE — ELECTRODE REM PLYHSV RETURN 9

## (undated) DEVICE — SYR BULB EAR/ULCER STER 3OZ

## (undated) DEVICE — GOWN SURGICAL X-LARGE

## (undated) DEVICE — BOWL STERILE LARGE 32OZ

## (undated) DEVICE — TRAY MUSCLE LID EYE

## (undated) DEVICE — SUCTION SURGICAL STR 7FR

## (undated) DEVICE — DRAPE STERI INSTRUMENT 1018

## (undated) DEVICE — PENCIL ROCKER SWITCH 10FT CORD

## (undated) DEVICE — SUT CTD VICRYL 5-0 P-2 UNDB

## (undated) DEVICE — CUP MEDICINE GRADUATED 1OZ

## (undated) DEVICE — SWAB CULTURESWAB EZ DUAL

## (undated) DEVICE — BLADE SURG STAINLESS STEEL #11

## (undated) DEVICE — PROTECTOR CORNEAL CROUCH ST

## (undated) DEVICE — NDL HYPO A BEVEL 30X1/2

## (undated) DEVICE — SOL BETADINE 5%

## (undated) DEVICE — CONFORMER EYE W/H PMMA LG

## (undated) DEVICE — TOWEL OR DISP STRL BLUE 4/PK

## (undated) DEVICE — INSTRUMENT SURG SUCT FRZR W/C

## (undated) DEVICE — SOL 9P NACL IRR PIC IL

## (undated) DEVICE — SYR 10CC LUER LOCK

## (undated) DEVICE — NDL ECLIPSE SAFETY 18GX1-1/2IN

## (undated) DEVICE — TUBING SUC UNIV W/CONN 12FT

## (undated) DEVICE — SPEAR EYE WECK-CEL CELLULOSE

## (undated) DEVICE — CORD BIPOLAR 12 FOOT